# Patient Record
Sex: FEMALE | Race: WHITE | NOT HISPANIC OR LATINO | Employment: OTHER | URBAN - METROPOLITAN AREA
[De-identification: names, ages, dates, MRNs, and addresses within clinical notes are randomized per-mention and may not be internally consistent; named-entity substitution may affect disease eponyms.]

---

## 2017-04-17 ENCOUNTER — TRANSCRIBE ORDERS (OUTPATIENT)
Dept: LAB | Facility: CLINIC | Age: 72
End: 2017-04-17

## 2017-04-17 DIAGNOSIS — E04.1 NONTOXIC UNINODULAR GOITER: ICD-10-CM

## 2017-04-17 DIAGNOSIS — I11.9 MALIGNANT HYPERTENSIVE HEART DISEASE WITHOUT HEART FAILURE: Primary | ICD-10-CM

## 2017-04-17 DIAGNOSIS — R05.9 COUGH: ICD-10-CM

## 2017-04-17 DIAGNOSIS — E11.8 TYPE 2 DIABETES MELLITUS WITH COMPLICATION, UNSPECIFIED LONG TERM INSULIN USE STATUS: ICD-10-CM

## 2022-03-02 ENCOUNTER — HOSPITAL ENCOUNTER (INPATIENT)
Facility: HOSPITAL | Age: 77
LOS: 7 days | Discharge: HOME | DRG: 391 | End: 2022-03-09
Attending: EMERGENCY MEDICINE | Admitting: INTERNAL MEDICINE
Payer: MEDICARE

## 2022-03-02 ENCOUNTER — APPOINTMENT (EMERGENCY)
Dept: CT IMAGING | Facility: HOSPITAL | Age: 77
DRG: 391 | End: 2022-03-02
Payer: MEDICARE

## 2022-03-02 ENCOUNTER — APPOINTMENT (EMERGENCY)
Dept: RADIOLOGY | Facility: HOSPITAL | Age: 77
DRG: 391 | End: 2022-03-02
Payer: MEDICARE

## 2022-03-02 DIAGNOSIS — R11.2 NAUSEA AND VOMITING, INTRACTABILITY OF VOMITING NOT SPECIFIED, UNSPECIFIED VOMITING TYPE: ICD-10-CM

## 2022-03-02 DIAGNOSIS — N18.9 ACUTE KIDNEY INJURY SUPERIMPOSED ON CHRONIC KIDNEY DISEASE (HCC): ICD-10-CM

## 2022-03-02 DIAGNOSIS — N17.9 ACUTE KIDNEY INJURY SUPERIMPOSED ON CHRONIC KIDNEY DISEASE (HCC): ICD-10-CM

## 2022-03-02 DIAGNOSIS — R11.10 VOMITING: ICD-10-CM

## 2022-03-02 DIAGNOSIS — N18.9 CHRONIC RENAL IMPAIRMENT: ICD-10-CM

## 2022-03-02 DIAGNOSIS — R10.9 ABDOMINAL PAIN: ICD-10-CM

## 2022-03-02 DIAGNOSIS — R65.10 SIRS (SYSTEMIC INFLAMMATORY RESPONSE SYNDROME) (HCC): ICD-10-CM

## 2022-03-02 DIAGNOSIS — E11.10 DKA (DIABETIC KETOACIDOSIS) (HCC): ICD-10-CM

## 2022-03-02 DIAGNOSIS — I16.1 HYPERTENSIVE EMERGENCY: Primary | ICD-10-CM

## 2022-03-02 DIAGNOSIS — D72.829 LEUKOCYTOSIS: ICD-10-CM

## 2022-03-02 DIAGNOSIS — R73.9 HYPERGLYCEMIA: ICD-10-CM

## 2022-03-02 DIAGNOSIS — E87.2 LACTIC ACIDOSIS: ICD-10-CM

## 2022-03-02 PROBLEM — Z51.81 ADMISSION FOR LONG-TERM (CURRENT) USE OF ANTICOAGULANTS: Status: ACTIVE | Noted: 2022-03-02

## 2022-03-02 PROBLEM — I16.0 HYPERTENSIVE URGENCY: Status: ACTIVE | Noted: 2022-03-02

## 2022-03-02 PROBLEM — Z79.01 ADMISSION FOR LONG-TERM (CURRENT) USE OF ANTICOAGULANTS: Status: ACTIVE | Noted: 2022-03-02

## 2022-03-02 PROBLEM — R79.89 ELEVATED BRAIN NATRIURETIC PEPTIDE (BNP) LEVEL: Status: ACTIVE | Noted: 2022-03-02

## 2022-03-02 PROBLEM — I48.0 PAF (PAROXYSMAL ATRIAL FIBRILLATION) (HCC): Status: ACTIVE | Noted: 2022-03-02

## 2022-03-02 LAB
2HR DELTA HS TROPONIN: 4 NG/L
4HR DELTA HS TROPONIN: 3 NG/L
ALBUMIN SERPL BCP-MCNC: 4.1 G/DL (ref 3.5–5)
ALP SERPL-CCNC: 72 U/L (ref 46–116)
ALT SERPL W P-5'-P-CCNC: 35 U/L (ref 12–78)
AMMONIA PLAS-SCNC: <10 UMOL/L (ref 11–35)
ANION GAP SERPL CALCULATED.3IONS-SCNC: 13 MMOL/L (ref 4–13)
ANION GAP SERPL CALCULATED.3IONS-SCNC: 14 MMOL/L (ref 4–13)
ANION GAP SERPL CALCULATED.3IONS-SCNC: 8 MMOL/L (ref 4–13)
APTT PPP: 24 SECONDS (ref 23–37)
AST SERPL W P-5'-P-CCNC: 26 U/L (ref 5–45)
ATRIAL RATE: 105 BPM
BACTERIA UR QL AUTO: NORMAL /HPF
BASE EX.OXY STD BLDV CALC-SCNC: 64.5 % (ref 60–80)
BASE EXCESS BLDV CALC-SCNC: 1.2 MMOL/L
BASOPHILS # BLD AUTO: 0.06 THOUSANDS/ΜL (ref 0–0.1)
BASOPHILS NFR BLD AUTO: 0 % (ref 0–1)
BETA-HYDROXYBUTYRATE: 0.8 MMOL/L
BILIRUB SERPL-MCNC: 2.01 MG/DL (ref 0.2–1)
BILIRUB UR QL STRIP: NEGATIVE
BUN SERPL-MCNC: 18 MG/DL (ref 5–25)
BUN SERPL-MCNC: 20 MG/DL (ref 5–25)
BUN SERPL-MCNC: 21 MG/DL (ref 5–25)
CALCIUM SERPL-MCNC: 10 MG/DL (ref 8.3–10.1)
CALCIUM SERPL-MCNC: 9.4 MG/DL (ref 8.3–10.1)
CALCIUM SERPL-MCNC: 9.5 MG/DL (ref 8.3–10.1)
CARDIAC TROPONIN I PNL SERPL HS: 13 NG/L
CARDIAC TROPONIN I PNL SERPL HS: 16 NG/L
CARDIAC TROPONIN I PNL SERPL HS: 17 NG/L
CHLORIDE SERPL-SCNC: 100 MMOL/L (ref 100–108)
CHLORIDE SERPL-SCNC: 103 MMOL/L (ref 100–108)
CHLORIDE SERPL-SCNC: 96 MMOL/L (ref 100–108)
CLARITY UR: CLEAR
CO2 SERPL-SCNC: 25 MMOL/L (ref 21–32)
CO2 SERPL-SCNC: 28 MMOL/L (ref 21–32)
CO2 SERPL-SCNC: 30 MMOL/L (ref 21–32)
COLOR UR: YELLOW
CREAT SERPL-MCNC: 1.26 MG/DL (ref 0.6–1.3)
CREAT SERPL-MCNC: 1.46 MG/DL (ref 0.6–1.3)
CREAT SERPL-MCNC: 1.5 MG/DL (ref 0.6–1.3)
EOSINOPHIL # BLD AUTO: 0 THOUSAND/ΜL (ref 0–0.61)
EOSINOPHIL NFR BLD AUTO: 0 % (ref 0–6)
ERYTHROCYTE [DISTWIDTH] IN BLOOD BY AUTOMATED COUNT: 13.8 % (ref 11.6–15.1)
FLUAV RNA RESP QL NAA+PROBE: NEGATIVE
FLUBV RNA RESP QL NAA+PROBE: NEGATIVE
GFR SERPL CREATININE-BSD FRML MDRD: 34 ML/MIN/1.73SQ M
GFR SERPL CREATININE-BSD FRML MDRD: 35 ML/MIN/1.73SQ M
GFR SERPL CREATININE-BSD FRML MDRD: 42 ML/MIN/1.73SQ M
GLUCOSE SERPL-MCNC: 175 MG/DL (ref 65–140)
GLUCOSE SERPL-MCNC: 203 MG/DL (ref 65–140)
GLUCOSE SERPL-MCNC: 203 MG/DL (ref 65–140)
GLUCOSE SERPL-MCNC: 220 MG/DL (ref 65–140)
GLUCOSE SERPL-MCNC: 229 MG/DL (ref 65–140)
GLUCOSE SERPL-MCNC: 252 MG/DL (ref 65–140)
GLUCOSE SERPL-MCNC: 364 MG/DL (ref 65–140)
GLUCOSE SERPL-MCNC: 396 MG/DL (ref 65–140)
GLUCOSE SERPL-MCNC: 401 MG/DL (ref 65–140)
GLUCOSE SERPL-MCNC: 417 MG/DL (ref 65–140)
GLUCOSE SERPL-MCNC: 440 MG/DL (ref 65–140)
GLUCOSE UR STRIP-MCNC: ABNORMAL MG/DL
HCO3 BLDV-SCNC: 27.9 MMOL/L (ref 24–30)
HCT VFR BLD AUTO: 43.2 % (ref 34.8–46.1)
HGB BLD-MCNC: 15.1 G/DL (ref 11.5–15.4)
HGB UR QL STRIP.AUTO: ABNORMAL
IMM GRANULOCYTES # BLD AUTO: 0.13 THOUSAND/UL (ref 0–0.2)
IMM GRANULOCYTES NFR BLD AUTO: 1 % (ref 0–2)
INR PPP: 1.02 (ref 0.84–1.19)
KETONES UR STRIP-MCNC: ABNORMAL MG/DL
LACTATE SERPL-SCNC: 2 MMOL/L (ref 0.5–2)
LACTATE SERPL-SCNC: 2.1 MMOL/L (ref 0.5–2)
LACTATE SERPL-SCNC: 2.3 MMOL/L (ref 0.5–2)
LACTATE SERPL-SCNC: 3.1 MMOL/L (ref 0.5–2)
LEUKOCYTE ESTERASE UR QL STRIP: ABNORMAL
LIPASE SERPL-CCNC: 185 U/L (ref 73–393)
LYMPHOCYTES # BLD AUTO: 1.44 THOUSANDS/ΜL (ref 0.6–4.47)
LYMPHOCYTES NFR BLD AUTO: 10 % (ref 14–44)
MCH RBC QN AUTO: 27.7 PG (ref 26.8–34.3)
MCHC RBC AUTO-ENTMCNC: 35 G/DL (ref 31.4–37.4)
MCV RBC AUTO: 79 FL (ref 82–98)
MONOCYTES # BLD AUTO: 0.3 THOUSAND/ΜL (ref 0.17–1.22)
MONOCYTES NFR BLD AUTO: 2 % (ref 4–12)
NEUTROPHILS # BLD AUTO: 12.91 THOUSANDS/ΜL (ref 1.85–7.62)
NEUTS SEG NFR BLD AUTO: 87 % (ref 43–75)
NITRITE UR QL STRIP: NEGATIVE
NON-SQ EPI CELLS URNS QL MICRO: NORMAL /HPF
NRBC BLD AUTO-RTO: 0 /100 WBCS
NT-PROBNP SERPL-MCNC: 4572 PG/ML
O2 CT BLDV-SCNC: 13.3 ML/DL
P AXIS: 69 DEGREES
PCO2 BLDV: 52 MM HG (ref 42–50)
PH BLDV: 7.35 [PH] (ref 7.3–7.4)
PH UR STRIP.AUTO: 7.5 [PH]
PLATELET # BLD AUTO: 293 THOUSANDS/UL (ref 149–390)
PMV BLD AUTO: 10.8 FL (ref 8.9–12.7)
PO2 BLDV: 36.4 MM HG (ref 35–45)
POTASSIUM SERPL-SCNC: 3.4 MMOL/L (ref 3.5–5.3)
POTASSIUM SERPL-SCNC: 3.7 MMOL/L (ref 3.5–5.3)
POTASSIUM SERPL-SCNC: 4 MMOL/L (ref 3.5–5.3)
PR INTERVAL: 172 MS
PROT SERPL-MCNC: 8.4 G/DL (ref 6.4–8.2)
PROT UR STRIP-MCNC: >=300 MG/DL
PROTHROMBIN TIME: 13.4 SECONDS (ref 11.6–14.5)
QRS AXIS: -39 DEGREES
QRSD INTERVAL: 90 MS
QT INTERVAL: 336 MS
QTC INTERVAL: 444 MS
RBC # BLD AUTO: 5.46 MILLION/UL (ref 3.81–5.12)
RBC #/AREA URNS AUTO: NORMAL /HPF
RSV RNA RESP QL NAA+PROBE: NEGATIVE
SARS-COV-2 RNA RESP QL NAA+PROBE: NEGATIVE
SODIUM SERPL-SCNC: 135 MMOL/L (ref 136–145)
SODIUM SERPL-SCNC: 141 MMOL/L (ref 136–145)
SODIUM SERPL-SCNC: 141 MMOL/L (ref 136–145)
SP GR UR STRIP.AUTO: 1.02 (ref 1–1.03)
T WAVE AXIS: 36 DEGREES
TSH SERPL DL<=0.05 MIU/L-ACNC: 1.5 UIU/ML (ref 0.36–3.74)
UROBILINOGEN UR QL STRIP.AUTO: 0.2 E.U./DL
VENTRICULAR RATE: 105 BPM
WBC # BLD AUTO: 14.84 THOUSAND/UL (ref 4.31–10.16)
WBC #/AREA URNS AUTO: NORMAL /HPF

## 2022-03-02 PROCEDURE — 83880 ASSAY OF NATRIURETIC PEPTIDE: CPT | Performed by: PHYSICIAN ASSISTANT

## 2022-03-02 PROCEDURE — 85025 COMPLETE CBC W/AUTO DIFF WBC: CPT | Performed by: PHYSICIAN ASSISTANT

## 2022-03-02 PROCEDURE — 84484 ASSAY OF TROPONIN QUANT: CPT | Performed by: PHYSICIAN ASSISTANT

## 2022-03-02 PROCEDURE — 84443 ASSAY THYROID STIM HORMONE: CPT | Performed by: PHYSICIAN ASSISTANT

## 2022-03-02 PROCEDURE — 93005 ELECTROCARDIOGRAM TRACING: CPT

## 2022-03-02 PROCEDURE — 0241U HB NFCT DS VIR RESP RNA 4 TRGT: CPT | Performed by: PHYSICIAN ASSISTANT

## 2022-03-02 PROCEDURE — 99285 EMERGENCY DEPT VISIT HI MDM: CPT

## 2022-03-02 PROCEDURE — 96376 TX/PRO/DX INJ SAME DRUG ADON: CPT

## 2022-03-02 PROCEDURE — 80048 BASIC METABOLIC PNL TOTAL CA: CPT | Performed by: INTERNAL MEDICINE

## 2022-03-02 PROCEDURE — 82010 KETONE BODYS QUAN: CPT | Performed by: PHYSICIAN ASSISTANT

## 2022-03-02 PROCEDURE — 82948 REAGENT STRIP/BLOOD GLUCOSE: CPT

## 2022-03-02 PROCEDURE — 82805 BLOOD GASES W/O2 SATURATION: CPT | Performed by: PHYSICIAN ASSISTANT

## 2022-03-02 PROCEDURE — 71275 CT ANGIOGRAPHY CHEST: CPT

## 2022-03-02 PROCEDURE — 85730 THROMBOPLASTIN TIME PARTIAL: CPT | Performed by: PHYSICIAN ASSISTANT

## 2022-03-02 PROCEDURE — 74174 CTA ABD&PLVS W/CONTRAST: CPT

## 2022-03-02 PROCEDURE — 96374 THER/PROPH/DIAG INJ IV PUSH: CPT

## 2022-03-02 PROCEDURE — 70450 CT HEAD/BRAIN W/O DYE: CPT

## 2022-03-02 PROCEDURE — G1004 CDSM NDSC: HCPCS

## 2022-03-02 PROCEDURE — 87040 BLOOD CULTURE FOR BACTERIA: CPT | Performed by: PHYSICIAN ASSISTANT

## 2022-03-02 PROCEDURE — C9113 INJ PANTOPRAZOLE SODIUM, VIA: HCPCS | Performed by: PHYSICIAN ASSISTANT

## 2022-03-02 PROCEDURE — 83605 ASSAY OF LACTIC ACID: CPT | Performed by: PHYSICIAN ASSISTANT

## 2022-03-02 PROCEDURE — 71045 X-RAY EXAM CHEST 1 VIEW: CPT

## 2022-03-02 PROCEDURE — 83690 ASSAY OF LIPASE: CPT | Performed by: PHYSICIAN ASSISTANT

## 2022-03-02 PROCEDURE — 81001 URINALYSIS AUTO W/SCOPE: CPT | Performed by: PHYSICIAN ASSISTANT

## 2022-03-02 PROCEDURE — 99223 1ST HOSP IP/OBS HIGH 75: CPT | Performed by: INTERNAL MEDICINE

## 2022-03-02 PROCEDURE — 83036 HEMOGLOBIN GLYCOSYLATED A1C: CPT | Performed by: PHYSICIAN ASSISTANT

## 2022-03-02 PROCEDURE — 36415 COLL VENOUS BLD VENIPUNCTURE: CPT | Performed by: PHYSICIAN ASSISTANT

## 2022-03-02 PROCEDURE — 99285 EMERGENCY DEPT VISIT HI MDM: CPT | Performed by: PHYSICIAN ASSISTANT

## 2022-03-02 PROCEDURE — 80053 COMPREHEN METABOLIC PANEL: CPT | Performed by: PHYSICIAN ASSISTANT

## 2022-03-02 PROCEDURE — 87086 URINE CULTURE/COLONY COUNT: CPT | Performed by: PHYSICIAN ASSISTANT

## 2022-03-02 PROCEDURE — 96361 HYDRATE IV INFUSION ADD-ON: CPT

## 2022-03-02 PROCEDURE — 99232 SBSQ HOSP IP/OBS MODERATE 35: CPT | Performed by: INTERNAL MEDICINE

## 2022-03-02 PROCEDURE — 85610 PROTHROMBIN TIME: CPT | Performed by: PHYSICIAN ASSISTANT

## 2022-03-02 PROCEDURE — 96375 TX/PRO/DX INJ NEW DRUG ADDON: CPT

## 2022-03-02 PROCEDURE — 93010 ELECTROCARDIOGRAM REPORT: CPT | Performed by: INTERNAL MEDICINE

## 2022-03-02 PROCEDURE — 82140 ASSAY OF AMMONIA: CPT | Performed by: PHYSICIAN ASSISTANT

## 2022-03-02 RX ORDER — CARVEDILOL 12.5 MG/1
25 TABLET ORAL 2 TIMES DAILY WITH MEALS
Status: DISCONTINUED | OUTPATIENT
Start: 2022-03-02 | End: 2022-03-03

## 2022-03-02 RX ORDER — SIMVASTATIN 10 MG
10 TABLET ORAL
COMMUNITY

## 2022-03-02 RX ORDER — SODIUM CHLORIDE 9 MG/ML
75 INJECTION, SOLUTION INTRAVENOUS CONTINUOUS
Status: DISCONTINUED | OUTPATIENT
Start: 2022-03-02 | End: 2022-03-03

## 2022-03-02 RX ORDER — PANTOPRAZOLE SODIUM 40 MG/1
40 INJECTION, POWDER, FOR SOLUTION INTRAVENOUS EVERY 12 HOURS SCHEDULED
Status: DISCONTINUED | OUTPATIENT
Start: 2022-03-02 | End: 2022-03-06

## 2022-03-02 RX ORDER — FAMOTIDINE 20 MG/1
20 TABLET, FILM COATED ORAL 2 TIMES DAILY
Status: DISCONTINUED | OUTPATIENT
Start: 2022-03-02 | End: 2022-03-03

## 2022-03-02 RX ORDER — LABETALOL 20 MG/4 ML (5 MG/ML) INTRAVENOUS SYRINGE
10 ONCE
Status: COMPLETED | OUTPATIENT
Start: 2022-03-02 | End: 2022-03-02

## 2022-03-02 RX ORDER — LABETALOL 20 MG/4 ML (5 MG/ML) INTRAVENOUS SYRINGE
10 EVERY 6 HOURS PRN
Status: DISCONTINUED | OUTPATIENT
Start: 2022-03-02 | End: 2022-03-03

## 2022-03-02 RX ORDER — PANTOPRAZOLE SODIUM 40 MG/1
40 INJECTION, POWDER, FOR SOLUTION INTRAVENOUS EVERY 12 HOURS SCHEDULED
Status: DISCONTINUED | OUTPATIENT
Start: 2022-03-02 | End: 2022-03-02

## 2022-03-02 RX ORDER — CARVEDILOL 25 MG/1
25 TABLET ORAL 2 TIMES DAILY WITH MEALS
COMMUNITY

## 2022-03-02 RX ORDER — LABETALOL 20 MG/4 ML (5 MG/ML) INTRAVENOUS SYRINGE
20 ONCE
Status: COMPLETED | OUTPATIENT
Start: 2022-03-02 | End: 2022-03-02

## 2022-03-02 RX ORDER — ONDANSETRON 2 MG/ML
4 INJECTION INTRAMUSCULAR; INTRAVENOUS ONCE
Status: COMPLETED | OUTPATIENT
Start: 2022-03-02 | End: 2022-03-02

## 2022-03-02 RX ORDER — METOCLOPRAMIDE HYDROCHLORIDE 5 MG/ML
10 INJECTION INTRAMUSCULAR; INTRAVENOUS ONCE
Status: COMPLETED | OUTPATIENT
Start: 2022-03-02 | End: 2022-03-02

## 2022-03-02 RX ORDER — METOCLOPRAMIDE HYDROCHLORIDE 5 MG/ML
10 INJECTION INTRAMUSCULAR; INTRAVENOUS EVERY 6 HOURS PRN
Status: DISCONTINUED | OUTPATIENT
Start: 2022-03-02 | End: 2022-03-06

## 2022-03-02 RX ORDER — ONDANSETRON 2 MG/ML
4 INJECTION INTRAMUSCULAR; INTRAVENOUS EVERY 4 HOURS PRN
Status: DISCONTINUED | OUTPATIENT
Start: 2022-03-02 | End: 2022-03-06

## 2022-03-02 RX ORDER — LABETALOL 20 MG/4 ML (5 MG/ML) INTRAVENOUS SYRINGE
20 ONCE
Status: DISCONTINUED | OUTPATIENT
Start: 2022-03-02 | End: 2022-03-03

## 2022-03-02 RX ORDER — GLIMEPIRIDE 4 MG/1
4 TABLET ORAL
COMMUNITY
End: 2022-03-09 | Stop reason: HOSPADM

## 2022-03-02 RX ORDER — MECLIZINE HYDROCHLORIDE 25 MG/1
25 TABLET ORAL EVERY 12 HOURS PRN
COMMUNITY
End: 2022-03-09 | Stop reason: HOSPADM

## 2022-03-02 RX ORDER — HYDROMORPHONE HCL/PF 1 MG/ML
0.2 SYRINGE (ML) INJECTION EVERY 4 HOURS PRN
Status: DISCONTINUED | OUTPATIENT
Start: 2022-03-02 | End: 2022-03-03

## 2022-03-02 RX ORDER — MAGNESIUM HYDROXIDE/ALUMINUM HYDROXICE/SIMETHICONE 120; 1200; 1200 MG/30ML; MG/30ML; MG/30ML
30 SUSPENSION ORAL EVERY 4 HOURS PRN
Status: DISCONTINUED | OUTPATIENT
Start: 2022-03-02 | End: 2022-03-06

## 2022-03-02 RX ORDER — MORPHINE SULFATE 4 MG/ML
4 INJECTION, SOLUTION INTRAMUSCULAR; INTRAVENOUS ONCE
Status: COMPLETED | OUTPATIENT
Start: 2022-03-02 | End: 2022-03-02

## 2022-03-02 RX ORDER — PRAVASTATIN SODIUM 20 MG
20 TABLET ORAL
Status: DISCONTINUED | OUTPATIENT
Start: 2022-03-02 | End: 2022-03-09 | Stop reason: HOSPADM

## 2022-03-02 RX ORDER — CETIRIZINE HYDROCHLORIDE 10 MG/1
10 TABLET ORAL DAILY
COMMUNITY
End: 2022-03-09 | Stop reason: HOSPADM

## 2022-03-02 RX ORDER — PANTOPRAZOLE SODIUM 40 MG/1
40 INJECTION, POWDER, FOR SOLUTION INTRAVENOUS ONCE
Status: COMPLETED | OUTPATIENT
Start: 2022-03-02 | End: 2022-03-02

## 2022-03-02 RX ADMIN — IOHEXOL 100 ML: 350 INJECTION, SOLUTION INTRAVENOUS at 06:20

## 2022-03-02 RX ADMIN — LABETALOL HYDROCHLORIDE 10 MG: 5 INJECTION, SOLUTION INTRAVENOUS at 05:53

## 2022-03-02 RX ADMIN — SODIUM CHLORIDE 9 UNITS/HR: 9 INJECTION, SOLUTION INTRAVENOUS at 12:44

## 2022-03-02 RX ADMIN — SODIUM CHLORIDE 1000 ML: 0.9 INJECTION, SOLUTION INTRAVENOUS at 10:01

## 2022-03-02 RX ADMIN — LABETALOL HYDROCHLORIDE 20 MG: 5 INJECTION, SOLUTION INTRAVENOUS at 10:24

## 2022-03-02 RX ADMIN — LABETALOL HYDROCHLORIDE 10 MG: 5 INJECTION, SOLUTION INTRAVENOUS at 09:15

## 2022-03-02 RX ADMIN — LABETALOL HYDROCHLORIDE 10 MG: 5 INJECTION, SOLUTION INTRAVENOUS at 15:44

## 2022-03-02 RX ADMIN — PRAVASTATIN SODIUM 20 MG: 20 TABLET ORAL at 16:24

## 2022-03-02 RX ADMIN — MORPHINE SULFATE 4 MG: 4 INJECTION INTRAVENOUS at 05:53

## 2022-03-02 RX ADMIN — CARVEDILOL 25 MG: 12.5 TABLET, FILM COATED ORAL at 17:03

## 2022-03-02 RX ADMIN — LABETALOL HYDROCHLORIDE 10 MG: 5 INJECTION, SOLUTION INTRAVENOUS at 07:15

## 2022-03-02 RX ADMIN — MORPHINE SULFATE 2 MG: 2 INJECTION, SOLUTION INTRAMUSCULAR; INTRAVENOUS at 09:20

## 2022-03-02 RX ADMIN — FAMOTIDINE 20 MG: 20 TABLET, FILM COATED ORAL at 14:16

## 2022-03-02 RX ADMIN — METOCLOPRAMIDE HYDROCHLORIDE 10 MG: 5 INJECTION INTRAMUSCULAR; INTRAVENOUS at 07:46

## 2022-03-02 RX ADMIN — PANTOPRAZOLE SODIUM 40 MG: 40 INJECTION, POWDER, FOR SOLUTION INTRAVENOUS at 20:12

## 2022-03-02 RX ADMIN — PANTOPRAZOLE SODIUM 40 MG: 40 INJECTION, POWDER, FOR SOLUTION INTRAVENOUS at 10:01

## 2022-03-02 RX ADMIN — ONDANSETRON 4 MG: 2 INJECTION INTRAMUSCULAR; INTRAVENOUS at 05:53

## 2022-03-02 RX ADMIN — SODIUM CHLORIDE 75 ML/HR: 0.9 INJECTION, SOLUTION INTRAVENOUS at 15:26

## 2022-03-02 RX ADMIN — HYDROMORPHONE HYDROCHLORIDE 0.2 MG: 1 INJECTION, SOLUTION INTRAMUSCULAR; INTRAVENOUS; SUBCUTANEOUS at 16:22

## 2022-03-02 NOTE — ASSESSMENT & PLAN NOTE
· No previous levels on file to compare, check echocardiogram non-urgently  · No current evidence of heart failure

## 2022-03-02 NOTE — ASSESSMENT & PLAN NOTE
· On xarelto but no clear knowledge of why; no documented history of afib or VTE  · Continue xarelto for now as long as she is not bleeding, and will attempt to get additional info

## 2022-03-02 NOTE — ED CARE HANDOFF
Emergency Department Sign Out Note        Sign out and transfer of care from [facility]  See Separate Emergency Department note  The patient, Sam Varela, was evaluated by the previous provider for epigastric pain, nausea, vomiting and HTN  Patient recently moved to the [address] [place] [address],[address] from [place] 2-3 months ago  Patient's PCP provided me with information in regards to her medical history which includes HTN, DM, Bipolar Disorder/Schizophrenia  PCP reports possible history of paroxysmal atrial fibrillation for which she takes Xerelto  PCP reports issues with compliance with medications  Workup Completed:  EKG, Trop x 2, CBC, CMP, Lipase, UA, Blood Gas, Beta Hydroxybutyrate, Ammonia, Lactic Acid, CXR, CTA Dissection Study    ED Course / Workup Pending (followup):  Repeat EKK and Troponin  Repeat BP Medications  Re-evaluation                                  ED Course as of 03/02/22 1222   Wed Mar 02, 2022   1134 Case discussed with CCU team who reccommends IV Labetalol 20 mg every 10-15 minutes to achieve systolic 930     4265 Case discussed with SLIM who is agreeable to admission to Step Down 2        ECG 12 Lead Documentation Only    Date/Time: 3/2/2022 9:43 AM  Performed by: Candie Palacio PA-C  Authorized by: Pedro Hernandez MD     Indications / Diagnosis:  Epigastric Pain, Repeat EKG  Patient location:  ED  Rate:     ECG rate:  95    ECG rate assessment: tachycardic    Rhythm:     Rhythm: sinus tachycardia    QRS:     QRS axis:  Left  ST segments:     ST segments:  Non-specific    Elevation:  V2 and III  T waves:     T waves: inverted      Inverted:  V5 and V6  Comments:      QT/QTc 360/452      MDM        Disposition  Final diagnoses:   Hypertensive emergency   Abdominal pain   Vomiting   Lactic acidosis   Leukocytosis   Hyperglycemia     Time reflects when diagnosis was documented in both MDM as applicable and the Disposition within this note     Time User Action Codes Description Comment 3/2/2022  8:17 AM Katalnia Cordon Add [I16 1] Hypertensive emergency     3/2/2022  8:17 AM Katalina Cordon Add [R10 9] Abdominal pain     3/2/2022  8:17 AM Katalina Cordon Add [R11 10] Vomiting     3/2/2022  8:17 AM Katalina Cordon Add [E87 2] Lactic acidosis     3/2/2022  8:17 AM Katalina Cordon Add [D72 829] Leukocytosis     3/2/2022  8:31 AM Katalina Cordon Add [R73 9] Hyperglycemia       ED Disposition     ED Disposition Condition Date/Time Comment    Admit Stable Wed Mar 2, 2022 12:12 PM Case was discussed with Dr Rupa Toledo and the patient's admission status was agreed to be Admission Status: inpatient status to the service of Dr Rupa Toledo  Follow-up Information    None       Patient's Medications   Discharge Prescriptions    No medications on file     No discharge procedures on file         ED Provider  Electronically Signed by     Rosendo Villanueva PA-C  03/02/22 2958

## 2022-03-02 NOTE — INCIDENTAL FINDINGS
The following findings require follow up:  Radiographic finding   Finding: lung nodule   Follow up required: repeat CT chest   Follow up should be done within 3-6 months    Please notify the following clinician to assist with the follow up:  Family doctor

## 2022-03-02 NOTE — ASSESSMENT & PLAN NOTE
· Profoundly elevated blood pressure on admission at 270/133, with associated nonspecific EKG changes  · Responded well to doses of labetalol provided in the ER with some improvement in blood pressure; avoid overcorrection  · Continue Coreg  · Would add ACE/ARB in 24 hours if creatinine stable given underlying DM2

## 2022-03-02 NOTE — H&P
[facility]  H&P- Yany Gaviria 1945, 78 y o  female MRN: [id number]  Unit/Bed#: ED 12 Encounter: [id number]  Primary Care Provider: Munir Jarrell MD   Date and time admitted to hospital: 3/2/2022  5:21 AM    * Abdominal pain with nausea and vomiting  Assessment & Plan  · Patient brought in for abdominal pain with nausea and vomiting, [place] speaking only and provides minimal history but noted to be frequently belching and moaning  · CT scan abdomen and pelvis on admission was unremarkable aside from mild fatty liver  · Recommend supportive care with IV fluids, antiemetics and antacids  · Clear liquids for now and advance as tolerated  · Treat DKA as below    DKA (diabetic ketoacidosis) ([facility] 75 )  Assessment & Plan  No results found for: HGBA1C    Recent Labs     03/02/22  0705 03/02/22  1131 03/02/22  1223   POCGLU 396* 440* 401*       Blood Sugar Average: Last 72 hrs:  (P) 291 [id number]   · Patient presented with nausea, vomiting, epigastric pain and was found to have severe hyperglycemia   Mildly elevated anion gap and mildly elevated beta hydroxybutyrate  · Stop oral hypoglycemic agents including Amaryl, metformin and Januvia  · Continue IV fluids and DKA protocol insulin drip  · Await A1c  · Consult endocrinology    Hypertensive urgency  Assessment & Plan  · Profoundly elevated blood pressure on admission at 270/133, with associated nonspecific EKG changes  · Responded well to doses of labetalol provided in the ER with some improvement in blood pressure; avoid overcorrection  · Continue Coreg  · Would add ACE/ARB in 24 hours if creatinine stable given underlying DM2    Lactic acidosis  Assessment & Plan  · Stop metformin  · Continue IVF  · Trend lactic acid    use of anticoagulation  Assessment & Plan  · On xarelto but no clear knowledge of why; no documented history of afib or VTE  · Continue xarelto for now as long as she is not bleeding, and will attempt to get additional info    Elevated brain natriuretic peptide (BNP) level  Assessment & Plan  · No previous levels on file to compare, check echocardiogram non-urgently  · No current evidence of heart failure      SIRS (systemic inflammatory response syndrome) (HCC)  Assessment & Plan  · Patient with leukocytosis, tachycardia and lactic acidosis present on admission -possibly viral gastroenteritis  Monitor clinically and trend vitals    VTE Prophylaxis: Rivaroxaban (Xarelto)   Code Status:  Unable to successfully discussed--will maintain full code status  POLST: POLST is not applicable to this patient  Discussion with family:  I attempted to reach patient's son, unsuccessfully  Left a voicemail  Also noted that the ER provider has been attempting to reach out to him several times today    Anticipated Length of Stay:  Patient will be admitted on an Inpatient basis with an anticipated length of stay of  greater than 2 midnights  Justification for Hospital Stay:  DKA and severe hypertension    Total Time for Visit, including Counseling / Coordination of Care: 60 minutes  Greater than 50% of this total time spent on direct patient counseling and coordination of care  Chief Complaint:   Nausea, vomiting and abdominal pain    History of Present Illness:    Jennifer Arambula is a 78 y o  female who presents with epigastric pain with nausea and vomiting  Unfortunately patient only speaks [place] and no family members are available to provide history and patient herself at this time is a limited historian also due to being ill and fatigued  She just recently moved from [place] within the last couple months and was seen 1 time by a new PCP, with whom the ER team spoke  There were apparently concerns with patient being compliant with her medications    Patient does answer no when asked if she is having any chest pain or shortness of breath (via Google translate) and is still reporting abdominal pain at this time    I spoke with patient's nurse in the ER who reports that she was catheterized for 500 mL simply to obtain a urine sample not due to complaints of retention  Since arriving in the hospital and receiving several doses of labetalol her blood pressure has now improved and she was just initiated on insulin drip a short time ago with current blood sugar still over 400  Review of Systems:  Significantly limited ability to obtain history in the setting of patient only speaking [place] and no family at bedside  Also currently ill     Review of Systems   Unable to perform ROS: Other   Respiratory: Negative for shortness of breath  Cardiovascular: Negative for chest pain  Gastrointestinal: Positive for abdominal pain  Past Medical and Surgical History:   No history on file, patient recently came from [place] approximately 3 months ago and has only been seen by PCP 1  Meds/Allergies:    Prior to Admission medications    Medication Sig Start Date End Date Taking? Authorizing Provider   carvedilol (COREG) 25 mg tablet Take 25 mg by mouth 2 (two) times a day with meals   Yes Historical Provider, MD   cetirizine (ZyrTEC) 10 mg tablet Take 10 mg by mouth daily   Yes Historical Provider, MD   glimepiride (AMARYL) 4 mg tablet Take 4 mg by mouth every morning before breakfast   Yes Historical Provider, MD   meclizine (ANTIVERT) 25 mg tablet Take 25 mg by mouth every 12 (twelve) hours as needed for dizziness   Yes Historical Provider, MD   metFORMIN (GLUCOPHAGE) 1000 MG tablet Take 1,000 mg by mouth 2 (two) times a day with meals   Yes Historical Provider, MD   rivaroxaban (XARELTO) 20 mg tablet Take 20 mg by mouth   Yes Historical Provider, MD   simvastatin (ZOCOR) 10 mg tablet Take 10 mg by mouth daily at bedtime   Yes Historical Provider, MD   sitaGLIPtin (JANUVIA) 100 mg tablet Take 100 mg by mouth daily   Yes Historical Provider, MD     Unable to confirm    Allergies:    Allergies   Allergen Reactions    Penicillins Other (See Comments)     Unknown         Social History:     Marital Status: Legally    Occupation:   Patient Pre-hospital Living Situation:   Patient Pre-hospital Level of Mobility:   Patient Pre-hospital Diet Restrictions:   Substance Use History:   Social History     Substance and Sexual Activity   Alcohol Use Not Currently     Social History     Tobacco Use   Smoking Status Never Smoker   Smokeless Tobacco Never Used     Social History     Substance and Sexual Activity   Drug Use Not Currently       Family History:    unknown    Physical Exam:     Vitals:   Blood Pressure: (!) 180/80 (03/02/22 1156)  Pulse: 93 (03/02/22 1124)  Temperature: 98 5 °F (36 9 °C) (03/02/22 1124)  Temp Source: Oral (03/02/22 1124)  Respirations: 18 (03/02/22 1124)  Weight - Scale: 81 6 kg (180 lb) (03/02/22 0534)  SpO2: 99 % (03/02/22 1124)    Physical Exam  Vitals reviewed  Constitutional:       General: She is not in acute distress  Appearance: She is obese  She is not ill-appearing, toxic-appearing or diaphoretic  Eyes:      General: No scleral icterus  Right eye: No discharge  Left eye: No discharge  Cardiovascular:      Rate and Rhythm: Normal rate and regular rhythm  Comments: Possible soft systolic ejection murmur, difficult to assess when patient noted to be moaning  Pulmonary:      Effort: No respiratory distress  Breath sounds: No stridor  No wheezing or rhonchi  Abdominal:      General: There is no distension  Palpations: Abdomen is soft  Tenderness: There is no abdominal tenderness  There is no guarding  Comments: Soft obese abdomen with no focal tenderness to palpation  Noted to be belching and lightly moaning   Musculoskeletal:         General: No swelling, tenderness, deformity or signs of injury  Right lower leg: No edema  Left lower leg: No edema  Skin:     Coloration: Skin is not jaundiced or pale  Findings: No bruising, erythema, lesion or rash  Neurological:      Mental Status: She is alert  Additional Data:     Lab Results: I have personally reviewed pertinent reports  Results from last 7 days   Lab Units 03/02/22  0547   WBC Thousand/uL 14 84*   HEMOGLOBIN g/dL 15 1   HEMATOCRIT % 43 2   PLATELETS Thousands/uL 293   NEUTROS PCT % 87*   LYMPHS PCT % 10*   MONOS PCT % 2*   EOS PCT % 0     Results from last 7 days   Lab Units 03/02/22  0612   SODIUM mmol/L 135*   POTASSIUM mmol/L 3 7   CHLORIDE mmol/L 96*   CO2 mmol/L 25   BUN mg/dL 18   CREATININE mg/dL 1 26   ANION GAP mmol/L 14*   CALCIUM mg/dL 10 0   ALBUMIN g/dL 4 1   TOTAL BILIRUBIN mg/dL 2 01*   ALK PHOS U/L 72   ALT U/L 35   AST U/L 26   GLUCOSE RANDOM mg/dL 417*     Results from last 7 days   Lab Units 03/02/22  0951   INR  1 02     Results from last 7 days   Lab Units 03/02/22  1223 03/02/22  1131 03/02/22  0705   POC GLUCOSE mg/dl 401* 440* 396*         Results from last 7 days   Lab Units 03/02/22  0951 03/02/22  0547   LACTIC ACID mmol/L 2 1* 2 3*       Imaging: I have personally reviewed pertinent reports  CT head without contrast   Final Result by Stephanie Hale MD (03/02 2347)      No acute intracranial abnormality  Workstation performed: [id number]         CTA dissection protocol chest/abdomen/pelvis   Final Result by Jade Jim MD (03/02 1757)      No acute pathology  No aortic aneurysm or dissection  Incidental 2-3 mm right upper lobe nodules  Based on current Fleischner Society 2017 Guidelines on incidental pulmonary nodule, no routine follow-up is needed if the patient is low risk  If the patient is high risk, optional follow-up chest CT at 12    months can be considered  Fatty liver  Colonic diverticulosis without diverticulitis  Workstation performed: [id number]         XR chest 1 view portable   Final Result by Ronald Maldonado MD (03/02 4024)      No acute cardiopulmonary disease                    Workstation performed: [id number]             EKG, Pathology, and Other Studies Reviewed on Admission:   · EKG: NSR    Allscripts / Epic Records Reviewed:no records  Spoke with ER provider who spoke with PCP    ** Please Note: This note has been constructed using a voice recognition system   **

## 2022-03-02 NOTE — ED NOTES
Medication unable to be scanned, pharmacy notified, will be sent back to pharmacy for new label      Conrad Bourne RN  03/02/22 5534

## 2022-03-02 NOTE — CONSULTS
Consultation - Saint Bucy 78 y o  female MRN: [id number]    Unit/Bed#: ED 12 Encounter: [id number]      Assessment/Plan     1  Type 2 diabetes mellitus not on long-term insulin therapy with hyperglycemia  2  Possible mild DKA  3  Abdominal pain, nausea, vomiting  4  Leukocytosis    Poorly controlled based on history  -continue insulin drip, IV fluid per protocol for now  - Monitor BMP every 4 hours  -C2y-bhxrtyc pending  -once acidosis resolves, patient has clinically improved, is taking orally, will transition to subcutaneous basal bolus insulin    5  Hypertension, hypertensive urgency-blood pressure is now within normal limits  Care per primary team  7  History of schizophrenia? CC: Diabetes Consult    History of Present Illness     HPI: Saint Bucy is a 78y o  year old female with past medical history type 2 diabetes mellitus, hypertension, schizophrenia who presented with abdominal pain,nausea, fatigue  Endocrinology has been consulted for management of diabetes mellitus  Patient is [place] speaking, used a phone , Darvin Fonseca [phone], unfortunately patient was able to provide very little history  Called and spoke to patient's son  Diagnosed with diabetes mellitus over 10 years ago, was on oral medications, per chart review on Januvia, metformin, glimepiride  History on for noncompliance per family  Patient reports variable blood sugars, unable to tell me range  Not sure which medication she is taking for schizophrenia/bipolar disorder? Patient was following up with her primary care physician  No known history of CAD/CVA/CKD, retinopathy  Patient complains of abdominal pain    CT Abdo-no acute pathology  No aortic aneurysm or dissection  Incidental 2-3 mm right upper lobe nodules  Fatty liver  Colonic diverticulosis without diverticulitis    On presentation, blood sugar 417 mg/dL, anion gap 14, bicarb 25  Elevated lactic acid levels  Not acidotic on VBG  Mildly elevated beta hydroxybutyrate 0 8  Total bilirubin 2 0 LFTs, amylase within normal limits  Had hypertensive urgency    Patient has been started on insulin drip for glycemic management        Inpatient consult to Endocrinology  Consult performed by: Jennie Serrano MD  Consult ordered by: Lacey Tolbert PA-C          Review of Systems    Historical Information   History reviewed  No pertinent past medical history  History reviewed  No pertinent surgical history  Social History   Social History     Substance and Sexual Activity   Alcohol Use Not Currently     Social History     Substance and Sexual Activity   Drug Use Not Currently     Social History     Tobacco Use   Smoking Status Never Smoker   Smokeless Tobacco Never Used     Family History: History reviewed  No pertinent family history      Meds/Allergies   Current Facility-Administered Medications   Medication Dose Route Frequency Provider Last Rate Last Admin    aluminum-magnesium hydroxide-simethicone (MYLANTA) oral suspension 30 mL  30 mL Oral Q4H PRN Galina Melendrez PA-C        carvedilol (COREG) tablet 25 mg  25 mg Oral BID With Meals Galina Melendrez PA-C        famotidine (PEPCID) tablet 20 mg  20 mg Oral BID Galina Melendrez PA-C   20 mg at 03/02/22 1416    HYDROmorphone (DILAUDID) injection 0 2 mg  0 2 mg Intravenous Q4H PRN Galina Melendrez PA-C   0 2 mg at 03/02/22 1622    insulin regular (HumuLIN R,NovoLIN R) 1 Units/mL in sodium chloride 0 9 % 100 mL infusion  0 3-21 Units/hr Intravenous Titrated Galina Melendrez PA-C 4 mL/hr at 03/02/22 1620 4 Units/hr at 03/02/22 1620    Labetalol HCl (NORMODYNE) injection 10 mg  10 mg Intravenous Q6H PRN Galina Melendrez PA-C   10 mg at 03/02/22 1544    Labetalol HCl (NORMODYNE) injection 20 mg  20 mg Intravenous Once Galina Melendrez PA-C        metoclopramide (REGLAN) injection 10 mg  10 mg Intravenous Q6H PRN Galina Melendrez PA-C        ondansetron (ZOFRAN) injection 4 mg  4 mg Intravenous Q4H PRN Galina AMIRAH Melendrez        pantoprazole (PROTONIX) injection 40 mg  40 mg Intravenous Q12H [address] Galina Melendrez PA-C        pravastatin (PRAVACHOL) tablet 20 mg  20 mg Oral Daily With [place] Radha, PA-NATO   20 mg at 03/02/22 1624    [START ON 3/3/2022] rivaroxaban (XARELTO) tablet 20 mg  20 mg Oral Daily With Breakfast Galina Melendrez PA-C        sodium chloride 0 9 % infusion  75 mL/hr Intravenous Continuous Galina Melendrez PA-C 75 mL/hr at 03/02/22 1526 75 mL/hr at 03/02/22 1526     Current Outpatient Medications   Medication Sig Dispense Refill    carvedilol (COREG) 25 mg tablet Take 25 mg by mouth 2 (two) times a day with meals      cetirizine (ZyrTEC) 10 mg tablet Take 10 mg by mouth daily      glimepiride (AMARYL) 4 mg tablet Take 4 mg by mouth every morning before breakfast      meclizine (ANTIVERT) 25 mg tablet Take 25 mg by mouth every 12 (twelve) hours as needed for dizziness      metFORMIN (GLUCOPHAGE) 1000 MG tablet Take 1,000 mg by mouth 2 (two) times a day with meals      rivaroxaban (XARELTO) 20 mg tablet Take 20 mg by mouth      simvastatin (ZOCOR) 10 mg tablet Take 10 mg by mouth daily at bedtime      sitaGLIPtin (JANUVIA) 100 mg tablet Take 100 mg by mouth daily       Allergies   Allergen Reactions    Penicillins Other (See Comments)     Unknown         Objective   Vitals: Blood pressure 149/65, pulse 92, temperature 98 5 °F (36 9 °C), temperature source Oral, resp  rate 20, weight 81 6 kg (180 lb), SpO2 97 %  No intake or output data in the 24 hours ending 03/02/22 1636  Invasive Devices  Report    Peripheral Intravenous Line            Peripheral IV 03/02/22 Left Antecubital <1 day    Peripheral IV 03/02/22 Right Antecubital <1 day                Physical Exam    Constitutional:Oriented to person, place, and time  Appears well-developed and well-nourished  Appears to be in discomfort, sleepy  HENT:   Head: Normocephalic and atraumatic  Neck: Normal range of motion     Pulmonary/Chest: Effort normal/ breathing comfortably on room air CTAB  Musculoskeletal: Normal range of motion  Neurological: Alert and oriented to person, place, and time  Skin: Not diaphoretic  Psychiatric: Normal mood and affect  Behavior is normal    Abdomen- generalized tenderness +, no distension, guarding  Extremities-mild edema  The history was obtained from the review of the chart, patient and family  Lab Results:       Lab Results   Component Value Date    WBC 14 84 (H) 03/02/2022    HGB 15 1 03/02/2022    HCT 43 2 03/02/2022    MCV 79 (L) 03/02/2022     03/02/2022     Lab Results   Component Value Date/Time    BUN 18 03/02/2022 06:12 AM    K 3 7 03/02/2022 06:12 AM    CL 96 (L) 03/02/2022 06:12 AM    CO2 25 03/02/2022 06:12 AM    CREATININE 1 26 03/02/2022 06:12 AM    AST 26 03/02/2022 06:12 AM    ALT 35 03/02/2022 06:12 AM    ALB 4 1 03/02/2022 06:12 AM     No results for input(s): CHOL, HDL, LDL, TRIG, VLDL in the last 72 hours  No results found for: Fatou Arguelles  POC Glucose (mg/dl)   Date Value   03/02/2022 252 (H)   03/02/2022 364 (H)   03/02/2022 401 (H)   03/02/2022 440 (H)   03/02/2022 396 (H)       Imaging Studies: I have personally reviewed pertinent reports  Portions of the record may have been created with voice recognition software  Occasional wrong word or "sound a like" substitutions may have occurred due to the inherent limitations of voice recognition software  Read the chart carefully and recognize, using context, where substitutions have occurred

## 2022-03-02 NOTE — ASSESSMENT & PLAN NOTE
· Patient with leukocytosis, tachycardia and lactic acidosis present on admission -possibly viral gastroenteritis    Monitor clinically and trend vitals

## 2022-03-02 NOTE — ASSESSMENT & PLAN NOTE
· Patient brought in for abdominal pain with nausea and vomiting, [place] speaking only and provides minimal history but noted to be frequently belching and moaning  · CT scan abdomen and pelvis on admission was unremarkable aside from mild fatty liver  · Recommend supportive care with IV fluids, antiemetics and antacids  · Clear liquids for now and advance as tolerated  · Treat DKA as below

## 2022-03-02 NOTE — ASSESSMENT & PLAN NOTE
No results found for: HGBA1C    Recent Labs     03/02/22  0705 03/02/22  1131 03/02/22  1223   POCGLU 396* 440* 401*       Blood Sugar Average: Last 72 hrs:  (P) 087 [id number]   · Patient presented with nausea, vomiting, epigastric pain and was found to have severe hyperglycemia   Mildly elevated anion gap and mildly elevated beta hydroxybutyrate  · Stop oral hypoglycemic agents including Amaryl, metformin and Januvia  · Continue IV fluids and DKA protocol insulin drip  · Await A1c  · Consult endocrinology

## 2022-03-03 ENCOUNTER — APPOINTMENT (INPATIENT)
Dept: ULTRASOUND IMAGING | Facility: HOSPITAL | Age: 77
DRG: 391 | End: 2022-03-03
Payer: MEDICARE

## 2022-03-03 ENCOUNTER — APPOINTMENT (INPATIENT)
Dept: VASCULAR ULTRASOUND | Facility: HOSPITAL | Age: 77
DRG: 391 | End: 2022-03-03
Payer: MEDICARE

## 2022-03-03 PROBLEM — N17.9 AKI (ACUTE KIDNEY INJURY) (HCC): Status: ACTIVE | Noted: 2022-03-03

## 2022-03-03 LAB
ALBUMIN SERPL BCP-MCNC: 3.6 G/DL (ref 3.5–5)
ALP SERPL-CCNC: 62 U/L (ref 46–116)
ALT SERPL W P-5'-P-CCNC: 29 U/L (ref 12–78)
ANION GAP SERPL CALCULATED.3IONS-SCNC: 11 MMOL/L (ref 4–13)
ANION GAP SERPL CALCULATED.3IONS-SCNC: 8 MMOL/L (ref 4–13)
AST SERPL W P-5'-P-CCNC: 18 U/L (ref 5–45)
BACTERIA UR CULT: NORMAL
BASOPHILS # BLD AUTO: 0.06 THOUSANDS/ΜL (ref 0–0.1)
BASOPHILS NFR BLD AUTO: 0 % (ref 0–1)
BILIRUB SERPL-MCNC: 1.36 MG/DL (ref 0.2–1)
BUN SERPL-MCNC: 23 MG/DL (ref 5–25)
BUN SERPL-MCNC: 25 MG/DL (ref 5–25)
CALCIUM SERPL-MCNC: 9.3 MG/DL (ref 8.3–10.1)
CALCIUM SERPL-MCNC: 9.4 MG/DL (ref 8.3–10.1)
CHLORIDE SERPL-SCNC: 102 MMOL/L (ref 100–108)
CHLORIDE SERPL-SCNC: 103 MMOL/L (ref 100–108)
CO2 SERPL-SCNC: 29 MMOL/L (ref 21–32)
CO2 SERPL-SCNC: 29 MMOL/L (ref 21–32)
CREAT SERPL-MCNC: 1.43 MG/DL (ref 0.6–1.3)
CREAT SERPL-MCNC: 1.43 MG/DL (ref 0.6–1.3)
EOSINOPHIL # BLD AUTO: 0 THOUSAND/ΜL (ref 0–0.61)
EOSINOPHIL NFR BLD AUTO: 0 % (ref 0–6)
ERYTHROCYTE [DISTWIDTH] IN BLOOD BY AUTOMATED COUNT: 14.3 % (ref 11.6–15.1)
EST. AVERAGE GLUCOSE BLD GHB EST-MCNC: 229 MG/DL
GFR SERPL CREATININE-BSD FRML MDRD: 36 ML/MIN/1.73SQ M
GFR SERPL CREATININE-BSD FRML MDRD: 36 ML/MIN/1.73SQ M
GLUCOSE SERPL-MCNC: 138 MG/DL (ref 65–140)
GLUCOSE SERPL-MCNC: 145 MG/DL (ref 65–140)
GLUCOSE SERPL-MCNC: 147 MG/DL (ref 65–140)
GLUCOSE SERPL-MCNC: 151 MG/DL (ref 65–140)
GLUCOSE SERPL-MCNC: 154 MG/DL (ref 65–140)
GLUCOSE SERPL-MCNC: 156 MG/DL (ref 65–140)
GLUCOSE SERPL-MCNC: 164 MG/DL (ref 65–140)
GLUCOSE SERPL-MCNC: 165 MG/DL (ref 65–140)
GLUCOSE SERPL-MCNC: 168 MG/DL (ref 65–140)
GLUCOSE SERPL-MCNC: 169 MG/DL (ref 65–140)
GLUCOSE SERPL-MCNC: 210 MG/DL (ref 65–140)
GLUCOSE SERPL-MCNC: 220 MG/DL (ref 65–140)
GLUCOSE SERPL-MCNC: 81 MG/DL (ref 65–140)
HBA1C MFR BLD: 9.6 %
HCT VFR BLD AUTO: 39.7 % (ref 34.8–46.1)
HGB BLD-MCNC: 13.8 G/DL (ref 11.5–15.4)
IMM GRANULOCYTES # BLD AUTO: 0.07 THOUSAND/UL (ref 0–0.2)
IMM GRANULOCYTES NFR BLD AUTO: 0 % (ref 0–2)
LYMPHOCYTES # BLD AUTO: 2.42 THOUSANDS/ΜL (ref 0.6–4.47)
LYMPHOCYTES NFR BLD AUTO: 14 % (ref 14–44)
MCH RBC QN AUTO: 27.9 PG (ref 26.8–34.3)
MCHC RBC AUTO-ENTMCNC: 34.8 G/DL (ref 31.4–37.4)
MCV RBC AUTO: 80 FL (ref 82–98)
MONOCYTES # BLD AUTO: 1.07 THOUSAND/ΜL (ref 0.17–1.22)
MONOCYTES NFR BLD AUTO: 6 % (ref 4–12)
NEUTROPHILS # BLD AUTO: 13.58 THOUSANDS/ΜL (ref 1.85–7.62)
NEUTS SEG NFR BLD AUTO: 80 % (ref 43–75)
NRBC BLD AUTO-RTO: 0 /100 WBCS
PLATELET # BLD AUTO: 288 THOUSANDS/UL (ref 149–390)
PMV BLD AUTO: 10.5 FL (ref 8.9–12.7)
POTASSIUM SERPL-SCNC: 3.2 MMOL/L (ref 3.5–5.3)
POTASSIUM SERPL-SCNC: 3.5 MMOL/L (ref 3.5–5.3)
PROCALCITONIN SERPL-MCNC: 0.13 NG/ML
PROT SERPL-MCNC: 7.6 G/DL (ref 6.4–8.2)
RBC # BLD AUTO: 4.94 MILLION/UL (ref 3.81–5.12)
SODIUM SERPL-SCNC: 140 MMOL/L (ref 136–145)
SODIUM SERPL-SCNC: 142 MMOL/L (ref 136–145)
T4 FREE SERPL-MCNC: 1.23 NG/DL (ref 0.76–1.46)
WBC # BLD AUTO: 17.2 THOUSAND/UL (ref 4.31–10.16)

## 2022-03-03 PROCEDURE — 93975 VASCULAR STUDY: CPT

## 2022-03-03 PROCEDURE — 84439 ASSAY OF FREE THYROXINE: CPT | Performed by: INTERNAL MEDICINE

## 2022-03-03 PROCEDURE — 80053 COMPREHEN METABOLIC PANEL: CPT | Performed by: PHYSICIAN ASSISTANT

## 2022-03-03 PROCEDURE — 82024 ASSAY OF ACTH: CPT | Performed by: INTERNAL MEDICINE

## 2022-03-03 PROCEDURE — 80048 BASIC METABOLIC PNL TOTAL CA: CPT | Performed by: INTERNAL MEDICINE

## 2022-03-03 PROCEDURE — 85025 COMPLETE CBC W/AUTO DIFF WBC: CPT | Performed by: PHYSICIAN ASSISTANT

## 2022-03-03 PROCEDURE — 76705 ECHO EXAM OF ABDOMEN: CPT

## 2022-03-03 PROCEDURE — C9113 INJ PANTOPRAZOLE SODIUM, VIA: HCPCS | Performed by: PHYSICIAN ASSISTANT

## 2022-03-03 PROCEDURE — 84145 PROCALCITONIN (PCT): CPT | Performed by: PHYSICIAN ASSISTANT

## 2022-03-03 PROCEDURE — 99232 SBSQ HOSP IP/OBS MODERATE 35: CPT | Performed by: PHYSICIAN ASSISTANT

## 2022-03-03 PROCEDURE — 99232 SBSQ HOSP IP/OBS MODERATE 35: CPT | Performed by: INTERNAL MEDICINE

## 2022-03-03 PROCEDURE — 36415 COLL VENOUS BLD VENIPUNCTURE: CPT | Performed by: INTERNAL MEDICINE

## 2022-03-03 PROCEDURE — 82533 TOTAL CORTISOL: CPT | Performed by: INTERNAL MEDICINE

## 2022-03-03 PROCEDURE — 82948 REAGENT STRIP/BLOOD GLUCOSE: CPT

## 2022-03-03 PROCEDURE — 99223 1ST HOSP IP/OBS HIGH 75: CPT | Performed by: INTERNAL MEDICINE

## 2022-03-03 PROCEDURE — 99222 1ST HOSP IP/OBS MODERATE 55: CPT | Performed by: INTERNAL MEDICINE

## 2022-03-03 RX ORDER — FAMOTIDINE 20 MG/1
20 TABLET, FILM COATED ORAL 2 TIMES DAILY
Status: DISCONTINUED | OUTPATIENT
Start: 2022-03-03 | End: 2022-03-09 | Stop reason: HOSPADM

## 2022-03-03 RX ORDER — NIFEDIPINE 30 MG/1
30 TABLET, EXTENDED RELEASE ORAL DAILY
Status: DISCONTINUED | OUTPATIENT
Start: 2022-03-03 | End: 2022-03-03

## 2022-03-03 RX ORDER — POTASSIUM CHLORIDE 20 MEQ/1
40 TABLET, EXTENDED RELEASE ORAL ONCE
Status: COMPLETED | OUTPATIENT
Start: 2022-03-03 | End: 2022-03-03

## 2022-03-03 RX ORDER — HEPARIN SODIUM 5000 [USP'U]/ML
5000 INJECTION, SOLUTION INTRAVENOUS; SUBCUTANEOUS EVERY 8 HOURS SCHEDULED
Status: DISPENSED | OUTPATIENT
Start: 2022-03-03 | End: 2022-03-06

## 2022-03-03 RX ORDER — NIFEDIPINE 30 MG/1
30 TABLET, EXTENDED RELEASE ORAL ONCE
Status: COMPLETED | OUTPATIENT
Start: 2022-03-03 | End: 2022-03-03

## 2022-03-03 RX ORDER — NIFEDIPINE 30 MG/1
60 TABLET, EXTENDED RELEASE ORAL DAILY
Status: DISCONTINUED | OUTPATIENT
Start: 2022-03-04 | End: 2022-03-04

## 2022-03-03 RX ORDER — FUROSEMIDE 10 MG/ML
40 INJECTION INTRAMUSCULAR; INTRAVENOUS ONCE
Status: COMPLETED | OUTPATIENT
Start: 2022-03-03 | End: 2022-03-03

## 2022-03-03 RX ORDER — LABETALOL 20 MG/4 ML (5 MG/ML) INTRAVENOUS SYRINGE
20 ONCE
Status: COMPLETED | OUTPATIENT
Start: 2022-03-03 | End: 2022-03-03

## 2022-03-03 RX ORDER — HYDROMORPHONE HCL/PF 1 MG/ML
0.5 SYRINGE (ML) INJECTION EVERY 4 HOURS PRN
Status: DISCONTINUED | OUTPATIENT
Start: 2022-03-03 | End: 2022-03-06

## 2022-03-03 RX ORDER — POTASSIUM CHLORIDE 14.9 MG/ML
20 INJECTION INTRAVENOUS ONCE
Status: COMPLETED | OUTPATIENT
Start: 2022-03-03 | End: 2022-03-03

## 2022-03-03 RX ORDER — POTASSIUM CHLORIDE 20 MEQ/1
40 TABLET, EXTENDED RELEASE ORAL ONCE
Status: DISCONTINUED | OUTPATIENT
Start: 2022-03-03 | End: 2022-03-03

## 2022-03-03 RX ORDER — AMLODIPINE BESYLATE 5 MG/1
5 TABLET ORAL DAILY
Status: DISCONTINUED | OUTPATIENT
Start: 2022-03-03 | End: 2022-03-03

## 2022-03-03 RX ORDER — HYDROMORPHONE HCL/PF 1 MG/ML
0.5 SYRINGE (ML) INJECTION ONCE
Status: COMPLETED | OUTPATIENT
Start: 2022-03-03 | End: 2022-03-03

## 2022-03-03 RX ORDER — CARVEDILOL 12.5 MG/1
25 TABLET ORAL 2 TIMES DAILY WITH MEALS
Status: DISCONTINUED | OUTPATIENT
Start: 2022-03-03 | End: 2022-03-09 | Stop reason: HOSPADM

## 2022-03-03 RX ORDER — LABETALOL 20 MG/4 ML (5 MG/ML) INTRAVENOUS SYRINGE
20
Status: DISCONTINUED | OUTPATIENT
Start: 2022-03-03 | End: 2022-03-06

## 2022-03-03 RX ORDER — LISINOPRIL 5 MG/1
10 TABLET ORAL DAILY
Status: DISCONTINUED | OUTPATIENT
Start: 2022-03-03 | End: 2022-03-03

## 2022-03-03 RX ORDER — SUCRALFATE ORAL 1 G/10ML
1000 SUSPENSION ORAL EVERY 6 HOURS SCHEDULED
Status: DISCONTINUED | OUTPATIENT
Start: 2022-03-03 | End: 2022-03-03

## 2022-03-03 RX ORDER — SUCRALFATE 1 G/1
1 TABLET ORAL EVERY 6 HOURS SCHEDULED
Status: DISCONTINUED | OUTPATIENT
Start: 2022-03-03 | End: 2022-03-09 | Stop reason: HOSPADM

## 2022-03-03 RX ADMIN — FAMOTIDINE 20 MG: 20 TABLET ORAL at 18:40

## 2022-03-03 RX ADMIN — FUROSEMIDE 40 MG: 10 INJECTION, SOLUTION INTRAMUSCULAR; INTRAVENOUS at 12:29

## 2022-03-03 RX ADMIN — HYDROMORPHONE HYDROCHLORIDE 0.5 MG: 1 INJECTION, SOLUTION INTRAMUSCULAR; INTRAVENOUS; SUBCUTANEOUS at 05:11

## 2022-03-03 RX ADMIN — HYDROMORPHONE HYDROCHLORIDE 0.2 MG: 1 INJECTION, SOLUTION INTRAMUSCULAR; INTRAVENOUS; SUBCUTANEOUS at 01:27

## 2022-03-03 RX ADMIN — LABETALOL HYDROCHLORIDE 20 MG: 5 INJECTION, SOLUTION INTRAVENOUS at 09:09

## 2022-03-03 RX ADMIN — LISINOPRIL 10 MG: 5 TABLET ORAL at 06:33

## 2022-03-03 RX ADMIN — HEPARIN SODIUM 5000 UNITS: 5000 INJECTION INTRAVENOUS; SUBCUTANEOUS at 12:36

## 2022-03-03 RX ADMIN — SUCRALFATE 1 G: 1 TABLET ORAL at 12:30

## 2022-03-03 RX ADMIN — PRAVASTATIN SODIUM 20 MG: 20 TABLET ORAL at 16:47

## 2022-03-03 RX ADMIN — SUCRALFATE 1 G: 1 TABLET ORAL at 18:40

## 2022-03-03 RX ADMIN — PANTOPRAZOLE SODIUM 40 MG: 40 INJECTION, POWDER, FOR SOLUTION INTRAVENOUS at 09:09

## 2022-03-03 RX ADMIN — ALUMINA, MAGNESIA, AND SIMETHICONE ORAL SUSPENSION REGULAR STRENGTH 30 ML: 1200; 1200; 120 SUSPENSION ORAL at 05:23

## 2022-03-03 RX ADMIN — LABETALOL HYDROCHLORIDE 20 MG: 5 INJECTION, SOLUTION INTRAVENOUS at 06:33

## 2022-03-03 RX ADMIN — CARVEDILOL 25 MG: 12.5 TABLET, FILM COATED ORAL at 07:11

## 2022-03-03 RX ADMIN — PANTOPRAZOLE SODIUM 40 MG: 40 INJECTION, POWDER, FOR SOLUTION INTRAVENOUS at 21:11

## 2022-03-03 RX ADMIN — SUCRALFATE 1 G: 1 TABLET ORAL at 09:09

## 2022-03-03 RX ADMIN — POTASSIUM CHLORIDE 40 MEQ: 1500 TABLET, EXTENDED RELEASE ORAL at 06:33

## 2022-03-03 RX ADMIN — NIFEDIPINE 30 MG: 30 TABLET, FILM COATED, EXTENDED RELEASE ORAL at 12:28

## 2022-03-03 RX ADMIN — CARVEDILOL 25 MG: 12.5 TABLET, FILM COATED ORAL at 16:47

## 2022-03-03 RX ADMIN — LABETALOL HYDROCHLORIDE 10 MG: 5 INJECTION, SOLUTION INTRAVENOUS at 01:39

## 2022-03-03 RX ADMIN — SODIUM CHLORIDE 3 UNITS/HR: 9 INJECTION, SOLUTION INTRAVENOUS at 05:25

## 2022-03-03 RX ADMIN — POTASSIUM CHLORIDE 20 MEQ: 200 INJECTION, SOLUTION INTRAVENOUS at 03:53

## 2022-03-03 RX ADMIN — NIFEDIPINE 30 MG: 30 TABLET, FILM COATED, EXTENDED RELEASE ORAL at 09:40

## 2022-03-03 RX ADMIN — FAMOTIDINE 20 MG: 10 INJECTION, SOLUTION INTRAVENOUS at 05:13

## 2022-03-03 RX ADMIN — METOCLOPRAMIDE HYDROCHLORIDE 10 MG: 5 INJECTION INTRAMUSCULAR; INTRAVENOUS at 05:10

## 2022-03-03 RX ADMIN — HEPARIN SODIUM 5000 UNITS: 5000 INJECTION INTRAVENOUS; SUBCUTANEOUS at 21:11

## 2022-03-03 NOTE — CASE MANAGEMENT
Case Management Assessment    Patient name Saint Bucy Location S /S -01 MRN [id number]  : 1945 Date 3/3/2022       Current Admission Date: 3/2/2022  Current Admission Diagnosis:Abdominal pain with nausea and vomiting   Patient Active Problem List    Diagnosis Date Noted    GARFIELD (acute kidney injury) ([facility] ) 2022    Hypertensive urgency 2022    Lactic acidosis 2022    DKA (diabetic ketoacidosis) ([facility] ) 2022    Elevated brain natriuretic peptide (BNP) level 2022    use of anticoagulation 2022    SIRS (systemic inflammatory response syndrome) ([place] [address] ) 2022    Abdominal pain with nausea and vomiting 2022      LOS (days): 1  Geometric Mean LOS (GMLOS) (days): 2 90  Days to GMLOS:1 7     OBJECTIVE:    Risk of Unplanned Readmission Score: 11         Current admission status: Inpatient       Preferred Pharmacy:   PATIENT/FAMILY REPORTS NO PREFERRED PHARMACY  No address on file      Primary Care Provider: Petra Dillard MD    Primary Insurance: MEDICARE  Secondary Insurance: [address] [place] [address]    ASSESSMENT:  Active Health Care Agents    There are no active Health Care Agents on file  Readmission Root Cause  30 Day Readmission: No    Patient Information  Admitted from[de-identified] Home  Mental Status: Alert  During Assessment patient was accompanied by: Not accompanied during assessment  Assessment information provided by[de-identified] Son  Primary Caregiver: Self  Support Systems: Self,Son,Family members  Home entry access options  Select all that apply : Stairs  Number of steps to enter home  : 1  Do the steps have railings?: Yes  Type of Current Residence: 2 story home  Upon entering residence, is there a bedroom on the main floor (no further steps)?: No  A bedroom is located on the following floor levels of residence (select all that apply):: 2nd Floor  Upon entering residence, is there a bathroom on the main floor (no further steps)?: No  Indicate which floors of current residence have a bathroom (select all the apply):: 2nd Floor  Number of steps to 2nd floor from main floor: 8  In the last 12 months, was there a time when you were not able to pay the mortgage or rent on time?: No  In the last 12 months, was there a time when you did not have a steady place to sleep or slept in a shelter (including now)?: No  Homeless/housing insecurity resource given?: N/A  Living Arrangements: Lives w/ Son    Activities of Daily Living Prior to Admission  Functional Status: Independent  Completes ADLs independently?: Yes  Ambulates independently?: Yes  Does patient use assisted devices?: No  Does patient currently own DME?: Yes  What DME does the patient currently own?: [employer]  Does patient have a history of Outpatient Therapy (PT/OT)?: No  Does the patient have a history of Short-Term Rehab?: No  Does patient have a history of HHC?: No  Does patient currently have [employer] ?: No         Patient Information Continued  Income Source: Pension/prison  Does patient have prescription coverage?: Yes  Within the past 12 months, you worried that your food would run out before you got the money to buy more : Never true  Within the past 12 months, the food you bought just didnt last and you didnt have money to get more : Never true  Food insecurity resource given?: N/A  Does patient receive dialysis treatments?: No  Does patient have a history of substance abuse?: No  Does patient have a history of Mental Health Diagnosis?: No    PHQ 2/9 Screening   Reviewed PHQ 2/9 Depression Screening Score?: No    Means of Transportation  Means of Transport to Appts[de-identified] Family transport  In the past 12 months, has lack of transportation kept you from medical appointments or from getting medications?: No  In the past 12 months, has lack of transportation kept you from meetings, work, or from getting things needed for daily living?: No  Was application for public transport provided?: N/A

## 2022-03-03 NOTE — ASSESSMENT & PLAN NOTE
· No baseline creatinine with which to compare but noted to be 1 2 on admission and now elevated to 1 4-1 5  · Highly suspect she has underlying CKD in the setting of uncontrolled hypertension and diabetes  · Will ask for Nephrology assistance

## 2022-03-03 NOTE — ASSESSMENT & PLAN NOTE
Lab Results   Component Value Date    HGBA1C 9 6 (H) 03/02/2022       Recent Labs     03/03/22  0131 03/03/22  0357 03/03/22  0626 03/03/22  0821   POCGLU 169* 156* 154* 147*       Blood Sugar Average: Last 72 hrs:  (P) 828 [id number]   · Patient presented with nausea, vomiting, epigastric pain and was found to have severe hyperglycemia   Mildly elevated anion gap and mildly elevated beta hydroxybutyrate  · Stopped oral hypoglycemic agents including Amaryl, metformin and Januvia  · Initially provided IV fluids which have now been stopped   · Continue DKA protocol insulin drip  · A1c consistent with very poor control  · Consulted endocrinology for assistance--unclear if patient is capable of managing insulin at home if needed

## 2022-03-03 NOTE — ASSESSMENT & PLAN NOTE
· Patient with worsening leukocytosis, tachycardia and lactic acidosis (resolved now) present on admission --etiology unclear  · Possible viral gastroenteritis, stress reaction  · Continue to trend

## 2022-03-03 NOTE — PROGRESS NOTES
[facility]  Progress Note Abhinav James 1945, 78 y o  female MRN: [id number]  Unit/Bed#: S -01 Encounter: [id number]  Primary Care Provider: Ganesh Velasquez MD   Date and time admitted to hospital: 3/2/2022  5:21 AM    * Abdominal pain with nausea and vomiting  Assessment & Plan  · Patient brought in for abdominal pain with nausea and vomiting, Polish speaking only and provides minimal history but noted to be frequently belching and moaning on admission  Noted to have abdominal TTP  · CTA C/A/P on admission was unremarkable aside from mild fatty liver  · Lab work with elevated total bilirubin but normal LFTs, as well as SIRS criteria  · Patient today reporting no improvement despite IV Protonix, Pepcid, antiemetics and antacids  Will add Carafate  Will ask for GI involvement in case additional additional studies like EGD are necessary tomorrow prior to the weekend  · Will check right upper quadrant ultrasound to ensure no biliary ductal dilatation  Patient is status post cholecystectomy  · Clear liquids for now and advance as tolerated when improved  ·     DKA (diabetic ketoacidosis) [facility])  Assessment & Plan  Lab Results   Component Value Date    HGBA1C 9 6 (H) 03/02/2022       Recent Labs     03/03/22  0131 03/03/22  0357 03/03/22  0626 03/03/22  0821   POCGLU 169* 156* 154* 147*       Blood Sugar Average: Last 72 hrs:  (P) 975 [id number]   · Patient presented with nausea, vomiting, epigastric pain and was found to have severe hyperglycemia   Mildly elevated anion gap and mildly elevated beta hydroxybutyrate  · Stopped oral hypoglycemic agents including Amaryl, metformin and Januvia  · Initially provided IV fluids which have now been stopped   · Continue DKA protocol insulin drip  · A1c consistent with very poor control  · Consulted endocrinology for assistance--unclear if patient is capable of managing insulin at home if needed    Hypertensive urgency  Assessment & Plan  · Profoundly elevated blood pressure on admission at 270/133, with associated nonspecific EKG changes  · Initially responded well to doses of labetalol provided in the ER with some improvement in blood pressure but again spiking BPs  >200 this am   Case was discussed with critical care who felt that she could remain on the regular medical floor and advised increasing IV labetalol to 20 mg Q 2 hours for now until blood pressure is under better control  · Continue home dose of Coreg 25 mg p o  BID  · Lisinopril dose added this morning but would not recommend continuing at this time in the setting of unstable creatinine  · Consulted nephrology for assistance    GARFIELD (acute kidney injury) ([facility] )  Assessment & Plan  · No baseline creatinine with which to compare but noted to be 1 2 on admission and now elevated to 1 4-1 5  · Highly suspect she has underlying CKD in the setting of uncontrolled hypertension and diabetes  · Will ask for Nephrology assistance    SIRS (systemic inflammatory response syndrome) ([facility] )  Assessment & Plan  · Patient with worsening leukocytosis, tachycardia and lactic acidosis (resolved now) present on admission --etiology unclear  · Possible viral gastroenteritis, stress reaction  · Continue to trend      use of anticoagulation  Assessment & Plan  · On xarelto but no clear indication why; no documented history of afib or VTE  · Will hold Xarelto today in case need for any invasive intervention in setting of persistent abdominal pain    Elevated brain natriuretic peptide (BNP) level  Assessment & Plan  · No previous levels on file to compare, check echocardiogram non-urgently  · No current evidence of heart failure      VTE Pharmacologic Prophylaxis:   Pharmacologic:  Hold Xarelto for now, try to obtain additional information of why she is on in the 1st place    Provide subcu heparin in the meantime  Mechanical VTE Prophylaxis in Place: No    Patient Centered Rounds: Spoke with ER nurse    Discussions with Specialists or Other Care Team Provider:  Spoke with Nephrology, Critical Care, my colleague from night shift, radiology, my attending (in detail), GI    Education and Discussions with Family / Patient:  Again attempted to reach patient's son, left another message, have not heard back from him at all  Time Spent for Care: 40 min  More than 50% of total time spent on counseling and coordination of care as described above  Current Length of Stay: 1 day(s)    Current Patient Status: Inpatient   Certification Statement: The patient will continue to require additional inpatient hospital stay due to [facility]    Discharge Plan: not medically stable    Code Status: Level 1 - Full Code    Subjective: Attempted to obtain history from patient using Google translate  Noted that she is able to sometimes provide simple yes no answers in English as well as [place]  Patient denies chest pain, shortness of breath, headache, blurred vision  She is still however reporting abdominal pain and tells me that it is not any better than yesterday  She has not had any events of nausea, vomiting, diarrhea, or bleeding according to nursing  She has not had any known urinary retention issues though I did request that nursing do bladder scans    Objective:     Vitals:   Temp (24hrs), Av 5 °F (36 9 °C), Min:98 4 °F (36 9 °C), Max:98 5 °F (36 9 °C)    Temp:  [98 4 °F (36 9 °C)-98 5 °F (36 9 °C)] 98 4 °F (36 9 °C)  HR:  [74-98] 79  Resp:  [16-20] 16  BP: (115-244)/() 214/110  SpO2:  [92 %-100 %] 92 %  There is no height or weight on file to calculate BMI  Input and Output Summary (last 24 hours): Intake/Output Summary (Last 24 hours) at 3/3/2022 0941  Last data filed at 3/3/2022 0741  Gross per 24 hour   Intake 500 ml   Output --   Net 500 ml       Physical Exam:     Physical Exam  Vitals reviewed  Constitutional:       General: She is not in acute distress       Appearance: She is obese  She is not ill-appearing, toxic-appearing or diaphoretic  Comments: Uncomfortable appearing female again seen lying on her side in bed   Eyes:      General: No scleral icterus  Right eye: No discharge  Left eye: No discharge  Conjunctiva/sclera: Conjunctivae normal    Cardiovascular:      Rate and Rhythm: Normal rate and regular rhythm  Heart sounds: No murmur heard  Pulmonary:      Effort: No respiratory distress  Breath sounds: No stridor  No wheezing or rhonchi  Comments: 2 L nasal cannula oxygen in place  No dyspnea or tachypnea noted  Abdominal:      General: There is no distension  Palpations: Abdomen is soft  Tenderness: There is abdominal tenderness (When asked where she has the most discomfort she points to her central abdomen  Noted to have overall evidence of mild tenderness to palpation visible in bilateral upper quadrants and central   No visible reaction to palpation in the lower abdomen)  There is no guarding  Musculoskeletal:         General: No signs of injury  Right lower leg: No edema  Left lower leg: No edema  Skin:     General: Skin is warm and dry  Coloration: Skin is not jaundiced or pale  Findings: No bruising, erythema, lesion or rash  Neurological:      Mental Status: She is alert        Comments: Awake alert interactive           Additional Data:     Labs:    Results from last 7 days   Lab Units 03/03/22  0551   WBC Thousand/uL 17 20*   HEMOGLOBIN g/dL 13 8   HEMATOCRIT % 39 7   PLATELETS Thousands/uL 288   NEUTROS PCT % 80*   LYMPHS PCT % 14   MONOS PCT % 6   EOS PCT % 0     Results from last 7 days   Lab Units 03/03/22  0551   SODIUM mmol/L 140   POTASSIUM mmol/L 3 5   CHLORIDE mmol/L 103   CO2 mmol/L 29   BUN mg/dL 25   CREATININE mg/dL 1 43*   ANION GAP mmol/L 8   CALCIUM mg/dL 9 4   ALBUMIN g/dL 3 6   TOTAL BILIRUBIN mg/dL 1 36*   ALK PHOS U/L 62   ALT U/L 29   AST U/L 18   GLUCOSE RANDOM mg/dL 165*     Results from last 7 days   Lab Units 03/02/22  0951   INR  1 02     Results from last 7 days   Lab Units 03/03/22  0821 03/03/22  0626 03/03/22  0357 03/03/22  0131 03/02/22  2255 03/02/22  2015 03/02/22  1811 03/02/22  1608 03/02/22  1356 03/02/22  1223 03/02/22  1131 03/02/22  0705   POC GLUCOSE mg/dl 147* 154* 156* 169* 175* 203* 220* 252* 364* 401* 440* 396*     Results from last 7 days   Lab Units 03/02/22  0951   HEMOGLOBIN A1C % 9 6*     Results from last 7 days   Lab Units 03/02/22  1806 03/02/22  1545 03/02/22  0951 03/02/22  0547   LACTIC ACID mmol/L 2 0 3 1* 2 1* 2 3*       * I Have Reviewed All Lab Data Listed Above  * Additional Pertinent Lab Tests Reviewed: [facility] 68 Admission Reviewed    Imaging:    Imaging Reports Reviewed Today Include: CTA  Imaging Personally Reviewed by Myself Includes:      Recent Cultures (last 7 days):     Results from last 7 days   Lab Units 03/02/22  0630 03/02/22  0612 03/02/22  0547   BLOOD CULTURE   --  Received in Microbiology Lab  Culture in Progress  Received in Microbiology Lab  Culture in Progress     URINE CULTURE  No Growth <1000 cfu/mL  --   --        Last 24 Hours Medication List:   Current Facility-Administered Medications   Medication Dose Route Frequency Provider Last Rate    aluminum-magnesium hydroxide-simethicone  30 mL Oral Q4H PRN Galina Melendrez PA-C      carvedilol  25 mg Oral BID With Meals Dilanicie AMIRAH Clancy      famotidine  20 mg Oral BID Felicie AMIRAH Clancy      HYDROmorphone  0 5 mg Intravenous Q4H PRN Felicie AMIRAH Clancy      insulin regular (HumuLIN R,NovoLIN R) infusion  0 3-21 Units/hr Intravenous Titrated Galina Melendrez PA-C 3 Units/hr (03/03/22 0820)    Labetalol HCl  20 mg Intravenous Q2H PRN Max 3/day Brennen Park PA-C      metoclopramide  10 mg Intravenous Q6H PRN Galina Melendrez PA-C      NIFEdipine  30 mg Oral Daily STELLA Valencia      ondansetron  4 mg Intravenous Q4H PRN [place]nen [place], AMIRAH      pantoprazole  40 mg Intravenous Q12H [address] Galina Melendrez PA-C      pravastatin  20 mg Oral Daily With [employer] InstrumentsAMIRAH      sucralfate  1 g Oral Q6H [address] Galina Melendrez PA-C          Today, Patient Was Seen By: Saul Flanagan PA-C    ** Please Note: Dictation voice to text software may have been used in the creation of this document   **

## 2022-03-03 NOTE — ASSESSMENT & PLAN NOTE
Patient contacted clinic to speak with Dr. Carlos nurse to follow up after his procedure that was scheduled on 04/06/2021. He stated no one has called him so he wanted to contact the clinic. Patient can reached at [phone].   · On xarelto but no clear indication why; no documented history of afib or VTE  · Will hold Xarelto today in case need for any invasive intervention in setting of persistent abdominal pain

## 2022-03-03 NOTE — ASSESSMENT & PLAN NOTE
· Patient brought in for abdominal pain with nausea and vomiting, Polish speaking only and provides minimal history but noted to be frequently belching and moaning on admission  Noted to have abdominal TTP  · CTA C/A/P on admission was unremarkable aside from mild fatty liver  · Lab work with elevated total bilirubin but normal LFTs, as well as SIRS criteria  · Patient today reporting no improvement despite IV Protonix, Pepcid, antiemetics and antacids  Will add Carafate  Will ask for GI involvement in case additional additional studies like EGD are necessary tomorrow prior to the weekend  · Will check right upper quadrant ultrasound to ensure no biliary ductal dilatation    Patient is status post cholecystectomy  · Clear liquids for now and advance as tolerated when improved  ·

## 2022-03-03 NOTE — PROGRESS NOTES
Progress Note - Monica Waite 78 y o  female MRN: [id number]    Unit/Bed#: S -01 Encounter: [id number]      CC: Diabetes management    Subjective:   Monica Waite is a 78y o  year old female with type 2 diabetes mellitus, hypertensive urgency complains today of being weak and reporting abdominal pain  Used interpretor to [employer]AR[employer] with the patient and she denied nausea/vomiting but mentioned that she was not able to eat anything today besides drinking water  Objective:     Vitals: Blood pressure (!) 214/110, pulse 79, temperature 98 4 °F (36 9 °C), temperature source Oral, resp  rate 16, weight 81 6 kg (180 lb), SpO2 92 %  ,There is no height or weight on file to calculate BMI  Physical Exam:  General Appearance: awake, appears weak and ill  Head: Normocephalic, without obvious abnormality, atraumatic  Extremities: moves all extremities  Skin: Skin color pale and temperature normal  Striae present on the abdomen   Pulm: no labored breathing, on room air  Cardiovascular normal heart rate and rhythm, no murmur heard  Abdomen  tenderness upper left and right quadrant as well as epigastric area    Lab, Imaging and other studies: I have personally reviewed pertinent reports  Assessment:  1  Diabetes type 2 without long-term insulin therapy, hyperglycemia  Lab Results   Component Value Date    GLUC 165 (H) 03/03/2022    GLUC 168 (H) 03/03/2022    GLUC 203 (H) 03/02/2022    GLUC 229 (H) 03/02/2022    GLUC 417 (H) 03/02/2022     · A1c 9 6  · Anion gap resolved  Poor oral intake  · CT abdomen revealed 2 6 x 2 1 cm fat density left adrenal nodule compatible with myelolipoma  · Suspicion for Cushing syndrome: will check late night cortisol and 24- hour urinary free cortisol excretion  Plan:  Continue insulin drip for now due to  poor oral intake    2  Abdominal pain  Nausea/Vomiting- GI on board CT abdomen and RUQ ultrasound ruled out any acute abnormalities  3  Leukocytosis  4  Hypertensive urgency Nephrology on board         Portions of the record may have been created with voice recognition software  Occasional wrong word or "sound a like" substitutions may have occurred due to the inherent limitations of voice recognition software  Read the chart carefully and recognize, using context, where substitutions have occurred

## 2022-03-03 NOTE — CONSULTS
Raymond Dave 78 y o  female MRN: [id number]  Unit/Bed#: ED 12 Encounter: [id number]    ASSESSMENT and PLAN:  1  Hypertensive emergency:  · Blood pressure 270/133 on admission 3/2  · Received multiple doses of IV labetalol within decline in blood pressure to 116/55 in approximately 14 hours  · Also started on Coreg 25 mg p o  B i d  Which appears to be home medication  · Imaging:  Left adrenal nodule compatible with myelolipoma noted  · Initiated on lisinopril 10 mg earlier this morning  · Personally checked blood pressure on arrival to the nursing unit at approximately 0900 hours  Blood pressure 214/110  · Patient comfortable at this time no abdominal pain  · Plan:    · Start nifedipine XL 30 mg daily  A 2nd dose can be given later today if blood pressure remains elevated  · Will likely need to initiate diuretic at some point at this time will hold due to no clear indication of volume overload  · Secondary workup considering imaging findings of adrenal nodule, intermittently low potassium in accelerated hypertension  · No use of spirolactone at this time  Will check renin/aldosterone levels in the a m  · Renal artery Doppler  · Continue p r n  IV labetalol  · Cautiously lower blood pressure/avoid over-correction  · Goal blood pressure between 296-974 systolic at this time  2  Elevated creatinine:  · Creatinine 1 26 on admission 3/2 with subsequent levels between 1 4-1 5  · Baseline creatinine unclear  · Contrast exposure with CTA on admission  · Imaging:  CT no hydronephrosis, kidneys unremarkable  · In May 2016 creatinine near 1, eGFR 58  · Urinalysis:  Greater than 3+ protein, 2-4 RBCs, +1 ketones, specific gravity 1 020  · GARFIELD versus CKD  · Will need to continue to trend at steady state  Strongly suspect CKD in the setting of diabetes and uncontrolled hypertension  · Started on ACE-inhibitor earlier this morning by primary service    Will continue to closely monitor renal function at this time  I did start nifedipine later in the morning which can be used in place of ACE-inhibitor and dose escalated for management of hypertension if renal function worsens  3  Electrolytes:  · Potassium normal on admission decreasing to 3 2 earlier this morning  · Replete  4  Diabetes mellitus type 2:  Controlled on oral medication  · Endocrinology following for possible mild DKA  · Beta hydroxybutyrate levels 0 8  · On insulin infusion  5  Abdominal pain, nausea and vomiting:  · Given Zofran, Mylanta Pepcid  · Comfortably lying in bed at this time  Other:  · Fatty liver noted on imaging  · Upper lobe lung nodules: If patient at low risk no further follow-up required if high risk CT of the chest 12 months should be considered    HISTORY OF PRESENT ILLNESS:  Requesting Physician: Ishaan Dowling*  Reason for Consult:  Uncontrolled hypertension, CKD versus GARFIELD, hypertensive emergency    Luis Enrique Moore is a 78 y o  female with a past medical history hypertension, diabetes mellitus type 2, schizophrenia who was admitted to [facility]  after presenting with abdominal pain nausea, vomiting, fatigue  Patient is primarily [place] speaking but upon further discussion she is able to understand a fair amount of English in responding wish  According to chart review/patient's son reported that she is noncompliant with medical care, diagnosed with diabetes mellitus over 10 years ago  On oral medicine for control of diabetes  No prior history of CKD, retinopathy  Paucity of prior data  Patient just moved here from [place]  Five years ago creatinine near 1 with a GFR 58  No prior urinalysis  A renal consultation is requested today for assistance in the management of hypertensive emergency, abnormal renal function studies    PAST MEDICAL HISTORY:  History reviewed  No pertinent past medical history  PAST SURGICAL HISTORY:  History reviewed   No pertinent surgical history  ALLERGIES:  Allergies   Allergen Reactions    Penicillins Other (See Comments)     Unknown         SOCIAL HISTORY:  Social History     Substance and Sexual Activity   Alcohol Use Not Currently     Social History     Substance and Sexual Activity   Drug Use Not Currently     Social History     Tobacco Use   Smoking Status Never Smoker   Smokeless Tobacco Never Used       FAMILY HISTORY:  History reviewed  No pertinent family history      MEDICATIONS:    Current Facility-Administered Medications:     aluminum-magnesium hydroxide-simethicone (MYLANTA) oral suspension 30 mL, 30 mL, Oral, Q4H PRN, Galina Melendrez PA-C, 30 mL at 03/03/22 0523    carvedilol (COREG) tablet 25 mg, 25 mg, Oral, BID With Meals, CRISTHIAN Cruz-NATO, 25 mg at 03/03/22 0711    famotidine (PEPCID) tablet 20 mg, 20 mg, Oral, BID, Geraldine Cantor PA-C    HYDROmorphone (DILAUDID) injection 0 5 mg, 0 5 mg, Intravenous, Q4H PRN, Geraldine Cantor PA-C    insulin regular (HumuLIN R,NovoLIN R) 1 Units/mL in sodium chloride 0 9 % 100 mL infusion, 0 3-21 Units/hr, Intravenous, Titrated, Galina Melendrez PA-C, Last Rate: 3 mL/hr at 03/03/22 0525, 3 Units/hr at 03/03/22 0525    Labetalol HCl (NORMODYNE) injection 20 mg, 20 mg, Intravenous, Q2H PRN Max 3/day, Galina Melendrez PA-C    metoclopramide (REGLAN) injection 10 mg, 10 mg, Intravenous, Q6H PRN, Galina Melendrez PA-C, 10 mg at 03/03/22 0510    ondansetron (ZOFRAN) injection 4 mg, 4 mg, Intravenous, Q4H PRN, Galina Melendrez PA-C    pantoprazole (PROTONIX) injection 40 mg, 40 mg, Intravenous, Q12H [address], CRISTHIAN Martinez-NATO, 40 mg at 03/02/22 2012    pravastatin (PRAVACHOL) tablet 20 mg, 20 mg, Oral, Daily With Dinner, Galina Melendrez PA-C, 20 mg at 03/02/22 1624    rivaroxaban (XARELTO) tablet 20 mg, 20 mg, Oral, Daily With Breakfast, Galina Melendrez PA-C    Current Outpatient Medications:     carvedilol (COREG) 25 mg tablet, Take 25 mg by mouth 2 (two) times a day with meals, Disp: , Rfl:   cetirizine (ZyrTEC) 10 mg tablet, Take 10 mg by mouth daily, Disp: , Rfl:     glimepiride (AMARYL) 4 mg tablet, Take 4 mg by mouth every morning before breakfast, Disp: , Rfl:     meclizine (ANTIVERT) 25 mg tablet, Take 25 mg by mouth every 12 (twelve) hours as needed for dizziness, Disp: , Rfl:     metFORMIN (GLUCOPHAGE) 1000 MG tablet, Take 1,000 mg by mouth 2 (two) times a day with meals, Disp: , Rfl:     rivaroxaban (XARELTO) 20 mg tablet, Take 20 mg by mouth, Disp: , Rfl:     simvastatin (ZOCOR) 10 mg tablet, Take 10 mg by mouth daily at bedtime, Disp: , Rfl:     sitaGLIPtin (JANUVIA) 100 mg tablet, Take 100 mg by mouth daily, Disp: , Rfl:     REVIEW OF SYSTEMS:  Language barrier but does understand some English and can respond in English-speaking  GI, at this time denies abdominal pain  That has improved since admission  Cardiovascular  No chest pain  No shortness of breath  Neuromuscular:  No leg pain  Neuro:  No focal weakness  Skin:  No rash  General:  No fever  :  No difficulty urinating  General:  Complains of feeling cold/ambient temperature rather cool  All the systems were reviewed and were negative except as documented on the HPI  PHYSICAL EXAM:  Current Weight: Weight - Scale: 81 6 kg (180 lb)  First Weight: Weight - Scale: 81 6 kg (180 lb)  Vitals:    03/03/22 0300 03/03/22 0430 03/03/22 0600 03/03/22 0659   BP: (!) 181/84 (!) 200/107 (!) 238/100 (!) 237/112   Pulse: 82 88 94 82   Resp: 18 20 19 18   Temp:       TempSrc:       SpO2: 98% 97% 97% 96%   Weight:           Intake/Output Summary (Last 24 hours) at 3/3/2022 0754  Last data filed at 3/3/2022 0741  Gross per 24 hour   Intake 500 ml   Output --   Net 500 ml     Physical Exam  Constitutional:       General: She is not in acute distress  Appearance: Normal appearance  She is well-developed  She is obese  She is not ill-appearing, toxic-appearing or diaphoretic  HENT:      Head: Normocephalic and atraumatic        Nose: Nose normal  No congestion  Mouth/Throat:      Mouth: Mucous membranes are moist       Pharynx: No oropharyngeal exudate  Eyes:      General: No scleral icterus  Right eye: No discharge  Left eye: No discharge  Extraocular Movements: Extraocular movements intact  Conjunctiva/sclera: Conjunctivae normal    Neck:      Vascular: No carotid bruit or JVD  Trachea: No tracheal deviation  Cardiovascular:      Rate and Rhythm: Normal rate and regular rhythm  Heart sounds: No murmur heard  No friction rub  No gallop  Pulmonary:      Effort: Pulmonary effort is normal  No respiratory distress  Breath sounds: Normal breath sounds  No stridor  No wheezing or rhonchi  Chest:      Chest wall: No tenderness  Abdominal:      General: Bowel sounds are normal  There is no distension  Palpations: Abdomen is soft  There is no mass  Tenderness: There is abdominal tenderness  There is no guarding or rebound  Musculoskeletal:         General: No swelling, tenderness or signs of injury  Normal range of motion  Cervical back: Normal range of motion and neck supple  No rigidity or tenderness  Right lower le+ Edema present  Left lower le+ Edema present  Skin:     General: Skin is warm and dry  Capillary Refill: Capillary refill takes less than 2 seconds  Coloration: Skin is not pale  Findings: No erythema or rash  Neurological:      General: No focal deficit present  Mental Status: She is alert     Psychiatric:         Mood and Affect: Mood normal          Behavior: Behavior normal            Invasive Devices:      Lab Results:   Results from last 7 days   Lab Units 22  0551 22  0145 22  1806 22  0612 22  0547   WBC Thousand/uL 17 20*  --   --   --   --  14 84*   HEMOGLOBIN g/dL 13 8  --   --   --   --  15 1   HEMATOCRIT % 39 7  --   --   --   --  43 2   PLATELETS Thousands/uL 288  -- --   --   --  293   POTASSIUM mmol/L 3 5 3 2* 3 4*   < > 3 7  --    CHLORIDE mmol/L 103 102 103   < > 96*  --    CO2 mmol/L 29 29 30   < > 25  --    BUN mg/dL 25 23 21   < > 18  --    CREATININE mg/dL 1 43* 1 43* 1 50*   < > 1 26  --    CALCIUM mg/dL 9 4 9 3 9 5   < > 10 0  --    ALK PHOS U/L 62  --   --   --  72  --    ALT U/L 29  --   --   --  35  --    AST U/L 18  --   --   --  26  --     < > = values in this interval not displayed       Other Studies:

## 2022-03-03 NOTE — QUICK NOTE
Patient's son wanted to give some information about patient's medical condition  She has h/o 'Limited Delusional Disorder' and was admitted for Inpatient Psych in [place] too - according to patient's son Garrison Dexter  He recommended to talk to patient's PCP for more information

## 2022-03-03 NOTE — QUICK NOTE
Early this morning the patient is complaining of severe abdominal pain  Also with hypertensive urgency  Her abdominal pain responded to Mylanta, Zofran, Pepcid  Her pain is now resolved  As for her hypertension as of 0620 her blood pressure is 710 systolic  Recommend giving labetalol 20 mg IV now and starting p o  Lisinopril 10 mg daily in addition to her home carvedilol  Will give oral antihypertensive regimen now  Watch blood pressure closely throughout the morning      Nubia Jarrell PA-C

## 2022-03-03 NOTE — CONSULTS
Consultation - 126 [facility]  Akanksha Bernard 78 y o  female MRN: [id number]  Unit/Bed#: S -01 Encounter: [id number]        Consults    Reason for Consult / Principal Problem: Abdominal pain, nausea and vomiting    HPI: Akanksha Bernard is a 78y o  year old female recently emigrated from [place] a couple of months ago who presented to emergency room with concerns for acute onset of nausea, vomiting and abdominal pain  History is primarily obtained from chart review and from telephone discussion with her son who speaks [place], the patient speaks relatively limited English though can answer some yes or no questions  She was found with elevated glucose, hypertensive urgency, for which she is being treated  Right upper quadrant ultrasound with Dopplers was unremarkable, patient's son tells me she underwent cholecystectomy over 20 years ago  CT scan of the abdomen and pelvis and chest with contrast showed no evidence of any acute process either  Patient reportedly takes Xarelto and also Creon at home, although the patient's son is not aware of why  He does not think she has ever had endoscopy or colonoscopy before  He reports the patient's sister had pancreatic cancer  He thinks the patient's symptoms have been going on for less than 48 hours  Nursing reports she has not had any vomiting episodes since arriving to the floor from the ER, no diarrhea episodes or any rectal bleeding  REVIEW OF SYSTEMS:    CONSTITUTIONAL: Denies any fever, chills, or rigors  Good appetite, and no recent weight loss  HEENT: No earache or tinnitus  Denies hearing loss or visual disturbances  CARDIOVASCULAR: No chest pain or palpitations  RESPIRATORY: Denies any cough, hemoptysis, shortness of breath or dyspnea on exertion  GASTROINTESTINAL: As noted in the History of Present Illness  GENITOURINARY: No problems with urination  Denies any hematuria or dysuria    NEUROLOGIC: No dizziness or vertigo, denies headaches  MUSCULOSKELETAL: Denies any muscle or joint pain  SKIN: Denies skin rashes or itching  ENDOCRINE: Denies excessive thirst  Denies intolerance to heat or cold  PSYCHOSOCIAL: Denies depression or anxiety  Denies any recent memory loss  Historical Information   History reviewed  No pertinent past medical history  History reviewed  No pertinent surgical history  Social History   Social History     Substance and Sexual Activity   Alcohol Use Not Currently     Social History     Substance and Sexual Activity   Drug Use Not Currently     Social History     Tobacco Use   Smoking Status Never Smoker   Smokeless Tobacco Never Used     History reviewed  No pertinent family history      Meds/Allergies     Medications Prior to Admission   Medication    carvedilol (COREG) 25 mg tablet    cetirizine (ZyrTEC) 10 mg tablet    glimepiride (AMARYL) 4 mg tablet    meclizine (ANTIVERT) 25 mg tablet    metFORMIN (GLUCOPHAGE) 1000 MG tablet    rivaroxaban (XARELTO) 20 mg tablet    simvastatin (ZOCOR) 10 mg tablet    sitaGLIPtin (JANUVIA) 100 mg tablet     Current Facility-Administered Medications   Medication Dose Route Frequency    aluminum-magnesium hydroxide-simethicone (MYLANTA) oral suspension 30 mL  30 mL Oral Q4H PRN    carvedilol (COREG) tablet 25 mg  25 mg Oral BID With Meals    famotidine (PEPCID) tablet 20 mg  20 mg Oral BID    heparin (porcine) subcutaneous injection 5,000 Units  5,000 Units Subcutaneous Q8H [address]    HYDROmorphone (DILAUDID) injection 0 5 mg  0 5 mg Intravenous Q4H PRN    insulin regular (HumuLIN R,NovoLIN R) 1 Units/mL in sodium chloride 0 9 % 100 mL infusion  0 3-21 Units/hr Intravenous Titrated    Labetalol HCl (NORMODYNE) injection 20 mg  20 mg Intravenous Q2H PRN Max 3/day    metoclopramide (REGLAN) injection 10 mg  10 mg Intravenous Q6H PRN    [START ON 3/4/2022] NIFEdipine (PROCARDIA XL) 24 hr tablet 60 mg  60 mg Oral Daily    ondansetron (ZOFRAN) injection 4 mg  4 mg Intravenous Q4H PRN    pantoprazole (PROTONIX) injection 40 mg  40 mg Intravenous Q12H [address]    pravastatin (PRAVACHOL) tablet 20 mg  20 mg Oral Daily With Dinner    sucralfate (CARAFATE) tablet 1 g  1 g Oral Q6H [address]       Allergies   Allergen Reactions    Penicillins Other (See Comments)     Unknown             Objective     Blood pressure (!) 185/80, pulse 105, temperature 98 3 °F (36 8 °C), temperature source Oral, resp  rate 18, weight 81 6 kg (180 lb), SpO2 92 %  Intake/Output Summary (Last 24 hours) at 3/3/2022 1637  Last data filed at 3/3/2022 1545  Gross per 24 hour   Intake 500 ml   Output 200 ml   Net 300 ml         PHYSICAL EXAM     General Appearance:   Alert, cooperative, no distress, appears stated age    HEENT:   Normocephalic, atraumatic, anicteric      Neck:  Supple, symmetrical, trachea midline, no adenopathy;    thyroid: no enlargement/tenderness/nodules; no carotid  bruit or JVD    Lungs:   Clear to auscultation bilaterally; no rales, rhonchi or wheezing; respirations unlabored    Heart[de-identified]   S1 and S2 normal; regular rate and rhythm; no murmur, rub, or gallop     Abdomen:   Soft, non-tender, non-distended; normal bowel sounds; no masses, no organomegaly    Genitalia:   Deferred    Rectal:   Deferred    Extremities:  No cyanosis, clubbing or edema    Pulses:  2+ and symmetric all extremities    Skin:  Skin color, texture, turgor normal, no rashes or lesions    Lymph nodes:  No palpable cervical, axillary or inguinal lymphadenopathy        Lab Results:   Admission on 03/02/2022   Component Date Value    WBC 03/02/2022 14 84*    RBC 03/02/2022 5 46*    Hemoglobin 03/02/2022 15 1     Hematocrit 03/02/2022 43 2     MCV 03/02/2022 79*    MCH 03/02/2022 27 7     MCHC 03/02/2022 35 0     RDW 03/02/2022 13 8     MPV 03/02/2022 10 8     Platelets 38/64/0581 293     nRBC 03/02/2022 0     Neutrophils Relative 03/02/2022 87*    Immat GRANS % 03/02/2022 1     Lymphocytes Relative 03/02/2022 10*    Monocytes Relative 03/02/2022 2*    Eosinophils Relative 03/02/2022 0     Basophils Relative 03/02/2022 0     Neutrophils Absolute 03/02/2022 12 91*    Immature Grans Absolute 03/02/2022 0 13     Lymphocytes Absolute 03/02/2022 1 44     Monocytes Absolute 03/02/2022 0 30     Eosinophils Absolute 03/02/2022 0 00     Basophils Absolute 03/02/2022 0 06     Sodium 03/02/2022 135*    Potassium 03/02/2022 3 7     Chloride 03/02/2022 96*    CO2 03/02/2022 25     ANION GAP 03/02/2022 14*    BUN 03/02/2022 18     Creatinine 03/02/2022 1 26     Glucose 03/02/2022 417*    Calcium 03/02/2022 10 0     AST 03/02/2022 26     ALT 03/02/2022 35     Alkaline Phosphatase 03/02/2022 72     Total Protein 03/02/2022 8 4*    Albumin 03/02/2022 4 1     Total Bilirubin 03/02/2022 2 01*    eGFR 03/02/2022 42     Lipase 03/02/2022 185     hs TnI 0hr 03/02/2022 13     Blood Culture 03/02/2022 No Growth at 24 hrs   Blood Culture 03/02/2022 No Growth at 24 hrs   LACTIC ACID 03/02/2022 2 3*    SARS-CoV-2 03/02/2022 Negative     INFLUENZA A PCR 03/02/2022 Negative     INFLUENZA B PCR 03/02/2022 Negative     RSV PCR 03/02/2022 Negative     Color, UA 03/02/2022 Yellow     Clarity, UA 03/02/2022 Clear     Specific Gravity, UA 03/02/2022 1 020     pH, UA 03/02/2022 7 5     Leukocytes, UA 03/02/2022 Elevated glucose may cause decreased leukocyte values   See urine microscopic for [facility] result/*    Nitrite, UA 03/02/2022 Negative     Protein, UA 03/02/2022 >=300*    Glucose, UA 03/02/2022 >=1000 (1%)*    Ketones, UA 03/02/2022 15 (1+)*    Urobilinogen, UA 03/02/2022 0 2     Bilirubin, UA 03/02/2022 Negative     Blood, UA 03/02/2022 Small*    Urine Culture 03/02/2022 No Growth <1000 cfu/mL     hs TnI 2hr 03/02/2022 17     Delta 2hr hsTnI 03/02/2022 4     LACTIC ACID 03/02/2022 2 1*    RBC, UA 03/02/2022 2-4     WBC, UA 03/02/2022 0-1     Epithelial Cells 03/02/2022 Occasional     Bacteria, UA 03/02/2022 Occasional     pH, [place] 03/02/2022 7  347     pCO2, [place] 03/02/2022 52 0*    pO2, [place] 03/02/2022 36 4     HCO3, [place] 03/02/2022 27 9     Base Excess, [place] 03/02/2022 1 2     O2 Content, [place] 03/02/2022 13 3     O2 HGB, VENOUS 03/02/2022 64 5     BETA-HYDROXYBUTYRATE 03/02/2022 0 8*    Ammonia 03/02/2022 <10*    POC Glucose 03/02/2022 396*    hs TnI 4hr 03/02/2022 16     Delta 4hr hsTnI 03/02/2022 3     Protime 03/02/2022 13 4     INR 03/02/2022 1 02     PTT 03/02/2022 24     Ventricular Rate 03/02/2022 105     Atrial Rate 03/02/2022 105     KS Interval 03/02/2022 172     QRSD Interval 03/02/2022 90     QT Interval 03/02/2022 336     QTC Interval 03/02/2022 444     P Axis 03/02/2022 69     QRS Axis 03/02/2022 -44     T Wave Axis 03/02/2022 36     Hemoglobin A1C 03/02/2022 9 6*    EAG 03/02/2022 229     NT-proBNP 03/02/2022 4,572*    POC Glucose 03/02/2022 440*    POC Glucose 03/02/2022 401*    LACTIC ACID 03/02/2022 3 1*    POC Glucose 03/02/2022 364*    TSH 3RD GENERATON 03/02/2022 1 496     POC Glucose 03/02/2022 252*    LACTIC ACID 03/02/2022 2 0     Sodium 03/02/2022 141     Potassium 03/02/2022 4 0     Chloride 03/02/2022 100     CO2 03/02/2022 28     ANION GAP 03/02/2022 13     BUN 03/02/2022 20     Creatinine 03/02/2022 1 46*    Glucose 03/02/2022 229*    Calcium 03/02/2022 9 4     eGFR 03/02/2022 35     Sodium 03/02/2022 141     Potassium 03/02/2022 3 4*    Chloride 03/02/2022 103     CO2 03/02/2022 30     ANION GAP 03/02/2022 8     BUN 03/02/2022 21     Creatinine 03/02/2022 1 50*    Glucose 03/02/2022 203*    Calcium 03/02/2022 9 5     eGFR 03/02/2022 34     POC Glucose 03/02/2022 220*    POC Glucose 03/02/2022 203*    POC Glucose 03/02/2022 175*    Sodium 03/03/2022 142     Potassium 03/03/2022 3 2*    Chloride 03/03/2022 102     CO2 03/03/2022 29     ANION GAP 03/03/2022 11     BUN 03/03/2022 23     Creatinine 03/03/2022 1 43*    Glucose 03/03/2022 168*    Calcium 03/03/2022 9 3     eGFR 03/03/2022 36     POC Glucose 03/03/2022 169*    WBC 03/03/2022 17 20*    RBC 03/03/2022 4 94     Hemoglobin 03/03/2022 13 8     Hematocrit 03/03/2022 39 7     MCV 03/03/2022 80*    MCH 03/03/2022 27 9     MCHC 03/03/2022 34 8     RDW 03/03/2022 14 3     MPV 03/03/2022 10 5     Platelets 30/52/4214 288     nRBC 03/03/2022 0     Neutrophils Relative 03/03/2022 80*    Immat GRANS % 03/03/2022 0     Lymphocytes Relative 03/03/2022 14     Monocytes Relative 03/03/2022 6     Eosinophils Relative 03/03/2022 0     Basophils Relative 03/03/2022 0     Neutrophils Absolute 03/03/2022 13 58*    Immature Grans Absolute 03/03/2022 0 07     Lymphocytes Absolute 03/03/2022 2 42     Monocytes Absolute 03/03/2022 1 07     Eosinophils Absolute 03/03/2022 0 00     Basophils Absolute 03/03/2022 0 06     Sodium 03/03/2022 140     Potassium 03/03/2022 3 5     Chloride 03/03/2022 103     CO2 03/03/2022 29     ANION GAP 03/03/2022 8     BUN 03/03/2022 25     Creatinine 03/03/2022 1 43*    Glucose 03/03/2022 165*    Calcium 03/03/2022 9 4     AST 03/03/2022 18     ALT 03/03/2022 29     Alkaline Phosphatase 03/03/2022 62     Total Protein 03/03/2022 7 6     Albumin 03/03/2022 3 6     Total Bilirubin 03/03/2022 1 36*    eGFR 03/03/2022 36     POC Glucose 03/03/2022 156*    POC Glucose 03/03/2022 154*    Procalcitonin 03/03/2022 0 13     POC Glucose 03/03/2022 147*    POC Glucose 03/03/2022 151*    POC Glucose 03/03/2022 138     POC Glucose 03/03/2022 210*    POC Glucose 03/03/2022 220*       Imaging Studies: I have personally reviewed pertinent reports        RIGHT UPPER QUADRANT ULTRASOUND WITH LIVER DOPPLER     INDICATION:     abdominal pain      COMPARISON:  3/2/2022     TECHNIQUE:   Real-time ultrasound of the right upper quadrant was performed with a curvilinear transducer with both volumetric sweeps and still imaging techniques  Color and spectral Doppler evaluation of the hepatic vasculature was performed      FINDINGS:     PANCREAS:  Visualized portions of the pancreas are within normal limits      AORTA AND IVC:  Visualized portions are normal for patient age      LIVER:  Size:  Within normal range  The liver measures 11 5 cm in the midclavicular line  Contour:  Surface contour is smooth  Parenchyma: There is mild diffuse increased echogenicity with smooth echotexture, without significant beam attenuation or loss of periportal echogenicity  Most consistent with mild hepatic steatosis  No liver mass identified  LIVER DOPPLER: The main portal vein and primary branch segments are patent and hepatopetal with normal spectral waveform  Hepatic veins are patent  Spectral waveforms within normal limits  Main hepatic artery appears normal size, patent with normal   spectral waveform  BILIARY:  Patient has undergone cholecystectomy  No intrahepatic biliary dilatation  CBD measures 5 0 mm  No choledocholithiasis      KIDNEY:   Right kidney measures 10 6 x 4 5 x 4 0 cm  Volume 99 0 mL  Kidney within normal limits      ASCITES:   None      IMPRESSION:     1  No acute abnormality      2   Mildly echogenic liver consistent with hepatic steatosis      3   Normal liver Dopplers        ASSESSMENT/PLAN:     1   Acute onset of nausea vomiting abdominal pain with unremarkable liver enzymes, pancreatic enzymes, CT and ultrasound findings; suspect to be related to acute infectious gastroenteritis, viral or bacterial; symptoms also could be related to hyperglycemia, hypertensive urgency, potentially diabetic gastroparesis    - symptomatic management, IV fluids, antiemetics as needed    -monitor abdominal exam, temperature, white blood cell count    -Protonix once to twice daily    -may continue with clear liquid diet for now    -will reassess tomorrow morning, if patient has persistent abdominal pain/vomiting or worsening symptoms, can consider EGD to rule out underlying peptic ulcer disease/gastritis    - I discussed patient's case and plan with her son via telephone    The patient was seen and examined by Dr Claire Benitez, all key medical decisions were made with Dr Claire Benitez  Thank you for allowing us to participate in the care of this pleasant patient  We will follow up with you closely

## 2022-03-03 NOTE — ASSESSMENT & PLAN NOTE
· Profoundly elevated blood pressure on admission at 270/133, with associated nonspecific EKG changes  · Initially responded well to doses of labetalol provided in the ER with some improvement in blood pressure but again spiking BPs  >200 this am   Case was discussed with critical care who felt that she could remain on the regular medical floor and advised increasing IV labetalol to 20 mg Q 2 hours for now until blood pressure is under better control  · Continue home dose of Coreg 25 mg p o   BID  · Lisinopril dose added this morning but would not recommend continuing at this time in the setting of unstable creatinine  · Consulted nephrology for assistance

## 2022-03-04 PROBLEM — F22 DELUSIONAL DISORDER (HCC): Status: ACTIVE | Noted: 2022-03-04

## 2022-03-04 PROBLEM — E87.6 HYPOKALEMIA: Status: ACTIVE | Noted: 2022-03-04

## 2022-03-04 LAB
ALBUMIN SERPL BCP-MCNC: 3.6 G/DL (ref 3.5–5)
ALP SERPL-CCNC: 62 U/L (ref 46–116)
ALT SERPL W P-5'-P-CCNC: 26 U/L (ref 12–78)
ANION GAP SERPL CALCULATED.3IONS-SCNC: 11 MMOL/L (ref 4–13)
AST SERPL W P-5'-P-CCNC: 19 U/L (ref 5–45)
ATRIAL RATE: 95 BPM
BASOPHILS # BLD AUTO: 0.07 THOUSANDS/ΜL (ref 0–0.1)
BASOPHILS NFR BLD AUTO: 0 % (ref 0–1)
BILIRUB SERPL-MCNC: 1.96 MG/DL (ref 0.2–1)
BUN SERPL-MCNC: 35 MG/DL (ref 5–25)
CALCIUM SERPL-MCNC: 9.4 MG/DL (ref 8.3–10.1)
CHLORIDE SERPL-SCNC: 101 MMOL/L (ref 100–108)
CO2 SERPL-SCNC: 27 MMOL/L (ref 21–32)
CORTIS SERPL-MCNC: 35.3 UG/DL
CREAT SERPL-MCNC: 1.61 MG/DL (ref 0.6–1.3)
EOSINOPHIL # BLD AUTO: 0 THOUSAND/ΜL (ref 0–0.61)
EOSINOPHIL NFR BLD AUTO: 0 % (ref 0–6)
ERYTHROCYTE [DISTWIDTH] IN BLOOD BY AUTOMATED COUNT: 14.1 % (ref 11.6–15.1)
GFR SERPL CREATININE-BSD FRML MDRD: 31 ML/MIN/1.73SQ M
GLUCOSE SERPL-MCNC: 114 MG/DL (ref 65–140)
GLUCOSE SERPL-MCNC: 120 MG/DL (ref 65–140)
GLUCOSE SERPL-MCNC: 122 MG/DL (ref 65–140)
GLUCOSE SERPL-MCNC: 134 MG/DL (ref 65–140)
GLUCOSE SERPL-MCNC: 147 MG/DL (ref 65–140)
GLUCOSE SERPL-MCNC: 150 MG/DL (ref 65–140)
GLUCOSE SERPL-MCNC: 164 MG/DL (ref 65–140)
GLUCOSE SERPL-MCNC: 167 MG/DL (ref 65–140)
GLUCOSE SERPL-MCNC: 170 MG/DL (ref 65–140)
GLUCOSE SERPL-MCNC: 178 MG/DL (ref 65–140)
GLUCOSE SERPL-MCNC: 190 MG/DL (ref 65–140)
GLUCOSE SERPL-MCNC: 215 MG/DL (ref 65–140)
GLUCOSE SERPL-MCNC: 87 MG/DL (ref 65–140)
HCT VFR BLD AUTO: 39.5 % (ref 34.8–46.1)
HGB BLD-MCNC: 13.8 G/DL (ref 11.5–15.4)
IMM GRANULOCYTES # BLD AUTO: 0.12 THOUSAND/UL (ref 0–0.2)
IMM GRANULOCYTES NFR BLD AUTO: 1 % (ref 0–2)
LYMPHOCYTES # BLD AUTO: 2.46 THOUSANDS/ΜL (ref 0.6–4.47)
LYMPHOCYTES NFR BLD AUTO: 12 % (ref 14–44)
MCH RBC QN AUTO: 27.5 PG (ref 26.8–34.3)
MCHC RBC AUTO-ENTMCNC: 34.9 G/DL (ref 31.4–37.4)
MCV RBC AUTO: 79 FL (ref 82–98)
MONOCYTES # BLD AUTO: 1.51 THOUSAND/ΜL (ref 0.17–1.22)
MONOCYTES NFR BLD AUTO: 7 % (ref 4–12)
NEUTROPHILS # BLD AUTO: 16.16 THOUSANDS/ΜL (ref 1.85–7.62)
NEUTS SEG NFR BLD AUTO: 80 % (ref 43–75)
NRBC BLD AUTO-RTO: 0 /100 WBCS
P AXIS: 63 DEGREES
PLATELET # BLD AUTO: 281 THOUSANDS/UL (ref 149–390)
PMV BLD AUTO: 10.6 FL (ref 8.9–12.7)
POTASSIUM SERPL-SCNC: 3.3 MMOL/L (ref 3.5–5.3)
PR INTERVAL: 174 MS
PROCALCITONIN SERPL-MCNC: 0.22 NG/ML
PROT SERPL-MCNC: 7.5 G/DL (ref 6.4–8.2)
QRS AXIS: -35 DEGREES
QRSD INTERVAL: 94 MS
QT INTERVAL: 360 MS
QTC INTERVAL: 452 MS
RBC # BLD AUTO: 5.02 MILLION/UL (ref 3.81–5.12)
SODIUM SERPL-SCNC: 139 MMOL/L (ref 136–145)
T WAVE AXIS: 34 DEGREES
VENTRICULAR RATE: 95 BPM
WBC # BLD AUTO: 20.32 THOUSAND/UL (ref 4.31–10.16)

## 2022-03-04 PROCEDURE — 99232 SBSQ HOSP IP/OBS MODERATE 35: CPT | Performed by: INTERNAL MEDICINE

## 2022-03-04 PROCEDURE — 93010 ELECTROCARDIOGRAM REPORT: CPT | Performed by: INTERNAL MEDICINE

## 2022-03-04 PROCEDURE — 80053 COMPREHEN METABOLIC PANEL: CPT | Performed by: PHYSICIAN ASSISTANT

## 2022-03-04 PROCEDURE — 82088 ASSAY OF ALDOSTERONE: CPT | Performed by: NURSE PRACTITIONER

## 2022-03-04 PROCEDURE — 84145 PROCALCITONIN (PCT): CPT | Performed by: PHYSICIAN ASSISTANT

## 2022-03-04 PROCEDURE — C9113 INJ PANTOPRAZOLE SODIUM, VIA: HCPCS | Performed by: PHYSICIAN ASSISTANT

## 2022-03-04 PROCEDURE — 82948 REAGENT STRIP/BLOOD GLUCOSE: CPT

## 2022-03-04 PROCEDURE — 93975 VASCULAR STUDY: CPT | Performed by: SURGERY

## 2022-03-04 PROCEDURE — 84244 ASSAY OF RENIN: CPT | Performed by: NURSE PRACTITIONER

## 2022-03-04 PROCEDURE — 85025 COMPLETE CBC W/AUTO DIFF WBC: CPT | Performed by: PHYSICIAN ASSISTANT

## 2022-03-04 PROCEDURE — 99233 SBSQ HOSP IP/OBS HIGH 50: CPT | Performed by: NURSE PRACTITIONER

## 2022-03-04 PROCEDURE — 99232 SBSQ HOSP IP/OBS MODERATE 35: CPT | Performed by: PHYSICIAN ASSISTANT

## 2022-03-04 PROCEDURE — 83835 ASSAY OF METANEPHRINES: CPT | Performed by: NURSE PRACTITIONER

## 2022-03-04 PROCEDURE — 82384 ASSAY THREE CATECHOLAMINES: CPT | Performed by: NURSE PRACTITIONER

## 2022-03-04 RX ORDER — CHLORTHALIDONE 25 MG/1
12.5 TABLET ORAL DAILY
Status: DISCONTINUED | OUTPATIENT
Start: 2022-03-04 | End: 2022-03-05

## 2022-03-04 RX ORDER — DEXAMETHASONE 2 MG/1
1 TABLET ORAL ONCE
Status: COMPLETED | OUTPATIENT
Start: 2022-03-04 | End: 2022-03-04

## 2022-03-04 RX ORDER — POTASSIUM CHLORIDE 20 MEQ/1
40 TABLET, EXTENDED RELEASE ORAL ONCE
Status: COMPLETED | OUTPATIENT
Start: 2022-03-04 | End: 2022-03-04

## 2022-03-04 RX ORDER — NIFEDIPINE 30 MG/1
30 TABLET, EXTENDED RELEASE ORAL ONCE
Status: COMPLETED | OUTPATIENT
Start: 2022-03-04 | End: 2022-03-04

## 2022-03-04 RX ORDER — NIFEDIPINE 30 MG/1
90 TABLET, EXTENDED RELEASE ORAL DAILY
Status: DISCONTINUED | OUTPATIENT
Start: 2022-03-05 | End: 2022-03-04

## 2022-03-04 RX ORDER — NIFEDIPINE 30 MG/1
90 TABLET, EXTENDED RELEASE ORAL DAILY
Status: DISCONTINUED | OUTPATIENT
Start: 2022-03-05 | End: 2022-03-09 | Stop reason: HOSPADM

## 2022-03-04 RX ADMIN — CHLORTHALIDONE 12.5 MG: 25 TABLET ORAL at 16:02

## 2022-03-04 RX ADMIN — NIFEDIPINE 60 MG: 30 TABLET, FILM COATED, EXTENDED RELEASE ORAL at 08:41

## 2022-03-04 RX ADMIN — PRAVASTATIN SODIUM 20 MG: 20 TABLET ORAL at 16:02

## 2022-03-04 RX ADMIN — HEPARIN SODIUM 5000 UNITS: 5000 INJECTION INTRAVENOUS; SUBCUTANEOUS at 22:35

## 2022-03-04 RX ADMIN — SUCRALFATE 1 G: 1 TABLET ORAL at 06:18

## 2022-03-04 RX ADMIN — DEXAMETHASONE 1 MG: 2 TABLET ORAL at 22:35

## 2022-03-04 RX ADMIN — NIFEDIPINE 30 MG: 30 TABLET, FILM COATED, EXTENDED RELEASE ORAL at 14:17

## 2022-03-04 RX ADMIN — FAMOTIDINE 20 MG: 20 TABLET ORAL at 08:41

## 2022-03-04 RX ADMIN — SUCRALFATE 1 G: 1 TABLET ORAL at 12:29

## 2022-03-04 RX ADMIN — ONDANSETRON 4 MG: 2 INJECTION INTRAMUSCULAR; INTRAVENOUS at 16:10

## 2022-03-04 RX ADMIN — PANTOPRAZOLE SODIUM 40 MG: 40 INJECTION, POWDER, FOR SOLUTION INTRAVENOUS at 08:41

## 2022-03-04 RX ADMIN — CARVEDILOL 25 MG: 12.5 TABLET, FILM COATED ORAL at 07:09

## 2022-03-04 RX ADMIN — SUCRALFATE 1 G: 1 TABLET ORAL at 17:20

## 2022-03-04 RX ADMIN — PANTOPRAZOLE SODIUM 40 MG: 40 INJECTION, POWDER, FOR SOLUTION INTRAVENOUS at 22:35

## 2022-03-04 RX ADMIN — HEPARIN SODIUM 5000 UNITS: 5000 INJECTION INTRAVENOUS; SUBCUTANEOUS at 14:17

## 2022-03-04 RX ADMIN — CARVEDILOL 25 MG: 12.5 TABLET, FILM COATED ORAL at 16:01

## 2022-03-04 RX ADMIN — SUCRALFATE 1 G: 1 TABLET ORAL at 00:40

## 2022-03-04 RX ADMIN — FAMOTIDINE 20 MG: 20 TABLET ORAL at 17:20

## 2022-03-04 RX ADMIN — POTASSIUM CHLORIDE 40 MEQ: 1500 TABLET, EXTENDED RELEASE ORAL at 08:41

## 2022-03-04 NOTE — ASSESSMENT & PLAN NOTE
Lab Results   Component Value Date    HGBA1C 9 6 (H) 03/02/2022       Recent Labs     03/04/22  0400 03/04/22  0613 03/04/22  0841 03/04/22  1010   POCGLU 150* 114 87 122       Blood Sugar Average: Last 72 hrs:  (P) 202 56   · Patient presented with nausea, vomiting, epigastric pain and was found to have severe hyperglycemia  Mildly elevated anion gap and mildly elevated beta hydroxybutyrate  · Stopped oral hypoglycemic agents including Amaryl, metformin and Januvia    Reportedly issues with noncompliance  · Initially provided IV fluids which have been stopped   · Continue DKA protocol insulin drip--now sugars stable ?transition to basal bolus soon  · A1c consistent with very poor control  · Consulted endocrinology for assistance--unclear if patient is capable of managing insulin at home if needed

## 2022-03-04 NOTE — ASSESSMENT & PLAN NOTE
· No baseline creatinine with which to compare but noted to be 1 2 on admission, eyad to 1 4 and now further elevated to 1 6  · Highly suspect she has underlying CKD in the setting of uncontrolled hypertension and diabetes  · Appreciate Nephrology assistance; one dose of lasix was given on 3/3/22

## 2022-03-04 NOTE — PROGRESS NOTES
Progress Note - Manual Pillar 78 y o  female MRN: [id number]    Unit/Bed#: S -01 Encounter: [id number]        Subjective:   Patient looks less distressed today, reports to be feeling better, nursing reports she has not had any vomiting episodes today, patient denies any abdominal pain currently  Objective:     Vitals: Blood pressure (!) 207/91, pulse 91, temperature 98 7 °F (37 1 °C), temperature source Oral, resp  rate 16, weight 81 6 kg (180 lb), SpO2 94 %  ,There is no height or weight on file to calculate BMI  Intake/Output Summary (Last 24 hours) at 3/4/2022 1223  Last data filed at 3/3/2022 1545  Gross per 24 hour   Intake --   Output 200 ml   Net -200 ml       Physical Exam:   General appearance: alert, appears stated age and cooperative  Lungs: clear to auscultation bilaterally, no labored breathing/accessory muscle use  Heart: regular rate and rhythm, S1, S2 normal, no murmur, click, rub or gallop  Abdomen: soft, non-tender; bowel sounds normal; no masses,  no organomegaly  Extremities: no edema    Invasive Devices  Report    Peripheral Intravenous Line            Peripheral IV 03/02/22 Left Antecubital 2 days    Peripheral IV 03/02/22 Right Antecubital 1 day                Lab, Imaging and other studies: I have personally reviewed pertinent reports  No results displayed because visit has over 200 results  Results for Sarah Davis (MRN [id number]) as of 3/4/2022 12:23   Ref   Range 3/4/2022 04:56 3/4/2022 06:13 3/4/2022 08:41 3/4/2022 10:10 3/4/2022 11:57   POC Glucose Latest Ref Range: 65 - 140 mg/dl  114 87 122 120   Sodium Latest Ref Range: 136 - 145 mmol/L 139       Potassium Latest Ref Range: 3 5 - 5 3 mmol/L 3 3 (L)       Chloride Latest Ref Range: 100 - 108 mmol/L 101       CO2 Latest Ref Range: 21 - 32 mmol/L 27       Anion Gap Latest Ref Range: 4 - 13 mmol/L 11       BUN Latest Ref Range: 5 - 25 mg/dL 35 (H)       Creatinine Latest Ref Range: 0 60 - 1 30 mg/dL 1 61 (H)       Glucose, Random Latest Ref Range: 65 - 140 mg/dL 134       Calcium Latest Ref Range: 8 3 - 10 1 mg/dL 9 4       AST Latest Ref Range: 5 - 45 U/L 19       ALT Latest Ref Range: 12 - 78 U/L 26       Alkaline Phosphatase Latest Ref Range: 46 - 116 U/L 62       Total Protein Latest Ref Range: 6 4 - 8 2 g/dL 7 5       Albumin Latest Ref Range: 3 5 - 5 0 g/dL 3 6       TOTAL BILIRUBIN Latest Ref Range: 0 20 - 1 00 mg/dL 1 96 (H)       eGFR Latest Units: ml/min/1 73sq m 31       Procalcitonin Latest Ref Range: <=0 25 ng/ml 0 22       WBC Latest Ref Range: 4 31 - 10 16 Thousand/uL 20 32 (H)       Red Blood Cell Count Latest Ref Range: 3 81 - 5 12 Million/uL 5 02       Hemoglobin Latest Ref Range: 11 5 - 15 4 g/dL 13 8       HCT Latest Ref Range: 34 8 - 46 1 % 39 5       MCV Latest Ref Range: 82 - 98 fL 79 (L)       MCH Latest Ref Range: 26 8 - 34 3 pg 27 5       MCHC Latest Ref Range: 31 4 - 37 4 g/dL 34 9       RDW Latest Ref Range: 11 6 - 15 1 % 14 1       Platelet Count Latest Ref Range: 149 - 390 Thousands/uL 281       MPV Latest Ref Range: 8 9 - 12 7 fL 10 6       nRBC Latest Units: /100 WBCs 0       Neutrophils % Latest Ref Range: 43 - 75 % 80 (H)       Immat GRANS % Latest Ref Range: 0 - 2 % 1       Lymphocytes Relative Latest Ref Range: 14 - 44 % 12 (L)       Monocytes Relative Latest Ref Range: 4 - 12 % 7       Eosinophils Latest Ref Range: 0 - 6 % 0       Basophils Relative Latest Ref Range: 0 - 1 % 0       Immature Grans Absolute Latest Ref Range: 0 00 - 0 20 Thousand/uL 0 12       Absolute Neutrophils Latest Ref Range: 1 85 - 7 62 Thousands/µL 16 16 (H)       Lymphocytes Absolute Latest Ref Range: 0 60 - 4 47 Thousands/µL 2 46       Absolute Monocytes Latest Ref Range: 0 17 - 1 22 Thousand/µL 1 51 (H)       Absolute Eosinophils Latest Ref Range: 0 00 - 0 61 Thousand/µL 0 00       Basophils Absolute Latest Ref Range: 0 00 - 0 10 Thousands/µL 0 07           Assessment/Plan:    1   Acute onset of nausea vomiting and abdominal pain with normal ultrasound and CT scan, normal liver enzymes and lipase; suspect to be related to acute viral gastroenteritis, potentially also related to hypertensive urgency and or hyperglycemia    Appears to be clinically improving at this time    -will hold off on EGD; can consider evaluation if patient has difficulty with advancement of diet and persistent vomiting    -resume liquid diet for now and gradually advance as tolerated    -antiemetics as needed, symptomatic management    -continue pantoprazole, avoid NSAIDs where possible

## 2022-03-04 NOTE — PROGRESS NOTES
Progress Note - Rodolfo Blancas 78 y o  female MRN: [id number]    Unit/Bed#: S -01 Encounter: [id number]      CC: Diabetes management    Subjective:   Rodolfo Blancas is a 78y o  year old female with type 2 diabetes mellitus  On my first visit patient reported feeling better today  Reported improvement in the abdominal pain  Denied nausea, vomiting, dizziness, headache  She mentioned having some juice and tolerated well  Per nurse patient had a cup of orange and cranberry juice, no episodes of vomiting  Visiting her second time with the attending she was complaining of nausea and abdominal pain  Objective:     Vitals: Blood pressure 160/75, pulse 91, temperature 98 7 °F (37 1 °C), temperature source Oral, resp  rate 16, weight 81 6 kg (180 lb), SpO2 94 %  ,There is no height or weight on file to calculate BMI  Physical Exam:  General Appearance: awake, appears stated age and cooperative  Head: Normocephalic, without obvious abnormality, atraumatic  Extremities: moves all extremities  Skin: Skin color and temperature normal    Pulm: no labored breathing  Cardiovascular:  Normal heart rate and rhythm, no murmurs heard    Lab, Imaging and other studies: I have personally reviewed pertinent reports  Assessment:  1  Type 1 diabetes without long-term use of insulin, with hyperglycemia  Patient was on insulin drip overnight  Restarted diet on clear liquids  Patient still complains of abdominal pain  Lab Results   Component Value Date    POCGLU 170 (H) 03/04/2022    POCGLU 120 03/04/2022    POCGLU 122 03/04/2022    POCGLU 87 03/04/2022    POCGLU 114 03/04/2022       2 Hypertensive emergency:   Concern  for Cushing syndrome  · CT abdomen revealed 2 6 x 2 1 cm fat density left adrenal nodule compatible with myelolipoma  · Night cortisol elevated  ·  Recommended dexamethasone suppression test with morning cortisol check       Plan:  · Will continue insulin drip overnight  · Hypoglycemia protocol  · Follow up on dexamethazone  suppression test as well as aldosterone and catecholamines for secondary cause of hypertension  Portions of the record may have been created with voice recognition software  Occasional wrong word or "sound a like" substitutions may have occurred due to the inherent limitations of voice recognition software  Read the chart carefully and recognize, using context, where substitutions have occurred

## 2022-03-04 NOTE — ASSESSMENT & PLAN NOTE
· On xarelto but initially,  indication was unclear    In conversation today using [employer] , patient tells me she has history of atrial fibrillation  · Will keep tele for now given very short run of atach and some ectopy seen on 3/3/22  · Will hold Xarelto temporarily in case need for any invasive intervention in setting of persistent abdominal pain/SIRS

## 2022-03-04 NOTE — ASSESSMENT & PLAN NOTE
· Profoundly elevated blood pressure on admission at 270/133, with associated nonspecific EKG changes  · Case was discussed with critical care who felt that she could remain on the regular medical floor and advised increasing IV labetalol to 20 mg Q 2 hours prn  · BP now improving  · Continue home dose of Coreg 25 mg p o  BID  · Consulted nephrology-- appreciate their input    Nifedipine was initiated and increased  · Addition of ACE-inhibitor would be preferable in the setting of underlying diabetes but holding off given unstable creatinine at this time  · Secondary HTN work up in process

## 2022-03-04 NOTE — ASSESSMENT & PLAN NOTE
· Patient brought in for abdominal pain with nausea and vomiting, Polish speaking only and provided minimal history but noted to be frequently belching and moaning on admission  Noted to have central abdominal TTP  · CTA C/A/P on admission was unremarkable aside from mild fatty liver  · Lab work with elevated total bilirubin but normal LFTs, as well as SIRS criteria  · Patient on 3/3/22 reported no improvement despite IV Protonix, Pepcid, antiemetics and antacids  Also added Carafate  Consulted GI with plan to proceed with EGD today  · Right upper quadrant ultrasound- biliary ductal dilatation    Patient is status post cholecystectomy

## 2022-03-04 NOTE — PLAN OF CARE
Problem: Potential for Falls  Goal: Patient will remain free of falls  Description: INTERVENTIONS:  - Educate patient/family on patient safety including physical limitations  - Instruct patient to call for assistance with activity   - Consult OT/PT to assist with strengthening/mobility   - Keep Call bell within reach  - Keep bed low and locked with side rails adjusted as appropriate  - Keep care items and personal belongings within reach  - Initiate and maintain comfort rounds  - Make Fall Risk Sign visible to staff  - Offer Toileting every 3 Hours, in advance of need  - Initiate/Maintain bed alarm  - Obtain necessary fall risk management equipment:    - Apply yellow socks and bracelet for high fall risk patients  - Consider moving patient to room near nurses station  Outcome: Progressing     Problem: MOBILITY - ADULT  Goal: Maintain or return to baseline ADL function  Description: INTERVENTIONS:  -  Assess patient's ability to carry out ADLs; assess patient's baseline for ADL function and identify physical deficits which impact ability to perform ADLs (bathing, care of mouth/teeth, toileting, grooming, dressing, etc )  - Assess/evaluate cause of self-care deficits   - Assess range of motion  - Assess patient's mobility; develop plan if impaired  - Assess patient's need for assistive devices and provide as appropriate  - Encourage maximum independence but intervene and supervise when necessary  - Involve family in performance of ADLs  - Assess for home care needs following discharge   - Consider OT consult to assist with ADL evaluation and planning for discharge  - Provide patient education as appropriate  Outcome: Progressing  Goal: Maintains/Returns to pre admission functional level  Description: INTERVENTIONS:  - Perform BMAT or MOVE assessment daily    - Set and communicate daily mobility goal to care team and patient/family/caregiver     - Collaborate with rehabilitation services on mobility goals if consulted  - Perform Range of Motion 3 times a day  - Reposition patient every 2 hours    - Dangle patient 3 times a day  - Stand patient 3 times a day  - Ambulate patient 3 times a day  - Out of bed to chair 3 times a day   - Out of bed for meals 3 times a day  - Out of bed for toileting  - Record patient progress and toleration of activity level   Outcome: Progressing

## 2022-03-04 NOTE — PROGRESS NOTES
[address] [facility] NOTE   Russ Wilson 78 y o  female MRN: [id number]  Unit/Bed#: S -01 Encounter: [id number]  Reason for Consult:  Hypertensive emergency, elevated creatinine    ASSESSMENT and PLAN:  Russ Wilson is a 78 y o  female with a past medical history hypertension, diabetes mellitus type 2, schizophrenia who was admitted to [facility] after presenting with abdominal pain nausea, vomiting, fatigue and hypertensive emergency  Patient recently moved here from [place]  Nephrology consult for management of hypertensive emergency and elevated creatinine  Hypertensive emergency  · 3/2 Blood pressure 270/133 on admission  · Initially treated with multiple doses of IV labetalol  · Oral medications initiated 3/3:  Coreg 25 mg b i d , lisinopril 10 mg, nifedipine XL 30 mg    ·  Due to concerns for contrast exposure and fluctuations in blood pressure lisinopril placed on hold after 1st dose  · 3/4:  Nifedipine increased to 60 mg daily  · Blood pressure continues to have intermittent elevations but overall acceptable at this time  Goal blood pressure 140-160  Avoid over-correction at this time  · Workup in progress:  · Renal artery Doppler pending  · Renin, aldosterone, catecholamines, metanephrines in progress  · Plan:  · Agree with increased dose of nifedipine  · Hold lisinopril at this time  · Await results of workup  · Blood pressure acceptable at this time  Monitor  May need to give a 2nd dose of nifedipine    Elevated creatinine:  · Baseline unknown  · Last available blood work 5 years ago creatinine 1 with a GFR 58  ·  Creatinine 1 26 on admission 3/2 increasing and plateauing between 8 4-1 8  Today creatinine is up to 1 6  Had dose of lisinopril yesterday which may have caused fluctuation  · Continue to hold lisinopril  · Will need to continue to trend at steady state  · Urinalysis greater than 3+ protein, 2-4 RBCs    1+ ketones  · Imaging:  No hydronephrosis, kidneys unremarkable  · GARFIELD versus CKD  Will need to continue to trend at steady state  Strongly suspect CKD in the setting of diabetes and uncontrolled hypertension    Electrolytes:  · Intermittently low-potassium, repleted    Diabetes mellitus type 2:  · On oral medication  · Endocrinology was following for possible mild DKA    Abdominal pain, nausea, vomiting  Evaluated by GI  · Symptoms have abated with treatment  Receive Zofran, Mylanta and Pepcid    SIRS:  · Leukocytosis with tachycardia and lactic acidosis on admission  · Elevated Procalamine  · Negative cultures    Other:  · Fatty liver noted on imaging  Elevated T bili  GI following  · Upper lobe lung nodules  Follow-up required if high risk otherwise no follow-up  · PAF  · Delusional disorder    DISPOSITION:  No changes at this time  Continue to monitor closely  Goal blood pressure 140-160 at this time  Hold Ace and Arb  Can add a 2nd dose of nifedipine if needed    SUBJECTIVE / 24H INTERVAL HISTORY:  No acute complaints  No vomiting  No diarrhea    No abdominal pain  Overnight events reported    OBJECTIVE:  Current Weight: Weight - Scale: 81 6 kg (180 lb)  Vitals:    03/04/22 0223 03/04/22 0711 03/04/22 1044 03/04/22 1226   BP: 161/72 (!) 174/76 (!) 207/91 160/75   BP Location: Right arm  Left arm Right arm   Pulse: 99 99 91    Resp: 18 17 16    Temp: 98 8 °F (37 1 °C) 99 2 °F (37 3 °C) 98 7 °F (37 1 °C)    TempSrc: Oral  Oral    SpO2: 95% 98% 94%    Weight:           Intake/Output Summary (Last 24 hours) at 3/4/2022 1257  Last data filed at 3/3/2022 1545  Gross per 24 hour   Intake --   Output 200 ml   Net -200 ml     General: NAD, comfortably lying in bed  Skin: no rash, warm and dry  Eyes: anicteric sclera  ENT: moist mucous membrane  Neck: supple  Chest: CTA b/l, no ronchii, no wheeze, no rubs, no rales, normal  CVS: s1s2, no murmur, no gallop, no rub, normal rate regular rhythm  Abdomen: soft, nontender, nl sounds, nondistended  Extremities: no edema LE b/l  : no lugo  Neuro:  No acute deficits  Psych: normal affect at this time  Medications:    Current Facility-Administered Medications:     aluminum-magnesium hydroxide-simethicone (MYLANTA) oral suspension 30 mL, 30 mL, Oral, Q4H PRN, Galina Melendrez PA-C, 30 mL at 03/03/22 0523    carvedilol (COREG) tablet 25 mg, 25 mg, Oral, BID With Meals, Oskar Zepeda PA-C, 25 mg at 03/04/22 0709    famotidine (PEPCID) tablet 20 mg, 20 mg, Oral, BID, Oskar Zepeda PA-C, 20 mg at 03/04/22 0841    heparin (porcine) subcutaneous injection 5,000 Units, 5,000 Units, Subcutaneous, Q8H [address], Galina Melendrez PA-C, 5,000 Units at 03/03/22 2111    HYDROmorphone (DILAUDID) injection 0 5 mg, 0 5 mg, Intravenous, Q4H PRN, Oskar Zepeda PA-C    insulin regular (HumuLIN R,NovoLIN R) 1 Units/mL in sodium chloride 0 9 % 100 mL infusion, 0 3-21 Units/hr, Intravenous, Titrated, Galina Melendrez PA-C, Last Rate: 1 5 mL/hr at 03/04/22 1228, 1 5 Units/hr at 03/04/22 1228    Labetalol HCl (NORMODYNE) injection 20 mg, 20 mg, Intravenous, Q2H PRN Max 3/day, Galina Melendrez PA-C, 20 mg at 03/03/22 0909    metoclopramide (REGLAN) injection 10 mg, 10 mg, Intravenous, Q6H PRN, Galina Melendrez PA-C, 10 mg at 03/03/22 0510    NIFEdipine (PROCARDIA XL) 24 hr tablet 60 mg, 60 mg, Oral, Daily, Dona Hay MD, 60 mg at 03/04/22 0841    ondansetron (ZOFRAN) injection 4 mg, 4 mg, Intravenous, Q4H PRN, Galina Melendrez PA-C    pantoprazole (PROTONIX) injection 40 mg, 40 mg, Intravenous, Q12H [address], Galina Melendrez PA-C, 40 mg at 03/04/22 0841    pravastatin (PRAVACHOL) tablet 20 mg, 20 mg, Oral, Daily With Dinner, Galina Melendrez PA-C, 20 mg at 03/03/22 1647    sucralfate (CARAFATE) tablet 1 g, 1 g, Oral, Q6H ROM, Galina Melendrez PA-C, 1 g at 03/04/22 1229    Laboratory Results:  Results from last 7 days   Lab Units 03/04/22  0456 03/03/22  0551 03/03/22  0145 03/02/22  2011 03/02/22  1806 03/02/22  0612 03/02/22  0547   WBC Thousand/uL 20 32* 17 20*  --   --   --   --  14 84*   HEMOGLOBIN g/dL 13 8 13 8  --   --   --   --  15 1   HEMATOCRIT % 39 5 39 7  --   --   --   --  43 2   PLATELETS Thousands/uL 281 288  --   --   --   --  293   POTASSIUM mmol/L 3 3* 3 5 3 2* 3 4* 4 0 3 7  --    CHLORIDE mmol/L 101 103 102 103 100 96*  --    CO2 mmol/L 27 29 29 30 28 25  --    BUN mg/dL 35* 25 23 21 20 18  --    CREATININE mg/dL 1 61* 1 43* 1 43* 1 50* 1 46* 1 26  --    CALCIUM mg/dL 9 4 9 4 9 3 9 5 9 4 10 0  --

## 2022-03-04 NOTE — PROGRESS NOTES
[facility]  Progress Note Cooper [place] 1945, 78 y o  female MRN: [id number]  Unit/Bed#: S -01 Encounter: [id number]  Primary Care Provider: Liss Finn MD   Date and time admitted to hospital: 3/2/2022  5:21 AM    * Abdominal pain with nausea and vomiting  Assessment & Plan  · Patient brought in for abdominal pain with nausea and vomiting, Polish speaking only and provided minimal history but noted to be frequently belching and moaning on admission  Noted to have central abdominal TTP  · CTA C/A/P on admission was unremarkable aside from mild fatty liver  · Lab work with elevated total bilirubin but normal LFTs, as well as SIRS criteria  · Patient on 3/3/22 reported no improvement despite IV Protonix, Pepcid, antiemetics and antacids  Also added Carafate  Consulted GI with plan to proceed with EGD today  · Right upper quadrant ultrasound- biliary ductal dilatation  Patient is status post cholecystectomy        SIRS (systemic inflammatory response syndrome) ([facility] [address] )  Assessment & Plan  · Patient with further worsening leukocytosis, 14-->17-->20; etiology unclear  · tachycardia and lactic acidosis present on admission resolved  · procal also rising 0 13-->0 22; continue to trend  · Blood cultures negative at 48 hours  · Urine Cx negative  · CT C/A/P on admission negative  · Consider ID involvement        DKA (diabetic ketoacidosis) [facility])  Assessment & Plan  Lab Results   Component Value Date    HGBA1C 9 6 (H) 03/02/2022       Recent Labs     03/04/22  0400 03/04/22  0613 03/04/22  0841 03/04/22  1010   POCGLU 150* 114 87 122       Blood Sugar Average: Last 72 hrs:  (P) 202 56   · Patient presented with nausea, vomiting, epigastric pain and was found to have severe hyperglycemia  Mildly elevated anion gap and mildly elevated beta hydroxybutyrate  · Stopped oral hypoglycemic agents including Amaryl, metformin and Januvia    Reportedly issues with noncompliance  · Initially provided IV fluids which have been stopped   · Continue DKA protocol insulin drip--now sugars stable ?transition to basal bolus soon  · A1c consistent with very poor control  · Consulted endocrinology for assistance--unclear if patient is capable of managing insulin at home if needed    Hypertensive urgency  Assessment & Plan  · Profoundly elevated blood pressure on admission at 270/133, with associated nonspecific EKG changes  · Case was discussed with critical care who felt that she could remain on the regular medical floor and advised increasing IV labetalol to 20 mg Q 2 hours prn  · BP now improving  · Continue home dose of Coreg 25 mg p o  BID  · Consulted nephrology-- appreciate their input  Nifedipine was initiated and increased  · Addition of ACE-inhibitor would be preferable in the setting of underlying diabetes but holding off given unstable creatinine at this time  · Secondary HTN work up in process    GARFIELD (acute kidney injury) ([facility] )  Assessment & Plan  · No baseline creatinine with which to compare but noted to be 1 2 on admission, eyad to 1 4 and now further elevated to 1 6  · Highly suspect she has underlying CKD in the setting of uncontrolled hypertension and diabetes  · Appreciate Nephrology assistance; one dose of lasix was given on 3/3/22    Hypokalemia  Assessment & Plan  · Replete and follow    Delusional disorder ([facility] )  Assessment & Plan  · Per history provided by son  Patient has had previous inpatient psychiatric stay in [place]    PAF (paroxysmal atrial fibrillation) ([facility] )  Assessment & Plan  · On xarelto but initially,  indication was unclear    In conversation today using [employer] , patient tells me she has history of atrial fibrillation  · Will keep tele for now given very short run of atach and some ectopy seen on 3/3/22  · Will hold Xarelto temporarily in case need for any invasive intervention in setting of persistent abdominal pain/SIRS    Elevated brain natriuretic peptide (BNP) level  Assessment & Plan  · No previous levels on file to compare, check echocardiogram non-urgently  · No current evidence of heart failure      VTE Pharmacologic Prophylaxis:   Pharmacologic: Heparin  Mechanical VTE Prophylaxis in Place: No    Patient Centered Rounds: I have performed bedside rounds with nursing staff today  Discussions with Specialists or Other Care Team Provider: case mgmt, my attending    Education and Discussions with Family / Patient: have not received any call backs from son    Time Spent for Care: 35 min  More than 50% of total time spent on counseling and coordination of care as described above  Current Length of Stay: 2 day(s)    Current Patient Status: Inpatient   Certification Statement: The patient will continue to require additional inpatient hospital stay due to additional work up    Discharge Plan: home with family when stable  Encourage OOB    Code Status: Level 1 - Full Code    Subjective:   History was obtained using [employer]  Cheyanne Lucero # [id number]  Patient now denies abdominal pain, nausea, vomiting and also denies chest pain, shortness of breath  RN denies any events overnight    Objective:     Vitals:   Temp (24hrs), Av 5 °F (36 9 °C), Min:97 6 °F (36 4 °C), Max:99 2 °F (37 3 °C)    Temp:  [97 6 °F (36 4 °C)-99 2 °F (37 3 °C)] 99 2 °F (37 3 °C)  HR:  [] 99  Resp:  [16-18] 17  BP: (111-232)/() 174/76  SpO2:  [89 %-98 %] 98 %  There is no height or weight on file to calculate BMI  Input and Output Summary (last 24 hours): Intake/Output Summary (Last 24 hours) at 3/4/2022 1037  Last data filed at 3/3/2022 1545  Gross per 24 hour   Intake --   Output 200 ml   Net -200 ml       Physical Exam:     Physical Exam  Vitals reviewed  Constitutional:       General: She is not in acute distress  Appearance: She is obese  She is not ill-appearing, toxic-appearing or diaphoretic        Comments: Comfortable appearing Eyes:      General: No scleral icterus  Right eye: No discharge  Left eye: No discharge  Conjunctiva/sclera: Conjunctivae normal    Cardiovascular:      Rate and Rhythm: Normal rate and regular rhythm  Heart sounds: No murmur heard  Pulmonary:      Effort: No respiratory distress  Breath sounds: No stridor  No wheezing or rhonchi  Abdominal:      General: Bowel sounds are normal  There is no distension  Palpations: Abdomen is soft  Tenderness: There is no abdominal tenderness  There is no guarding  Musculoskeletal:      Right lower leg: No edema  Left lower leg: No edema  Skin:     General: Skin is warm and dry  Coloration: Skin is not jaundiced or pale  Findings: No bruising, erythema, lesion or rash  Neurological:      General: No focal deficit present  Mental Status: She is alert  Mental status is at baseline        Comments: Awake alert interactive   Psychiatric:      Comments: Calm cooperative       Additional Data:     Labs:    Results from last 7 days   Lab Units 03/04/22  0456   WBC Thousand/uL 20 32*   HEMOGLOBIN g/dL 13 8   HEMATOCRIT % 39 5   PLATELETS Thousands/uL 281   NEUTROS PCT % 80*   LYMPHS PCT % 12*   MONOS PCT % 7   EOS PCT % 0     Results from last 7 days   Lab Units 03/04/22  0456   SODIUM mmol/L 139   POTASSIUM mmol/L 3 3*   CHLORIDE mmol/L 101   CO2 mmol/L 27   BUN mg/dL 35*   CREATININE mg/dL 1 61*   ANION GAP mmol/L 11   CALCIUM mg/dL 9 4   ALBUMIN g/dL 3 6   TOTAL BILIRUBIN mg/dL 1 96*   ALK PHOS U/L 62   ALT U/L 26   AST U/L 19   GLUCOSE RANDOM mg/dL 134     Results from last 7 days   Lab Units 03/02/22  0951   INR  1 02     Results from last 7 days   Lab Units 03/04/22  1010 03/04/22  0841 03/04/22  0613 03/04/22  0400 03/04/22  0225 03/04/22  0010 03/03/22  2212 03/03/22  2008 03/03/22  1804 03/03/22  1552 03/03/22  1406 03/03/22  1212   POC GLUCOSE mg/dl 122 87 114 150* 190* 215* 164* 81 145* 220* 210* 138 Results from last 7 days   Lab Units 03/02/22  0951   HEMOGLOBIN A1C % 9 6*     Results from last 7 days   Lab Units 03/04/22  0456 03/03/22  1156 03/02/22  1806 03/02/22  1545 03/02/22  0951 03/02/22  0547   LACTIC ACID mmol/L  --   --  2 0 3 1* 2 1* 2 3*   PROCALCITONIN ng/ml 0 22 0 13  --   --   --   --      Tele reviewed  * I Have Reviewed All Lab Data Listed Above  * Additional Pertinent Lab Tests Reviewed: [place] [address] Admission Reviewed    Imaging:    Imaging Reports Reviewed Today Include:   Imaging Personally Reviewed by Myself Includes:      Recent Cultures (last 7 days):     Results from last 7 days   Lab Units 03/02/22  0630 03/02/22  0612 03/02/22  0547   BLOOD CULTURE   --  No Growth at 48 hrs  No Growth at 48 hrs     URINE CULTURE  No Growth <1000 cfu/mL  --   --        Last 24 Hours Medication List:   Current Facility-Administered Medications   Medication Dose Route Frequency Provider Last Rate    aluminum-magnesium hydroxide-simethicone  30 mL Oral Q4H PRN Galina Melendrez PA-C      carvedilol  25 mg Oral BID With Meals Sridhar Baron PA-C      famotidine  20 mg Oral BID Sridhar Baron PA-C      heparin (porcine)  5,000 Units Subcutaneous Q8H [facility] Galina Tamayo PA-C      HYDROmorphone  0 5 mg Intravenous Q4H PRN rSidhar Baron PA-C      insulin regular (HumuLIN R,NovoLIN R) infusion  0 3-21 Units/hr Intravenous Titrated Galina Melendrez PA-C 1 5 Units/hr (03/04/22 1017)    Labetalol HCl  20 mg Intravenous Q2H PRN Max 3/day Donna Vinson PA-C      metoclopramide  10 mg Intravenous Q6H PRN Galina Melendrez PA-C      NIFEdipine  60 mg Oral Daily Davi Grayson MD      ondansetron  4 mg Intravenous Q4H PRN Galina Melendrez PA-C      pantoprazole  40 mg Intravenous Q12H [facility] Galina Melendrez PA-C      pravastatin  20 mg Oral Daily With [employer]      sucralfate  1 g Oral Q6H [facility] Galina Melendrez PA-C          Today, Patient Was Seen By: Donna Vinson, AMIRAH    ** Please Note: Dictation voice to text software may have been used in the creation of this document   **

## 2022-03-04 NOTE — ASSESSMENT & PLAN NOTE
· Patient with further worsening leukocytosis, 14-->17-->20; etiology unclear  · tachycardia and lactic acidosis present on admission resolved  · procal also rising 0 13-->0 22;  continue to trend  · Blood cultures negative at 48 hours  · Urine Cx negative  · CT C/A/P on admission negative  · Consider ID involvement

## 2022-03-05 LAB
ACTH PLAS-MCNC: 8.4 PG/ML (ref 7.2–63.3)
ALBUMIN SERPL BCP-MCNC: 3.3 G/DL (ref 3.5–5)
ALP SERPL-CCNC: 65 U/L (ref 46–116)
ALT SERPL W P-5'-P-CCNC: 22 U/L (ref 12–78)
ANION GAP SERPL CALCULATED.3IONS-SCNC: 8 MMOL/L (ref 4–13)
AST SERPL W P-5'-P-CCNC: 14 U/L (ref 5–45)
BASOPHILS # BLD AUTO: 0.04 THOUSANDS/ΜL (ref 0–0.1)
BASOPHILS NFR BLD AUTO: 0 % (ref 0–1)
BILIRUB SERPL-MCNC: 1.99 MG/DL (ref 0.2–1)
BUN SERPL-MCNC: 44 MG/DL (ref 5–25)
CALCIUM ALBUM COR SERPL-MCNC: 10.1 MG/DL (ref 8.3–10.1)
CALCIUM SERPL-MCNC: 9.5 MG/DL (ref 8.3–10.1)
CHLORIDE SERPL-SCNC: 102 MMOL/L (ref 100–108)
CO2 SERPL-SCNC: 26 MMOL/L (ref 21–32)
CREAT SERPL-MCNC: 1.6 MG/DL (ref 0.6–1.3)
EOSINOPHIL # BLD AUTO: 0 THOUSAND/ΜL (ref 0–0.61)
EOSINOPHIL NFR BLD AUTO: 0 % (ref 0–6)
ERYTHROCYTE [DISTWIDTH] IN BLOOD BY AUTOMATED COUNT: 14 % (ref 11.6–15.1)
GFR SERPL CREATININE-BSD FRML MDRD: 31 ML/MIN/1.73SQ M
GLUCOSE SERPL-MCNC: 111 MG/DL (ref 65–140)
GLUCOSE SERPL-MCNC: 121 MG/DL (ref 65–140)
GLUCOSE SERPL-MCNC: 126 MG/DL (ref 65–140)
GLUCOSE SERPL-MCNC: 138 MG/DL (ref 65–140)
GLUCOSE SERPL-MCNC: 139 MG/DL (ref 65–140)
GLUCOSE SERPL-MCNC: 139 MG/DL (ref 65–140)
GLUCOSE SERPL-MCNC: 220 MG/DL (ref 65–140)
GLUCOSE SERPL-MCNC: 221 MG/DL (ref 65–140)
GLUCOSE SERPL-MCNC: 259 MG/DL (ref 65–140)
GLUCOSE SERPL-MCNC: 348 MG/DL (ref 65–140)
HCT VFR BLD AUTO: 38.3 % (ref 34.8–46.1)
HGB BLD-MCNC: 13.2 G/DL (ref 11.5–15.4)
IMM GRANULOCYTES # BLD AUTO: 0.07 THOUSAND/UL (ref 0–0.2)
IMM GRANULOCYTES NFR BLD AUTO: 1 % (ref 0–2)
LYMPHOCYTES # BLD AUTO: 1.58 THOUSANDS/ΜL (ref 0.6–4.47)
LYMPHOCYTES NFR BLD AUTO: 11 % (ref 14–44)
MAGNESIUM SERPL-MCNC: 2.3 MG/DL (ref 1.6–2.6)
MCH RBC QN AUTO: 27.5 PG (ref 26.8–34.3)
MCHC RBC AUTO-ENTMCNC: 34.5 G/DL (ref 31.4–37.4)
MCV RBC AUTO: 80 FL (ref 82–98)
MONOCYTES # BLD AUTO: 0.86 THOUSAND/ΜL (ref 0.17–1.22)
MONOCYTES NFR BLD AUTO: 6 % (ref 4–12)
NEUTROPHILS # BLD AUTO: 11.78 THOUSANDS/ΜL (ref 1.85–7.62)
NEUTS SEG NFR BLD AUTO: 82 % (ref 43–75)
NRBC BLD AUTO-RTO: 0 /100 WBCS
PLATELET # BLD AUTO: 265 THOUSANDS/UL (ref 149–390)
PMV BLD AUTO: 11.1 FL (ref 8.9–12.7)
POTASSIUM SERPL-SCNC: 3.7 MMOL/L (ref 3.5–5.3)
PROCALCITONIN SERPL-MCNC: 0.23 NG/ML
PROT SERPL-MCNC: 7.5 G/DL (ref 6.4–8.2)
RBC # BLD AUTO: 4.8 MILLION/UL (ref 3.81–5.12)
SODIUM SERPL-SCNC: 136 MMOL/L (ref 136–145)
WBC # BLD AUTO: 14.33 THOUSAND/UL (ref 4.31–10.16)

## 2022-03-05 PROCEDURE — 82948 REAGENT STRIP/BLOOD GLUCOSE: CPT

## 2022-03-05 PROCEDURE — 85025 COMPLETE CBC W/AUTO DIFF WBC: CPT | Performed by: PHYSICIAN ASSISTANT

## 2022-03-05 PROCEDURE — 99232 SBSQ HOSP IP/OBS MODERATE 35: CPT | Performed by: INTERNAL MEDICINE

## 2022-03-05 PROCEDURE — 84145 PROCALCITONIN (PCT): CPT | Performed by: PHYSICIAN ASSISTANT

## 2022-03-05 PROCEDURE — 83735 ASSAY OF MAGNESIUM: CPT | Performed by: PHYSICIAN ASSISTANT

## 2022-03-05 PROCEDURE — C9113 INJ PANTOPRAZOLE SODIUM, VIA: HCPCS | Performed by: PHYSICIAN ASSISTANT

## 2022-03-05 PROCEDURE — 80053 COMPREHEN METABOLIC PANEL: CPT | Performed by: PHYSICIAN ASSISTANT

## 2022-03-05 RX ORDER — CHLORTHALIDONE 25 MG/1
12.5 TABLET ORAL DAILY
Status: DISCONTINUED | OUTPATIENT
Start: 2022-03-06 | End: 2022-03-09 | Stop reason: HOSPADM

## 2022-03-05 RX ORDER — INSULIN GLARGINE 100 [IU]/ML
15 INJECTION, SOLUTION SUBCUTANEOUS
Status: DISCONTINUED | OUTPATIENT
Start: 2022-03-06 | End: 2022-03-06

## 2022-03-05 RX ORDER — DEXAMETHASONE 2 MG/1
1 TABLET ORAL ONCE
Status: COMPLETED | OUTPATIENT
Start: 2022-03-05 | End: 2022-03-05

## 2022-03-05 RX ORDER — INSULIN GLARGINE 100 [IU]/ML
15 INJECTION, SOLUTION SUBCUTANEOUS ONCE
Status: COMPLETED | OUTPATIENT
Start: 2022-03-05 | End: 2022-03-05

## 2022-03-05 RX ADMIN — PANTOPRAZOLE SODIUM 40 MG: 40 INJECTION, POWDER, FOR SOLUTION INTRAVENOUS at 21:31

## 2022-03-05 RX ADMIN — ONDANSETRON 4 MG: 2 INJECTION INTRAMUSCULAR; INTRAVENOUS at 19:54

## 2022-03-05 RX ADMIN — SUCRALFATE 1 G: 1 TABLET ORAL at 06:19

## 2022-03-05 RX ADMIN — PRAVASTATIN SODIUM 20 MG: 20 TABLET ORAL at 16:10

## 2022-03-05 RX ADMIN — INSULIN LISPRO 2 UNITS: 100 INJECTION, SOLUTION INTRAVENOUS; SUBCUTANEOUS at 21:32

## 2022-03-05 RX ADMIN — PANTOPRAZOLE SODIUM 40 MG: 40 INJECTION, POWDER, FOR SOLUTION INTRAVENOUS at 08:11

## 2022-03-05 RX ADMIN — HEPARIN SODIUM 5000 UNITS: 5000 INJECTION INTRAVENOUS; SUBCUTANEOUS at 06:19

## 2022-03-05 RX ADMIN — SUCRALFATE 1 G: 1 TABLET ORAL at 00:50

## 2022-03-05 RX ADMIN — CARVEDILOL 25 MG: 12.5 TABLET, FILM COATED ORAL at 08:12

## 2022-03-05 RX ADMIN — HEPARIN SODIUM 5000 UNITS: 5000 INJECTION INTRAVENOUS; SUBCUTANEOUS at 15:04

## 2022-03-05 RX ADMIN — CARVEDILOL 25 MG: 12.5 TABLET, FILM COATED ORAL at 16:10

## 2022-03-05 RX ADMIN — ONDANSETRON 4 MG: 2 INJECTION INTRAMUSCULAR; INTRAVENOUS at 15:03

## 2022-03-05 RX ADMIN — DEXAMETHASONE 1 MG: 2 TABLET ORAL at 21:32

## 2022-03-05 RX ADMIN — SUCRALFATE 1 G: 1 TABLET ORAL at 18:47

## 2022-03-05 RX ADMIN — CHLORTHALIDONE 12.5 MG: 25 TABLET ORAL at 08:11

## 2022-03-05 RX ADMIN — FAMOTIDINE 20 MG: 20 TABLET ORAL at 08:11

## 2022-03-05 RX ADMIN — HEPARIN SODIUM 5000 UNITS: 5000 INJECTION INTRAVENOUS; SUBCUTANEOUS at 21:32

## 2022-03-05 RX ADMIN — INSULIN LISPRO 4 UNITS: 100 INJECTION, SOLUTION INTRAVENOUS; SUBCUTANEOUS at 16:11

## 2022-03-05 RX ADMIN — INSULIN LISPRO 5 UNITS: 100 INJECTION, SOLUTION INTRAVENOUS; SUBCUTANEOUS at 16:11

## 2022-03-05 RX ADMIN — FAMOTIDINE 20 MG: 20 TABLET ORAL at 18:47

## 2022-03-05 RX ADMIN — INSULIN GLARGINE 15 UNITS: 100 INJECTION, SOLUTION SUBCUTANEOUS at 15:03

## 2022-03-05 RX ADMIN — NIFEDIPINE 90 MG: 30 TABLET, FILM COATED, EXTENDED RELEASE ORAL at 08:11

## 2022-03-05 NOTE — ASSESSMENT & PLAN NOTE
· On xarelto but initially,  indication was unclear    In conversation today using [place] , patient tells me she has history of atrial fibrillation  · Will hold Xarelto temporarily in case need for any invasive intervention

## 2022-03-05 NOTE — ED PROVIDER NOTES
History  Chief Complaint   Patient presents with    Abdominal Pain     pt c/o abdominal pain and vomiting for 1 day  denies diarrhea/blood in urine or stool  Patient is a 82-year-old female presenting to the emergency room for evaluation of abdominal pain, nausea and vomiting since last night after eating dinner  Patient states she had vegetables for dinner  Patient states she has vomited many times  Since the abdominal pain is constant and is located in the mid abdomen  Patient denies any fever, chills, chest pain, shortness of breath, diarrhea, dysuria  History provided by:  Patient   used: Yes        Prior to Admission Medications   Prescriptions Last Dose Informant Patient Reported? Taking?   carvedilol (COREG) 25 mg tablet   Yes Yes   Sig: Take 25 mg by mouth 2 (two) times a day with meals   cetirizine (ZyrTEC) 10 mg tablet   Yes Yes   Sig: Take 10 mg by mouth daily   glimepiride (AMARYL) 4 mg tablet   Yes Yes   Sig: Take 4 mg by mouth every morning before breakfast   meclizine (ANTIVERT) 25 mg tablet   Yes Yes   Sig: Take 25 mg by mouth every 12 (twelve) hours as needed for dizziness   metFORMIN (GLUCOPHAGE) 1000 MG tablet   Yes Yes   Sig: Take 1,000 mg by mouth 2 (two) times a day with meals   rivaroxaban (XARELTO) 20 mg tablet   Yes Yes   Sig: Take 20 mg by mouth   simvastatin (ZOCOR) 10 mg tablet   Yes Yes   Sig: Take 10 mg by mouth daily at bedtime   sitaGLIPtin (JANUVIA) 100 mg tablet   Yes Yes   Sig: Take 100 mg by mouth daily      Facility-Administered Medications: None       History reviewed  No pertinent past medical history  History reviewed  No pertinent surgical history  History reviewed  No pertinent family history  I have reviewed and agree with the history as documented      E-Cigarette/Vaping     E-Cigarette/Vaping Substances     Social History     Tobacco Use    Smoking status: Never Smoker    Smokeless tobacco: Never Used   Substance Use Topics    Alcohol use: Not Currently    Drug use: Not Currently       Review of Systems   Constitutional: Negative for chills and fever  HENT: Negative for ear pain and sore throat  Eyes: Negative for pain and visual disturbance  Respiratory: Negative for cough and shortness of breath  Cardiovascular: Negative for chest pain and palpitations  Gastrointestinal: Positive for abdominal pain, nausea and vomiting  Genitourinary: Negative for dysuria and hematuria  Musculoskeletal: Negative for arthralgias and back pain  Skin: Negative for color change and rash  Neurological: Negative for seizures and syncope  All other systems reviewed and are negative  Physical Exam  Physical Exam  Vitals and nursing note reviewed  Constitutional:       General: She is not in acute distress  Appearance: She is well-developed  She is ill-appearing  Comments: Hypertensive    HENT:      Head: Normocephalic and atraumatic  Mouth/Throat:      Mouth: Mucous membranes are dry  Eyes:      Conjunctiva/sclera: Conjunctivae normal    Cardiovascular:      Rate and Rhythm: Normal rate and regular rhythm  Heart sounds: No murmur heard  Pulmonary:      Effort: Pulmonary effort is normal  No respiratory distress  Breath sounds: Normal breath sounds  Abdominal:      Palpations: Abdomen is soft  Tenderness: There is abdominal tenderness in the epigastric area and periumbilical area  Musculoskeletal:      Cervical back: Neck supple  Skin:     General: Skin is warm and dry  Neurological:      Mental Status: She is alert           Vital Signs  ED Triage Vitals   Temperature Pulse Respirations Blood Pressure SpO2   03/02/22 0534 03/02/22 0534 03/02/22 0534 03/02/22 0539 03/02/22 0534   97 7 °F (36 5 °C) 105 22 (!) 270/133 98 %      Temp Source Heart Rate Source Patient Position - Orthostatic VS BP Location FiO2 (%)   03/02/22 1119 03/02/22 0815 03/02/22 0815 03/02/22 0815 --   Oral Monitor Lying Right arm       Pain Score       03/02/22 1327       5           Vitals:    03/04/22 1044 03/04/22 1226 03/04/22 1531 03/04/22 1843   BP: (!) 207/91 160/75 157/76 119/58   Pulse: 91  97 81   Patient Position - Orthostatic VS: Lying            Visual Acuity  Visual Acuity      Most Recent Value   L Pupil Size (mm) 3   R Pupil Size (mm) 3   L Pupil Shape Round   R Pupil Shape Round          ED Medications  Medications   insulin regular (HumuLIN R,NovoLIN R) 1 Units/mL in sodium chloride 0 9 % 100 mL infusion (3 Units/hr Intravenous Rate/Dose Verify 3/4/22 2006)   aluminum-magnesium hydroxide-simethicone (MYLANTA) oral suspension 30 mL (30 mL Oral Given 3/3/22 0523)   ondansetron (ZOFRAN) injection 4 mg (4 mg Intravenous Given 3/4/22 1610)   metoclopramide (REGLAN) injection 10 mg (10 mg Intravenous Given 3/3/22 0510)   pantoprazole (PROTONIX) injection 40 mg (40 mg Intravenous Given 3/4/22 0841)   pravastatin (PRAVACHOL) tablet 20 mg (20 mg Oral Given 3/4/22 1602)   HYDROmorphone (DILAUDID) injection 0 5 mg (has no administration in time range)   famotidine (PEPCID) tablet 20 mg (20 mg Oral Given 3/4/22 1720)   carvedilol (COREG) tablet 25 mg (25 mg Oral Given 3/4/22 1601)   Labetalol HCl (NORMODYNE) injection 20 mg (20 mg Intravenous Given 3/3/22 0909)   sucralfate (CARAFATE) tablet 1 g (1 g Oral Given 3/4/22 1720)   heparin (porcine) subcutaneous injection 5,000 Units (5,000 Units Subcutaneous Given 3/4/22 1417)   chlorthalidone tablet 12 5 mg (12 5 mg Oral Given 3/4/22 1602)   NIFEdipine (PROCARDIA XL) 24 hr tablet 90 mg (has no administration in time range)   dexamethasone (DECADRON) tablet 1 mg (has no administration in time range)   ondansetron (ZOFRAN) injection 4 mg (4 mg Intravenous Given 3/2/22 0553)   morphine (PF) 4 mg/mL injection 4 mg (4 mg Intravenous Given 3/2/22 0553)   Labetalol HCl (NORMODYNE) injection 10 mg (10 mg Intravenous Given 3/2/22 0553)   iohexol (OMNIPAQUE) 350 MG/ML injection (SINGLE-DOSE) 100 mL (100 mL Intravenous Given 3/2/22 0620)   Labetalol HCl (NORMODYNE) injection 10 mg (10 mg Intravenous Given 3/2/22 0715)   metoclopramide (REGLAN) injection 10 mg (10 mg Intravenous Given 3/2/22 0746)   Labetalol HCl (NORMODYNE) injection 10 mg (10 mg Intravenous Given 3/2/22 0915)   morphine injection 2 mg (2 mg Intravenous Given 3/2/22 0920)   pantoprazole (PROTONIX) injection 40 mg (40 mg Intravenous Given 3/2/22 1001)   sodium chloride 0 9 % bolus 1,000 mL (0 mL Intravenous Stopped 3/2/22 1520)   Labetalol HCl (NORMODYNE) injection 20 mg (20 mg Intravenous Given 3/2/22 1024)   potassium chloride 20 mEq IVPB (premix) (20 mEq Intravenous New Bag 3/3/22 0353)   HYDROmorphone (DILAUDID) injection 0 5 mg (0 5 mg Intravenous Given 3/3/22 0511)   famotidine (PEPCID) injection 20 mg (20 mg Intravenous Given 3/3/22 0513)   Labetalol HCl (NORMODYNE) injection 20 mg (20 mg Intravenous Given 3/3/22 0633)   potassium chloride (K-DUR,KLOR-CON) CR tablet 40 mEq (40 mEq Oral Given 3/3/22 0633)   furosemide (LASIX) injection 40 mg (40 mg Intravenous Given 3/3/22 1229)   NIFEdipine (PROCARDIA XL) 24 hr tablet 30 mg (30 mg Oral Given 3/3/22 1228)   potassium chloride (K-DUR,KLOR-CON) CR tablet 40 mEq (40 mEq Oral Given 3/4/22 0841)   NIFEdipine (PROCARDIA XL) 24 hr tablet 30 mg (30 mg Oral Given 3/4/22 1417)       Diagnostic Studies  Results Reviewed     Procedure Component Value Units Date/Time    Blood culture #1 [[id number]] Collected: 03/02/22 0612    Lab Status: Preliminary result Specimen: Blood from Arm, Left Updated: 03/04/22 1001     Blood Culture No Growth at 48 hrs  Blood culture #2 [[id number]] Collected: 03/02/22 0547    Lab Status: Preliminary result Specimen: Blood from Arm, Left Updated: 03/04/22 1001     Blood Culture No Growth at 48 hrs      Comprehensive metabolic panel [[id number]]  (Abnormal) Collected: 03/04/22 0456    Lab Status: Final result Specimen: Blood from Arm, Left Updated: 03/04/22 0600     Sodium 139 mmol/L      Potassium 3 3 mmol/L      Chloride 101 mmol/L      CO2 27 mmol/L      ANION GAP 11 mmol/L      BUN 35 mg/dL      Creatinine 1 61 mg/dL      Glucose 134 mg/dL      Calcium 9 4 mg/dL      AST 19 U/L      ALT 26 U/L      Alkaline Phosphatase 62 U/L      Total Protein 7 5 g/dL      Albumin 3 6 g/dL      Total Bilirubin 1 96 mg/dL      eGFR 31 ml/min/1 73sq m     Narrative:      [employer] guidelines for Chronic Kidney Disease (CKD):     Stage 1 with normal or high GFR (GFR > 90 mL/min/1 73 square meters)    Stage 2 Mild CKD (GFR = 60-89 mL/min/1 73 square meters)    Stage 3A Moderate CKD (GFR = 45-59 mL/min/1 73 square meters)    Stage 3B Moderate CKD (GFR = 30-44 mL/min/1 73 square meters)    Stage 4 Severe CKD (GFR = 15-29 mL/min/1 73 square meters)    Stage 5 End Stage CKD (GFR <15 mL/min/1 73 square meters)  Note: GFR calculation is accurate only with a steady state creatinine    CBC and differential [[id number]]  (Abnormal) Collected: 03/04/22 0456    Lab Status: Final result Specimen: Blood from Arm, Left Updated: 03/04/22 0508     WBC 20 32 Thousand/uL      RBC 5 02 Million/uL      Hemoglobin 13 8 g/dL      Hematocrit 39 5 %      MCV 79 fL      MCH 27 5 pg      MCHC 34 9 g/dL      RDW 14 1 %      MPV 10 6 fL      Platelets 363 Thousands/uL      nRBC 0 /100 WBCs      Neutrophils Relative 80 %      Immat GRANS % 1 %      Lymphocytes Relative 12 %      Monocytes Relative 7 %      Eosinophils Relative 0 %      Basophils Relative 0 %      Neutrophils Absolute 16 16 Thousands/µL      Immature Grans Absolute 0 12 Thousand/uL      Lymphocytes Absolute 2 46 Thousands/µL      Monocytes Absolute 1 51 Thousand/µL      Eosinophils Absolute 0 00 Thousand/µL      Basophils Absolute 0 07 Thousands/µL     Procalcitonin with AM Reflex [[id number]]  (Normal) Collected: 03/03/22 1156    Lab Status: Final result Specimen: Blood from Arm, Left Updated: 03/03/22 1250 Procalcitonin 0 13 ng/ml     Fingerstick Glucose (POCT) [[id number]]  (Abnormal) Collected: 03/03/22 0821    Lab Status: Final result Updated: 03/03/22 0822     POC Glucose 147 mg/dl     Urine culture [[id number]] Collected: 03/02/22 0630    Lab Status: Final result Specimen: Urine, Other Updated: 03/03/22 0728     Urine Culture No Growth <1000 cfu/mL    Comprehensive metabolic panel [[id number]]  (Abnormal) Collected: 03/03/22 0551    Lab Status: Final result Specimen: Blood from Arm, Left Updated: 03/03/22 0636     Sodium 140 mmol/L      Potassium 3 5 mmol/L      Chloride 103 mmol/L      CO2 29 mmol/L      ANION GAP 8 mmol/L      BUN 25 mg/dL      Creatinine 1 43 mg/dL      Glucose 165 mg/dL      Calcium 9 4 mg/dL      AST 18 U/L      ALT 29 U/L      Alkaline Phosphatase 62 U/L      Total Protein 7 6 g/dL      Albumin 3 6 g/dL      Total Bilirubin 1 36 mg/dL      eGFR 36 ml/min/1 73sq m     Narrative:      [place] guidelines for Chronic Kidney Disease (CKD):     Stage 1 with normal or high GFR (GFR > 90 mL/min/1 73 square meters)    Stage 2 Mild CKD (GFR = 60-89 mL/min/1 73 square meters)    Stage 3A Moderate CKD (GFR = 45-59 mL/min/1 73 square meters)    Stage 3B Moderate CKD (GFR = 30-44 mL/min/1 73 square meters)    Stage 4 Severe CKD (GFR = 15-29 mL/min/1 73 square meters)    Stage 5 End Stage CKD (GFR <15 mL/min/1 73 square meters)  Note: GFR calculation is accurate only with a steady state creatinine    Fingerstick Glucose (POCT) [[id number]]  (Abnormal) Collected: 03/03/22 0626    Lab Status: Final result Updated: 03/03/22 0627     POC Glucose 154 mg/dl     CBC and differential [[id number]]  (Abnormal) Collected: 03/03/22 0551    Lab Status: Final result Specimen: Blood from Arm, Left Updated: 03/03/22 0611     WBC 17 20 Thousand/uL      RBC 4 94 Million/uL      Hemoglobin 13 8 g/dL      Hematocrit 39 7 %      MCV 80 fL      MCH 27 9 pg      MCHC 34 8 g/dL      RDW 14 3 % MPV 10 5 fL      Platelets 942 Thousands/uL      nRBC 0 /100 WBCs      Neutrophils Relative 80 %      Immat GRANS % 0 %      Lymphocytes Relative 14 %      Monocytes Relative 6 %      Eosinophils Relative 0 %      Basophils Relative 0 %      Neutrophils Absolute 13 58 Thousands/µL      Immature Grans Absolute 0 07 Thousand/uL      Lymphocytes Absolute 2 42 Thousands/µL      Monocytes Absolute 1 07 Thousand/µL      Eosinophils Absolute 0 00 Thousand/µL      Basophils Absolute 0 06 Thousands/µL     Hemoglobin A1C [[id number]]  (Abnormal) Collected: 03/02/22 0951    Lab Status: Final result Specimen: Blood from Arm, Left Updated: 03/03/22 0456     Hemoglobin A1C 9 6 %       mg/dl     Fingerstick Glucose (POCT) [[id number]]  (Abnormal) Collected: 03/03/22 0357    Lab Status: Final result Updated: 03/03/22 0405     POC Glucose 156 mg/dl     Basic metabolic panel [[id number]]  (Abnormal) Collected: 03/03/22 0145    Lab Status: Final result Specimen: Blood from Arm, Left Updated: 03/03/22 0310     Sodium 142 mmol/L      Potassium 3 2 mmol/L      Chloride 102 mmol/L      CO2 29 mmol/L      ANION GAP 11 mmol/L      BUN 23 mg/dL      Creatinine 1 43 mg/dL      Glucose 168 mg/dL      Calcium 9 3 mg/dL      eGFR 36 ml/min/1 73sq m     Narrative:      [facility] guidelines for Chronic Kidney Disease (CKD):     Stage 1 with normal or high GFR (GFR > 90 mL/min/1 73 square meters)    Stage 2 Mild CKD (GFR = 60-89 mL/min/1 73 square meters)    Stage 3A Moderate CKD (GFR = 45-59 mL/min/1 73 square meters)    Stage 3B Moderate CKD (GFR = 30-44 mL/min/1 73 square meters)    Stage 4 Severe CKD (GFR = 15-29 mL/min/1 73 square meters)    Stage 5 End Stage CKD (GFR <15 mL/min/1 73 square meters)  Note: GFR calculation is accurate only with a steady state creatinine    Fingerstick Glucose (POCT) [[id number]]  (Abnormal) Collected: 03/03/22 0131    Lab Status: Final result Updated: 03/03/22 0204     POC Glucose 169 mg/dl     Fingerstick Glucose (POCT) [[id number]]  (Abnormal) Collected: 03/02/22 2255    Lab Status: Final result Updated: 03/02/22 2256     POC Glucose 175 mg/dl     Basic metabolic panel [[id number]]  (Abnormal) Collected: 03/02/22 2011    Lab Status: Final result Specimen: Blood from Arm, Right Updated: 03/02/22 2038     Sodium 141 mmol/L      Potassium 3 4 mmol/L      Chloride 103 mmol/L      CO2 30 mmol/L      ANION GAP 8 mmol/L      BUN 21 mg/dL      Creatinine 1 50 mg/dL      Glucose 203 mg/dL      Calcium 9 5 mg/dL      eGFR 34 ml/min/1 73sq m     Narrative:      [facility] guidelines for Chronic Kidney Disease (CKD):     Stage 1 with normal or high GFR (GFR > 90 mL/min/1 73 square meters)    Stage 2 Mild CKD (GFR = 60-89 mL/min/1 73 square meters)    Stage 3A Moderate CKD (GFR = 45-59 mL/min/1 73 square meters)    Stage 3B Moderate CKD (GFR = 30-44 mL/min/1 73 square meters)    Stage 4 Severe CKD (GFR = 15-29 mL/min/1 73 square meters)    Stage 5 End Stage CKD (GFR <15 mL/min/1 73 square meters)  Note: GFR calculation is accurate only with a steady state creatinine    Fingerstick Glucose (POCT) [[id number]]  (Abnormal) Collected: 03/02/22 2015    Lab Status: Final result Updated: 03/02/22 2018     POC Glucose 203 mg/dl     Lactic acid 2 Hours [[id number]]  (Normal) Collected: 03/02/22 1806    Lab Status: Final result Specimen: Blood from Arm, Right Updated: 03/02/22 1854     LACTIC ACID 2 0 mmol/L     Narrative:      Result may be elevated if tourniquet was used during collection      Basic metabolic panel [[id number]]  (Abnormal) Collected: 03/02/22 1806    Lab Status: Final result Specimen: Blood from Arm, Right Updated: 03/02/22 1834     Sodium 141 mmol/L      Potassium 4 0 mmol/L      Chloride 100 mmol/L      CO2 28 mmol/L      ANION GAP 13 mmol/L      BUN 20 mg/dL      Creatinine 1 46 mg/dL      Glucose 229 mg/dL      Calcium 9 4 mg/dL      eGFR 35 ml/min/1 73sq m Narrative:      National Kid[employer] Disease Foundation guidelines for Chronic Kidney Disease (CKD):     Stage 1 with normal or high GFR (GFR > 90 mL/min/1 73 square meters)    Stage 2 Mild CKD (GFR = 60-89 mL/min/1 73 square meters)    Stage 3A Moderate CKD (GFR = 45-59 mL/min/1 73 square meters)    Stage 3B Moderate CKD (GFR = 30-44 mL/min/1 73 square meters)    Stage 4 Severe CKD (GFR = 15-29 mL/min/1 73 square meters)    Stage 5 End Stage CKD (GFR <15 mL/min/1 73 square meters)  Note: GFR calculation is accurate only with a steady state creatinine    Fingerstick Glucose (POCT) [[id number]]  (Abnormal) Collected: 03/02/22 1811    Lab Status: Final result Updated: 03/02/22 1813     POC Glucose 220 mg/dl     Lactic acid, plasma [[id number]]  (Abnormal) Collected: 03/02/22 1545    Lab Status: Final result Specimen: Blood from Arm, Right Updated: 03/02/22 1652     LACTIC ACID 3 1 mmol/L     Narrative:      Result may be elevated if tourniquet was used during collection  TSH, 3rd generation [[id number]]  (Normal) Collected: 03/02/22 1545    Lab Status: Final result Specimen: Blood from Arm, Right Updated: 03/02/22 1630     TSH 3RD GENERATON 1 496 uIU/mL     Narrative:      Patients undergoing fluorescein dye angiography may retain small amounts of fluorescein in the body for 48-72 hours post procedure  Samples containing fluorescein can produce falsely depressed TSH values  If the patient had this procedure,a specimen should be resubmitted post fluorescein clearance        Fingerstick Glucose (POCT) [[id number]]  (Abnormal) Collected: 03/02/22 1608    Lab Status: Final result Updated: 03/02/22 1609     POC Glucose 252 mg/dl     Fingerstick Glucose (POCT) [[id number]]  (Abnormal) Collected: 03/02/22 1356    Lab Status: Final result Updated: 03/02/22 1358     POC Glucose 364 mg/dl     Fingerstick Glucose (POCT) [[id number]]  (Abnormal) Collected: 03/02/22 1223    Lab Status: Final result Updated: 03/02/22 1224     POC Glucose 401 mg/dl     Fingerstick Glucose (POCT) [[id number]]  (Abnormal) Collected: 03/02/22 1131    Lab Status: Final result Updated: 03/02/22 1132     POC Glucose 440 mg/dl     Lactic acid 2 Hours [[id number]]  (Abnormal) Collected: 03/02/22 0951    Lab Status: Final result Specimen: Blood from Arm, Left Updated: 03/02/22 1105     LACTIC ACID 2 1 mmol/L     Narrative:      Result may be elevated if tourniquet was used during collection      HS Troponin I 2hr [[id number]]  (Normal) Collected: 03/02/22 0954    Lab Status: Final result Specimen: Blood from Arm, Left Updated: 03/02/22 1034     hs TnI 2hr 17 ng/L      Delta 2hr hsTnI 4 ng/L     Protime-INR [[id number]]  (Normal) Collected: 03/02/22 0951    Lab Status: Final result Specimen: Blood from Arm, Left Updated: 03/02/22 1022     Protime 13 4 seconds      INR 1 02    APTT [[id number]]  (Normal) Collected: 03/02/22 0951    Lab Status: Final result Specimen: Blood from Arm, Left Updated: 03/02/22 1022     PTT 24 seconds     NT-BNP PRO [[id number]]  (Abnormal) Collected: 03/02/22 0612    Lab Status: Final result Specimen: Blood from Arm, Left Updated: 03/02/22 0945     NT-proBNP 4,572 pg/mL     HS Troponin I 4hr [[id number]]  (Normal) Collected: 03/02/22 0808    Lab Status: Final result Specimen: Blood from Arm, Left Updated: 03/02/22 0853     hs TnI 4hr 16 ng/L      Delta 4hr hsTnI 3 ng/L     Ammonia [[id number]]  (Abnormal) Collected: 03/02/22 0710    Lab Status: Final result Specimen: Blood from Arm, Right Updated: 03/02/22 0810     Ammonia <10 umol/L     Beta Hydroxybutyrate [[id number]]  (Abnormal) Collected: 03/02/22 0710    Lab Status: Final result Specimen: Blood from Arm, Right Updated: 03/02/22 0727     BETA-HYDROXYBUTYRATE 0 8 mmol/L     Blood gas, venous [[id number]]  (Abnormal) Collected: 03/02/22 0710    Lab Status: Final result Specimen: Blood from Arm, Right Updated: 03/02/22 0719     pH, [place] 7 347     pCO2, [place] 52 0 mm Hg      pO2, [place] 36 4 mm Hg      HCO3, [place] 27 9 mmol/L      Base Excess, [place] 1 2 mmol/L      O2 Content, [place] 13 3 ml/dL      O2 HGB, VENOUS 64 5 %     Fingerstick Glucose (POCT) [[id number]]  (Abnormal) Collected: 03/02/22 0705    Lab Status: Final result Updated: 03/02/22 0711     POC Glucose 396 mg/dl     Comprehensive metabolic panel [[id number]]  (Abnormal) Collected: 03/02/22 0612    Lab Status: Final result Specimen: Blood from Arm, Left Updated: 03/02/22 0704     Sodium 135 mmol/L      Potassium 3 7 mmol/L      Chloride 96 mmol/L      CO2 25 mmol/L      ANION GAP 14 mmol/L      BUN 18 mg/dL      Creatinine 1 26 mg/dL      Glucose 417 mg/dL      Calcium 10 0 mg/dL      AST 26 U/L      ALT 35 U/L      Alkaline Phosphatase 72 U/L      Total Protein 8 4 g/dL      Albumin 4 1 g/dL      Total Bilirubin 2 01 mg/dL      eGFR 42 ml/min/1 73sq m     Narrative:      [facility] guidelines for Chronic Kidney Disease (CKD):     Stage 1 with normal or high GFR (GFR > 90 mL/min/1 73 square meters)    Stage 2 Mild CKD (GFR = 60-89 mL/min/1 73 square meters)    Stage 3A Moderate CKD (GFR = 45-59 mL/min/1 73 square meters)    Stage 3B Moderate CKD (GFR = 30-44 mL/min/1 73 square meters)    Stage 4 Severe CKD (GFR = 15-29 mL/min/1 73 square meters)    Stage 5 End Stage CKD (GFR <15 mL/min/1 73 square meters)  Note: GFR calculation is accurate only with a steady state creatinine    Lipase [[id number]]  (Normal) Collected: 03/02/22 0612    Lab Status: Final result Specimen: Blood from Arm, Left Updated: 03/02/22 0704     Lipase 185 u/L     Urine Microscopic [[id number]]  (Normal) Collected: 03/02/22 0630    Lab Status: Final result Specimen: Urine, Straight Cath Updated: 03/02/22 0656     RBC, UA 2-4 /hpf      WBC, UA 0-1 /hpf      Epithelial Cells Occasional /hpf      Bacteria, UA Occasional /hpf     UA (URINE) with reflex to Scope [[id number]]  (Abnormal) Collected: 03/02/22 0630    Lab Status: Final result Specimen: Urine, Straight Cath Updated: 03/02/22 0645     Color, UA Yellow     Clarity, UA Clear     Specific [place], UA 1 020     pH, UA 7 5     Leukocytes, UA Elevated glucose may cause decreased leukocyte values  See urine microscopic for [facility] result/     Nitrite, UA Negative     Protein, UA >=300 mg/dl      Glucose, UA >=1000 (1%) mg/dl      Ketones, UA 15 (1+) mg/dl      Urobilinogen, UA 0 2 E U /dl      Bilirubin, UA Negative     Blood, UA Small    Lactic acid, plasma [[id number]]  (Abnormal) Collected: 03/02/22 0547    Lab Status: Final result Specimen: Blood from Arm, Left Updated: 03/02/22 0644     LACTIC ACID 2 3 mmol/L     Narrative:      Result may be elevated if tourniquet was used during collection  COVID/FLU/RSV [[id number]]  (Normal) Collected: 03/02/22 0547    Lab Status: Final result Specimen: Nares from Nose Updated: 03/02/22 0640     SARS-CoV-2 Negative     INFLUENZA A PCR Negative     INFLUENZA B PCR Negative     RSV PCR Negative    Narrative:      FOR PEDIATRIC PATIENTS - copy/paste COVID Guidelines URL to browser: [email or web address]    SARS-CoV-2 assay is a Nucleic Acid Amplification assay intended for the  qualitative detection of nucleic acid from SARS-CoV-2 in nasopharyngeal  swabs  Results are for the presumptive identification of SARS-CoV-2 RNA  Positive results are indicative of infection with SARS-CoV-2, the virus  causing COVID-19, but do not rule out bacterial infection or co-infection  with other viruses  Laboratories within the [place] and its  territories are required to report all positive results to the appropriate  public health authorities  Negative results do not preclude SARS-CoV-2  infection and should not be used as the sole basis for treatment or other  patient management decisions  Negative results must be combined with  clinical observations, patient history, and epidemiological information    This test has not been FDA cleared or approved  This test has been authorized by FDA under an Emergency Use Authorization  (EUA)  This test is only authorized for the duration of time the  declaration that circumstances exist justifying the authorization of the  emergency use of an in vitro diagnostic tests for detection of SARS-CoV-2  virus and/or diagnosis of COVID-19 infection under section 564(b)(1) of  the Act, 21 U  [place] C  509TBP-1(G)(3), unless the authorization is terminated  or revoked sooner  The test has been validated but independent review by FDA  and CLIA is pending  Test performed using [employer] GeneXpert: This RT-PCR assay targets N2,  a region unique to SARS-CoV-2  A conserved region in the E-gene was chosen  for pan-Sarbecovirus detection which includes SARS-CoV-2      HS Troponin 0hr (reflex protocol) [[id number]]  (Normal) Collected: 03/02/22 0547    Lab Status: Final result Specimen: Blood from Arm, Left Updated: 03/02/22 0627     hs TnI 0hr 13 ng/L     CBC and differential [[id number]]  (Abnormal) Collected: 03/02/22 0547    Lab Status: Final result Specimen: Blood from Arm, Left Updated: 03/02/22 0613     WBC 14 84 Thousand/uL      RBC 5 46 Million/uL      Hemoglobin 15 1 g/dL      Hematocrit 43 2 %      MCV 79 fL      MCH 27 7 pg      MCHC 35 0 g/dL      RDW 13 8 %      MPV 10 8 fL      Platelets 627 Thousands/uL      nRBC 0 /100 WBCs      Neutrophils Relative 87 %      Immat GRANS % 1 %      Lymphocytes Relative 10 %      Monocytes Relative 2 %      Eosinophils Relative 0 %      Basophils Relative 0 %      Neutrophils Absolute 12 91 Thousands/µL      Immature Grans Absolute 0 13 Thousand/uL      Lymphocytes Absolute 1 44 Thousands/µL      Monocytes Absolute 0 30 Thousand/µL      Eosinophils Absolute 0 00 Thousand/µL      Basophils Absolute 0 06 Thousands/µL                  VAS renal artery complete   Final Result by Katarina Tristan DO (03/04 8940)      US right upper quadrant with liver dopplers   Final Result by Perla Christensen Harry Hunt MD (03/03 1054)      1  No acute abnormality  2   Mildly echogenic liver consistent with hepatic steatosis  3   Normal liver Dopplers  Workstation performed: [id number]         CT head without contrast   Final Result by Martin Westbrook MD (03/02 1138)      No acute intracranial abnormality  Workstation performed: [id number]         CTA dissection protocol chest/abdomen/pelvis   Final Result by Delfina Moctezuma MD (03/02 8827)      No acute pathology  No aortic aneurysm or dissection  Incidental 2-3 mm right upper lobe nodules  Based on current Fleischner Society 2017 Guidelines on incidental pulmonary nodule, no routine follow-up is needed if the patient is low risk  If the patient is high risk, optional follow-up chest CT at 12    months can be considered  Fatty liver  Colonic diverticulosis without diverticulitis  Workstation performed: [id number]         XR chest 1 view portable   Final Result by Jd Longo MD (03/02 7994)      No acute cardiopulmonary disease                    Workstation performed: [id number]                    MDM  Number of Diagnoses or Management Options  Abdominal pain: new and requires workup  Hyperglycemia: new and requires workup  Hypertensive emergency: new and requires workup  Lactic acidosis: new and requires workup  Leukocytosis: new and requires workup  Vomiting: new and requires workup     Amount and/or Complexity of Data Reviewed  Clinical lab tests: ordered and reviewed  Tests in the radiology section of CPT®: ordered and reviewed    Patient Progress  Patient progress: stable      Disposition  Final diagnoses:   Hypertensive emergency   Abdominal pain   Vomiting   Lactic acidosis   Leukocytosis   Hyperglycemia     Time reflects when diagnosis was documented in both MDM as applicable and the Disposition within this note     Time User Action Codes Description Comment    3/2/2022  8:17 AM Haile Hanson [I16 1] Hypertensive emergency     3/2/2022  8:17 AM Chrystie Thomas Add [R10 9] Abdominal pain     3/2/2022  8:17 AM Chrystie Thomas Add [R11 10] Vomiting     3/2/2022  8:17 AM Chrystie Thomas Add [E87 2] Lactic acidosis     3/2/2022  8:17 AM Chrystie Thomas Add [D72 829] Leukocytosis     3/2/2022  8:31 AM Chrystie Thomas Add [R73 9] Hyperglycemia     3/2/2022  1:18 PM Zenayun April Buys Add [E11 10] DKA (diabetic ketoacidosis) ([facility] )     3/3/2022  8:29 AM Zenayun Chen Buys Add [R65 10] SIRS (systemic inflammatory response syndrome) ([facility] )     3/3/2022  8:29 AM Zenayun April Ruanos Add [R10 9] Abdominal pain with nausea and vomiting       ED Disposition     ED Disposition Condition Date/Time Comment    Admit Stable Wed Mar 2, 2022 12:12 PM Case was discussed with Dr Adi Uriostegui and the patient's admission status was agreed to be Admission Status: inpatient status to the service of Dr Adi Uriostegui  Follow-up Information    None         Current Discharge Medication List      CONTINUE these medications which have NOT CHANGED    Details   carvedilol (COREG) 25 mg tablet Take 25 mg by mouth 2 (two) times a day with meals      cetirizine (ZyrTEC) 10 mg tablet Take 10 mg by mouth daily      glimepiride (AMARYL) 4 mg tablet Take 4 mg by mouth every morning before breakfast      meclizine (ANTIVERT) 25 mg tablet Take 25 mg by mouth every 12 (twelve) hours as needed for dizziness      metFORMIN (GLUCOPHAGE) 1000 MG tablet Take 1,000 mg by mouth 2 (two) times a day with meals      rivaroxaban (XARELTO) 20 mg tablet Take 20 mg by mouth      simvastatin (ZOCOR) 10 mg tablet Take 10 mg by mouth daily at bedtime      sitaGLIPtin (JANUVIA) 100 mg tablet Take 100 mg by mouth daily             No discharge procedures on file      PDMP Review     None          ED Provider  Electronically Signed by           Wilfrido Dasilva PA-C  03/04/22 9066

## 2022-03-05 NOTE — PROGRESS NOTES
NEPHROLOGY PROGRESS NOTE   Mary Anne Pierson 78 y o  female MRN: [id number]  Unit/Bed#: S -01 Encounter: [id number]    ASSESSMENT & PLAN:  61-year-old female with hypertension, diabetes mellitus type 2, schizophrenia presented with abdominal pain, vomiting, fatigue   Patient just recently moved from [address] [place] [address] Road years ago creatinine was near 1 with EGFR of 58  Nephrology consulted for worsening renal function and hypertensive emergency      Hypertensive emergency  -blood pressure on admission 270/133  -after multiple doses of IV labetalol blood pressure dropped to 116/55 within 14 hours but again trending up on 03/03   -was started on Coreg 25 mg twice daily on admission which appears to be the home medication  Receive lisinopril 10 mg on 03/03  Was started on nifedipine XL 60 mg daily on 03/03 and dose increased to 90 mg daily on 03/04  Was also started on chlorthalidone 12 5 mg daily on 03/04   -blood pressure currently at goal   Continue current medication, Coreg 25 mg b i d , nifedipine 90 mg daily, chlorthalidone 12 5 mg daily  Would consider switching nifedipine to ACE inhibitor or ARB as outpatient if renal function remains stable  - WORK UP: has persistent hypokalemia and in the setting of hypertension, CTA suggestive of 2 6 x 2 1 cm fat density left adrenal nodule compatible with myelolipoma, during workup for secondary cause of hypertension   -Check renin aldosterone and metanephrine, catecholamines which is currently under process   -random cortisol level was 35 3, dexamethasone suppression test per Endocrine  If test positive Endocrine recommended to start on spironolactone   -renal duplex-no renal artery stenosis      -She may benefit from addition of Aldactone but would wait for results of renin and aldosterone 1st   -continue IV labetalol as needed for systolic blood pressure more than 180s  -continue to monitor blood pressure, goal blood pressure goal is less than 130/80     Elevated creatinine/possible chronic kidney disease stage 3  -creatinine 1 26 on admission on 03/02   Previously in May 2016 creatinine was 1 0  -Did receive CTA on 03/02 which could be contributing   Also received lisinopril on 03/03  -CT with no hydronephrosis  -underlying CKD in the setting of hypertension and diabetes mellitus  -increase creatinine to 1 6 on 03/04 likely due to contrast nephropathy with CTA on 03/02 +addition of lisinopril on 03/03  currently creatinine stable at 1 6  Would continue to monitor renal function  Possible that new baseline is around mid 1  Continue to monitor renal function, continue to hold ACE inhibitor/ARB for now  -avoid hypotension and avoid nephrotoxins     Hypokalemia:  Replaced on 03/04 and currently potassium at normal range     Diabetes mellitus type 2with DKA:  Management per primary team and endocrinology         Complain of abdominal pain and nausea vomiting:    -GI recommendations   -normal abdominal ultrasound and CT scan  Suspected from viral gastroenteritis      Discussed with primary team      SUBJECTIVE:  No new complaints  No chest pain or SOB    OBJECTIVE:  Current Weight: Weight - Scale: 81 6 kg (180 lb)  Vitals:    03/05/22 0700   BP: 132/70   Pulse: 92   Resp: 18   Temp: 98 8 °F (37 1 °C)   SpO2: 94%     No intake or output data in the 24 hours ending 03/05/22 1029    Physical Exam  General:  Ill looking, awake  Eyes: Conjunctivae pink,  Sclera anicteric  ENT: lips and mucous membranes moist  Neck: supple   Chest: Clear to Auscultation both lungs,  no crackles, ronchus or wheezing  CVS: S1 & S2 present, normal rate, regular rhythm, no murmur    Abdomen: soft, non-tender, non-distended, Bowel sounds normoactive  Extremities: no edema of  legs  Skin: no rash  Neuro: awake, alert, oriented  Psych: Mood and affect appropriate     Medications:    Current Facility-Administered Medications:     aluminum-magnesium hydroxide-simethicone (MYLANTA) oral suspension 30 mL, 30 mL, Oral, Q4H PRN, Galina Melendrez PA-C, 30 mL at 03/03/22 0523    carvedilol (COREG) tablet 25 mg, 25 mg, Oral, BID With Meals, Melissa Martines PA-C, 25 mg at 03/05/22 7551    chlorthalidone tablet 12 5 mg, 12 5 mg, Oral, Daily, Telma Saucedo MD, 12 5 mg at 03/05/22 0811    famotidine (PEPCID) tablet 20 mg, 20 mg, Oral, BID, Melissa Martines PA-C, 20 mg at 03/05/22 9435    heparin (porcine) subcutaneous injection 5,000 Units, 5,000 Units, Subcutaneous, Q8H [facility], Galina Melendrez PA-C, 5,000 Units at 03/05/22 3643    HYDROmorphone (DILAUDID) injection 0 5 mg, 0 5 mg, Intravenous, Q4H PRN, Melissa Martines PA-C    insulin regular (HumuLIN R,NovoLIN R) 1 Units/mL in sodium chloride 0 9 % 100 mL infusion, 0 3-21 Units/hr, Intravenous, Titrated, Galina Melendrez PA-C, Last Rate: 5 mL/hr at 03/05/22 1022, 5 Units/hr at 03/05/22 1022    Labetalol HCl (NORMODYNE) injection 20 mg, 20 mg, Intravenous, Q2H PRN Max 3/day, Galina Melendrez PA-C, 20 mg at 03/03/22 0909    metoclopramide (REGLAN) injection 10 mg, 10 mg, Intravenous, Q6H PRN, Galina Melendrez PA-C, 10 mg at 03/03/22 0510    NIFEdipine (PROCARDIA XL) 24 hr tablet 90 mg, 90 mg, Oral, Daily, Telma Saucedo MD, 90 mg at 03/05/22 0811    ondansetron (ZOFRAN) injection 4 mg, 4 mg, Intravenous, Q4H PRN, Galina Melendrez PA-C, 4 mg at 03/04/22 1610    pantoprazole (PROTONIX) injection 40 mg, 40 mg, Intravenous, Q12H [facility], Galina Melendrez PA-C, 40 mg at 03/05/22 9325    pravastatin (PRAVACHOL) tablet 20 mg, 20 mg, Oral, Daily With Dinner, Galina Melendrez PA-C, 20 mg at 03/04/22 1602    sucralfate (CARAFATE) tablet 1 g, 1 g, Oral, Q6H [facility], Galina Melendrez PA-C, 1 g at 03/05/22 0619    Invasive Devices:        Lab Results:   Results from last 7 days   Lab Units 03/05/22  0624 03/04/22  0456 03/03/22  0551   WBC Thousand/uL 14 33* 20 32* 17 20*   HEMOGLOBIN g/dL 13 2 13 8 13 8   HEMATOCRIT % 38 3 39 5 39 7   PLATELETS Thousands/uL 265 281 288   POTASSIUM mmol/L 3 7 3 3* 3  5   CHLORIDE mmol/L 102 101 103   CO2 mmol/L 26 27 29   BUN mg/dL 44* 35* 25   CREATININE mg/dL 1 60* 1 61* 1 43*   CALCIUM mg/dL 9 5 9 4 9 4   MAGNESIUM mg/dL 2 3  --   --    ALK PHOS U/L 65 62 62   ALT U/L 22 26 29   AST U/L 14 19 18       Previous work up:         Portions of the record may have been created with voice recognition software  Occasional wrong word or "sound a like" substitutions may have occurred due to the inherent limitations of voice recognition software  Read the chart carefully and recognize, using context, where substitutions have occurred  If you have any questions, please contact the dictating provider

## 2022-03-05 NOTE — ASSESSMENT & PLAN NOTE
Lab Results   Component Value Date    HGBA1C 9 6 (H) 03/02/2022       Recent Labs     03/05/22  0618 03/05/22  0800 03/05/22  1002 03/05/22  1220   POCGLU 138 139 220* 221*       Blood Sugar Average: Last 72 hrs:  (P) 117 [id number]   · Patient presented with nausea, vomiting, epigastric pain and was found to have severe hyperglycemia  Mildly elevated anion gap and mildly elevated beta hydroxybutyrate  · Stopped oral hypoglycemic agents including Amaryl, metformin and Januvia  Reportedly issues with noncompliance  · Resolved    Endocrinology consulted, appreciate recommendations  · Transition from insulin gtt to basal/bolus regimen since nausea and vomiting has improved and she is on oral diet, will adjust as needed

## 2022-03-05 NOTE — ASSESSMENT & PLAN NOTE
· Profoundly elevated blood pressure on admission at 270/133, with associated nonspecific EKG changes  · BP now improving  · Continue home dose of Coreg 25 mg p o  BID, nifedipine 90 mg daily added as well as chlorthalidone 12 5 mg daily continue to hold Ace or Arb  · Secondary HTN work up in process  · Appreciate endocrinology assistance  Random cortisol level was 35 3 patient was to have dexamethasone suppression test yesterday evening however labs were not obtained this morning therefore will repeat dexamethasone suppression test tonight  Please obtain a m   Cortisol at 8:00 a m  on 3/6/22

## 2022-03-05 NOTE — ASSESSMENT & PLAN NOTE
· Nephrology following  Patient likely has element CKD stage 3    Increasing creatinine to 1 6 on 3/for likely from contrast nephropathy from CTA and addition of ACE-inhibitor  · Currently creatinine is stable  · Continue to monitor renal function  · Avoid nephrotoxic agents and hypotension  · Continue to hold ACE-inhibitor/our, can consider switching nifedipine to ACE-inhibitor or Arb after discharge if renal function remains stable

## 2022-03-05 NOTE — ASSESSMENT & PLAN NOTE
· Patient with leukocytosis, 14-->17-->20; now improving with WBC count of 14 33  Anticipate that her leukocytosis might worsen after today given that she received 2 doses of dexamethasone  Procalcitonin is down trending which is also reassuring suggesting non infectious process  · tachycardia and lactic acidosis present on admission resolved  · Blood cultures negative at 48 hours  · Urine Cx negative  · CT C/A/P on admission negative  · Continue to monitor off antibiotics    Monitor clinically

## 2022-03-05 NOTE — ASSESSMENT & PLAN NOTE
· Patient brought in for abdominal pain with nausea and vomiting, Polish speaking only and provided minimal history but noted to be frequently belching and moaning on admission  Noted to have central abdominal TTP  CTA C/A/P on admission was unremarkable aside from mild fatty liver  Lab work with elevated total bilirubin but normal LFTs, as well as SIRS criteria  Right upper quadrant ultrasound- biliary ductal dilatation  Patient is status post cholecystectomy  · Feeling a little better, no reports of vomiting by nursing staff overnight  Likely viral gastroenteritis  · Appreciate GI consult  · Continue PPI b i d  And antiemetics p r n   If patient has recurrence of symptoms, can consider endoscopic evaluation with EGD though will hold off for now

## 2022-03-05 NOTE — PROGRESS NOTES
[facility]  Progress Note Bairon Burgess 1945, 78 y o  female MRN: [id number]  Unit/Bed#: S -01 Encounter: [id number]  Primary Care Provider: Alex Romeo MD   Date and time admitted to hospital: 3/2/2022  5:21 AM    * Abdominal pain with nausea and vomiting  Assessment & Plan  · Patient brought in for abdominal pain with nausea and vomiting, Polish speaking only and provided minimal history but noted to be frequently belching and moaning on admission  Noted to have central abdominal TTP  CTA C/A/P on admission was unremarkable aside from mild fatty liver  Lab work with elevated total bilirubin but normal LFTs, as well as SIRS criteria  Right upper quadrant ultrasound- biliary ductal dilatation  Patient is status post cholecystectomy  · Feeling a little better, no reports of vomiting by nursing staff overnight  Likely viral gastroenteritis  · Appreciate GI consult  · Continue PPI b i d  And antiemetics p r n  If patient has recurrence of symptoms, can consider endoscopic evaluation with EGD though will hold off for now        Hypokalemia  Assessment & Plan  · Replete and follow    Delusional disorder [facility])  Assessment & Plan  · Per history provided by son  Patient has had previous inpatient psychiatric stay in [place]    GARFIELD (acute kidney injury) [facility])  Assessment & Plan  · Nephrology following  Patient likely has element CKD stage 3  Increasing creatinine to 1 6 on 3/for likely from contrast nephropathy from CTA and addition of ACE-inhibitor  · Currently creatinine is stable  · Continue to monitor renal function  · Avoid nephrotoxic agents and hypotension  · Continue to hold ACE-inhibitor/our, can consider switching nifedipine to ACE-inhibitor or Arb after discharge if renal function remains stable    SIRS (systemic inflammatory response syndrome) ([facility] [address] )  Assessment & Plan  · Patient with leukocytosis, 14-->17-->20; now improving with WBC count of 14 33  Anticipate that her leukocytosis might worsen after today given that she received 2 doses of dexamethasone  Procalcitonin is down trending which is also reassuring suggesting non infectious process  · tachycardia and lactic acidosis present on admission resolved  · Blood cultures negative at 48 hours  · Urine Cx negative  · CT C/A/P on admission negative  · Continue to monitor off antibiotics  Monitor clinically        PAF (paroxysmal atrial fibrillation) ([facility] )  Assessment & Plan  · On xarelto but initially,  indication was unclear  In conversation today using [employer] , patient tells me she has history of atrial fibrillation  · Will hold Xarelto temporarily in case need for any invasive intervention    DKA (diabetic ketoacidosis) ([facility] )  Assessment & Plan  Lab Results   Component Value Date    HGBA1C 9 6 (H) 03/02/2022       Recent Labs     03/05/22  0618 03/05/22  0800 03/05/22  1002 03/05/22  1220   POCGLU 138 139 220* 221*       Blood Sugar Average: Last 72 hrs:  (P) 907 [id number]   · Patient presented with nausea, vomiting, epigastric pain and was found to have severe hyperglycemia  Mildly elevated anion gap and mildly elevated beta hydroxybutyrate  · Stopped oral hypoglycemic agents including Amaryl, metformin and Januvia  Reportedly issues with noncompliance  · Resolved  Endocrinology consulted, appreciate recommendations  · Transition from insulin gtt to basal/bolus regimen since nausea and vomiting has improved and she is on oral diet, will adjust as needed    Hypertensive urgency  Assessment & Plan  · Profoundly elevated blood pressure on admission at 270/133, with associated nonspecific EKG changes  · BP now improving  · Continue home dose of Coreg 25 mg p o  BID, nifedipine 90 mg daily added as well as chlorthalidone 12 5 mg daily continue to hold Ace or Arb  · Secondary HTN work up in process  · Appreciate endocrinology assistance    Random cortisol level was 35 3 patient was to have dexamethasone suppression test yesterday evening however labs were not obtained this morning therefore will repeat dexamethasone suppression test tonight  Please obtain a m  Cortisol at 8:00 a m  on 3/6/22        VTE Pharmacologic Prophylaxis: VTE Score: 3 Moderate Risk (Score 3-4) - Pharmacological DVT Prophylaxis Ordered: heparin  Patient Centered Rounds: I performed bedside rounds with nursing staff today  Discussions with Specialists or Other Care Team Provider: GI and Endo notes reviewed    Education and Discussions with Family / Patient: Attempted to update  (son) via phone  Unable to contact  Time Spent for Care: 30 minutes  More than 50% of total time spent on counseling and coordination of care as described above  Current Length of Stay: 3 day(s)  Current Patient Status: Inpatient   Certification Statement: The patient will continue to require additional inpatient hospital stay due to additional medical work-up  Discharge Plan: Anticipate discharge in 24-48 hrs to home  Code Status: Level 1 - Full Code    Subjective:   No acute events overnight  Patient denies significant abdominal pain  No vomiting per nursing staff  Objective:     Vitals:   Temp (24hrs), Av 2 °F (37 3 °C), Min:98 8 °F (37 1 °C), Max:99 6 °F (37 6 °C)    Temp:  [98 8 °F (37 1 °C)-99 6 °F (37 6 °C)] 99 6 °F (37 6 °C)  HR:  [81-97] 81  Resp:  [17-18] 18  BP: (110-157)/(50-76) 139/63  SpO2:  [92 %-95 %] 94 %  There is no height or weight on file to calculate BMI  Input and Output Summary (last 24 hours): Intake/Output Summary (Last 24 hours) at 3/5/2022 1439  Last data filed at 3/5/2022 1000  Gross per 24 hour   Intake --   Output 200 ml   Net -200 ml       Physical Exam:   Physical Exam  Vitals reviewed  Constitutional:       General: She is not in acute distress  Appearance: She is obese  She is ill-appearing  She is not toxic-appearing or diaphoretic     Cardiovascular:      Rate and Rhythm: Normal rate  Rhythm irregular  Pulmonary:      Effort: No respiratory distress  Breath sounds: No wheezing or rhonchi  Abdominal:      General: There is no distension  Palpations: Abdomen is soft  There is no mass  Tenderness: There is no abdominal tenderness  There is no guarding  Musculoskeletal:         General: No swelling  Skin:     General: Skin is warm and dry  Neurological:      Mental Status: Mental status is at baseline            Additional Data:     Labs:  Results from last 7 days   Lab Units 03/05/22  0624   WBC Thousand/uL 14 33*   HEMOGLOBIN g/dL 13 2   HEMATOCRIT % 38 3   PLATELETS Thousands/uL 265   NEUTROS PCT % 82*   LYMPHS PCT % 11*   MONOS PCT % 6   EOS PCT % 0     Results from last 7 days   Lab Units 03/05/22  0624   SODIUM mmol/L 136   POTASSIUM mmol/L 3 7   CHLORIDE mmol/L 102   CO2 mmol/L 26   BUN mg/dL 44*   CREATININE mg/dL 1 60*   ANION GAP mmol/L 8   CALCIUM mg/dL 9 5   ALBUMIN g/dL 3 3*   TOTAL BILIRUBIN mg/dL 1 99*   ALK PHOS U/L 65   ALT U/L 22   AST U/L 14   GLUCOSE RANDOM mg/dL 139     Results from last 7 days   Lab Units 03/02/22  0951   INR  1 02     Results from last 7 days   Lab Units 03/05/22  1220 03/05/22  1002 03/05/22  0800 03/05/22  0618 03/05/22  0400 03/05/22  0208 03/05/22  0014 03/04/22  2149 03/04/22  1945 03/04/22  1754 03/04/22  1603 03/04/22  1423   POC GLUCOSE mg/dl 221* 220* 139 138 126 121 111 147* 167* 178* 164* 170*     Results from last 7 days   Lab Units 03/02/22  0951   HEMOGLOBIN A1C % 9 6*     Results from last 7 days   Lab Units 03/05/22  0624 03/04/22  0456 03/03/22  1156 03/02/22  1806 03/02/22  1545 03/02/22  0951 03/02/22  0547   LACTIC ACID mmol/L  --   --   --  2 0 3 1* 2 1* 2 3*   PROCALCITONIN ng/ml 0 23 0 22 0 13  --   --   --   --        Lines/Drains:  Invasive Devices  Report    Peripheral Intravenous Line            Peripheral IV 03/02/22 Left Antecubital 3 days    Peripheral IV 03/02/22 Right Antecubital 2 days                      Recent Cultures (last 7 days):   Results from last 7 days   Lab Units 03/02/22  0630 03/02/22  0612 03/02/22  0547   BLOOD CULTURE   --  No Growth at 72 hrs  No Growth at 72 hrs  URINE CULTURE  No Growth <1000 cfu/mL  --   --        Last 24 Hours Medication List:   Current Facility-Administered Medications   Medication Dose Route Frequency Provider Last Rate    aluminum-magnesium hydroxide-simethicone  30 mL Oral Q4H PRN Galina Melendrez PA-C      carvedilol  25 mg Oral BID With Meals Cindy Emery PA-C      [START ON 3/6/2022] chlorthalidone  12 5 mg Oral Daily Iliana Alcantara MD      dexamethasone  1 mg Oral Once Emi Cardozo MD      famotidine  20 mg Oral BID Cindy Emery PA-C      heparin (porcine)  5,000 Units Subcutaneous Q8H [address] Galina Tamayo PA-C      HYDROmorphone  0 5 mg Intravenous Q4H PRN Cindy Emery PA-C      [START ON 3/6/2022] insulin glargine  15 Units Subcutaneous HS Emi Cardozo MD      insulin glargine  15 Units Subcutaneous Once Emi Cardozo MD      insulin lispro  1-5 Units Subcutaneous 4x Daily (with meals and at bedtime) Emi Cardozo MD      insulin lispro  5 Units Subcutaneous TID With Meals Emi Cardozo MD      Labetalol HCl  20 mg Intravenous Q2H PRN Max 3/day Santiago Romero PA-C      metoclopramide  10 mg Intravenous Q6H PRN Galina Melendrez PA-C      NIFEdipine  90 mg Oral Daily Iliana Alcantara MD      ondansetron  4 mg Intravenous Q4H PRN Galina Melendrez PA-C      pantoprazole  40 mg Intravenous Q12H [address] Galina Melendrez PA-C      pravastatin  20 mg Oral Daily With [employer]      sucralfate  1 g Oral Q6H [address] Galina Melendrez PA-C          Today, Patient Was Seen By: Emi Cardozo MD    **Please Note: This note may have been constructed using a voice recognition system  **

## 2022-03-06 PROBLEM — K59.00 CONSTIPATION: Status: ACTIVE | Noted: 2022-03-06

## 2022-03-06 PROBLEM — R65.10 SIRS (SYSTEMIC INFLAMMATORY RESPONSE SYNDROME) (HCC): Status: RESOLVED | Noted: 2022-03-02 | Resolved: 2022-03-06

## 2022-03-06 PROBLEM — E87.6 HYPOKALEMIA: Status: RESOLVED | Noted: 2022-03-04 | Resolved: 2022-03-06

## 2022-03-06 LAB
ANION GAP SERPL CALCULATED.3IONS-SCNC: 11 MMOL/L (ref 4–13)
BASOPHILS # BLD AUTO: 0.02 THOUSANDS/ΜL (ref 0–0.1)
BASOPHILS NFR BLD AUTO: 0 % (ref 0–1)
BUN SERPL-MCNC: 42 MG/DL (ref 5–25)
CALCIUM SERPL-MCNC: 9.6 MG/DL (ref 8.3–10.1)
CHLORIDE SERPL-SCNC: 96 MMOL/L (ref 100–108)
CO2 SERPL-SCNC: 25 MMOL/L (ref 21–32)
CORTIS SERPL-MCNC: 4.7 UG/DL
CREAT SERPL-MCNC: 1.65 MG/DL (ref 0.6–1.3)
EOSINOPHIL # BLD AUTO: 0 THOUSAND/ΜL (ref 0–0.61)
EOSINOPHIL NFR BLD AUTO: 0 % (ref 0–6)
ERYTHROCYTE [DISTWIDTH] IN BLOOD BY AUTOMATED COUNT: 13.5 % (ref 11.6–15.1)
GFR SERPL CREATININE-BSD FRML MDRD: 30 ML/MIN/1.73SQ M
GLUCOSE SERPL-MCNC: 244 MG/DL (ref 65–140)
GLUCOSE SERPL-MCNC: 265 MG/DL (ref 65–140)
GLUCOSE SERPL-MCNC: 285 MG/DL (ref 65–140)
GLUCOSE SERPL-MCNC: 309 MG/DL (ref 65–140)
GLUCOSE SERPL-MCNC: 422 MG/DL (ref 65–140)
HCT VFR BLD AUTO: 38.9 % (ref 34.8–46.1)
HGB BLD-MCNC: 13 G/DL (ref 11.5–15.4)
IMM GRANULOCYTES # BLD AUTO: 0.06 THOUSAND/UL (ref 0–0.2)
IMM GRANULOCYTES NFR BLD AUTO: 1 % (ref 0–2)
LDH SERPL-CCNC: 272 U/L (ref 81–234)
LYMPHOCYTES # BLD AUTO: 1.28 THOUSANDS/ΜL (ref 0.6–4.47)
LYMPHOCYTES NFR BLD AUTO: 11 % (ref 14–44)
MCH RBC QN AUTO: 27.6 PG (ref 26.8–34.3)
MCHC RBC AUTO-ENTMCNC: 33.4 G/DL (ref 31.4–37.4)
MCV RBC AUTO: 83 FL (ref 82–98)
MONOCYTES # BLD AUTO: 0.58 THOUSAND/ΜL (ref 0.17–1.22)
MONOCYTES NFR BLD AUTO: 5 % (ref 4–12)
NEUTROPHILS # BLD AUTO: 10.03 THOUSANDS/ΜL (ref 1.85–7.62)
NEUTS SEG NFR BLD AUTO: 83 % (ref 43–75)
NRBC BLD AUTO-RTO: 0 /100 WBCS
PLATELET # BLD AUTO: 281 THOUSANDS/UL (ref 149–390)
PMV BLD AUTO: 10.9 FL (ref 8.9–12.7)
POTASSIUM SERPL-SCNC: 3.8 MMOL/L (ref 3.5–5.3)
RBC # BLD AUTO: 4.71 MILLION/UL (ref 3.81–5.12)
SODIUM SERPL-SCNC: 132 MMOL/L (ref 136–145)
WBC # BLD AUTO: 11.97 THOUSAND/UL (ref 4.31–10.16)

## 2022-03-06 PROCEDURE — 82533 TOTAL CORTISOL: CPT | Performed by: INTERNAL MEDICINE

## 2022-03-06 PROCEDURE — 99232 SBSQ HOSP IP/OBS MODERATE 35: CPT | Performed by: INTERNAL MEDICINE

## 2022-03-06 PROCEDURE — C9113 INJ PANTOPRAZOLE SODIUM, VIA: HCPCS | Performed by: PHYSICIAN ASSISTANT

## 2022-03-06 PROCEDURE — 82948 REAGENT STRIP/BLOOD GLUCOSE: CPT

## 2022-03-06 PROCEDURE — 85025 COMPLETE CBC W/AUTO DIFF WBC: CPT | Performed by: INTERNAL MEDICINE

## 2022-03-06 PROCEDURE — 80048 BASIC METABOLIC PNL TOTAL CA: CPT | Performed by: INTERNAL MEDICINE

## 2022-03-06 PROCEDURE — 83615 LACTATE (LD) (LDH) ENZYME: CPT | Performed by: INTERNAL MEDICINE

## 2022-03-06 RX ORDER — METOCLOPRAMIDE HYDROCHLORIDE 5 MG/ML
10 INJECTION INTRAMUSCULAR; INTRAVENOUS EVERY 6 HOURS PRN
Status: DISCONTINUED | OUTPATIENT
Start: 2022-03-06 | End: 2022-03-09 | Stop reason: HOSPADM

## 2022-03-06 RX ORDER — ONDANSETRON 4 MG/1
4 TABLET, ORALLY DISINTEGRATING ORAL EVERY 6 HOURS PRN
Status: DISCONTINUED | OUTPATIENT
Start: 2022-03-06 | End: 2022-03-09 | Stop reason: HOSPADM

## 2022-03-06 RX ORDER — POLYETHYLENE GLYCOL 3350 17 G/17G
17 POWDER, FOR SOLUTION ORAL DAILY
Status: DISCONTINUED | OUTPATIENT
Start: 2022-03-06 | End: 2022-03-09 | Stop reason: HOSPADM

## 2022-03-06 RX ORDER — SIMETHICONE 80 MG
80 TABLET,CHEWABLE ORAL EVERY 6 HOURS PRN
Status: DISCONTINUED | OUTPATIENT
Start: 2022-03-06 | End: 2022-03-09 | Stop reason: HOSPADM

## 2022-03-06 RX ORDER — INSULIN GLARGINE 100 [IU]/ML
20 INJECTION, SOLUTION SUBCUTANEOUS
Status: DISCONTINUED | OUTPATIENT
Start: 2022-03-06 | End: 2022-03-07

## 2022-03-06 RX ORDER — PANTOPRAZOLE SODIUM 40 MG/1
40 TABLET, DELAYED RELEASE ORAL
Status: DISCONTINUED | OUTPATIENT
Start: 2022-03-06 | End: 2022-03-09 | Stop reason: HOSPADM

## 2022-03-06 RX ORDER — AMOXICILLIN 250 MG
1 CAPSULE ORAL 2 TIMES DAILY
Status: DISCONTINUED | OUTPATIENT
Start: 2022-03-06 | End: 2022-03-07

## 2022-03-06 RX ADMIN — SUCRALFATE 1 G: 1 TABLET ORAL at 12:58

## 2022-03-06 RX ADMIN — SENNOSIDES AND DOCUSATE SODIUM 1 TABLET: 50; 8.6 TABLET ORAL at 11:22

## 2022-03-06 RX ADMIN — INSULIN LISPRO 3 UNITS: 100 INJECTION, SOLUTION INTRAVENOUS; SUBCUTANEOUS at 17:48

## 2022-03-06 RX ADMIN — CARVEDILOL 25 MG: 12.5 TABLET, FILM COATED ORAL at 08:19

## 2022-03-06 RX ADMIN — ONDANSETRON 4 MG: 4 TABLET, ORALLY DISINTEGRATING ORAL at 11:22

## 2022-03-06 RX ADMIN — INSULIN LISPRO 5 UNITS: 100 INJECTION, SOLUTION INTRAVENOUS; SUBCUTANEOUS at 11:23

## 2022-03-06 RX ADMIN — INSULIN LISPRO 2 UNITS: 100 INJECTION, SOLUTION INTRAVENOUS; SUBCUTANEOUS at 08:19

## 2022-03-06 RX ADMIN — CHLORTHALIDONE 12.5 MG: 25 TABLET ORAL at 11:21

## 2022-03-06 RX ADMIN — PANTOPRAZOLE SODIUM 40 MG: 40 INJECTION, POWDER, FOR SOLUTION INTRAVENOUS at 08:19

## 2022-03-06 RX ADMIN — NIFEDIPINE 90 MG: 30 TABLET, FILM COATED, EXTENDED RELEASE ORAL at 08:19

## 2022-03-06 RX ADMIN — HEPARIN SODIUM 5000 UNITS: 5000 INJECTION INTRAVENOUS; SUBCUTANEOUS at 05:26

## 2022-03-06 RX ADMIN — SENNOSIDES AND DOCUSATE SODIUM 1 TABLET: 50; 8.6 TABLET ORAL at 17:48

## 2022-03-06 RX ADMIN — FAMOTIDINE 20 MG: 20 TABLET ORAL at 08:19

## 2022-03-06 RX ADMIN — SUCRALFATE 1 G: 1 TABLET ORAL at 23:16

## 2022-03-06 RX ADMIN — CARVEDILOL 25 MG: 12.5 TABLET, FILM COATED ORAL at 17:51

## 2022-03-06 RX ADMIN — INSULIN HUMAN 8 UNITS: 100 INJECTION, SUSPENSION SUBCUTANEOUS at 15:24

## 2022-03-06 RX ADMIN — HEPARIN SODIUM 5000 UNITS: 5000 INJECTION INTRAVENOUS; SUBCUTANEOUS at 23:16

## 2022-03-06 RX ADMIN — INSULIN LISPRO 6 UNITS: 100 INJECTION, SOLUTION INTRAVENOUS; SUBCUTANEOUS at 12:51

## 2022-03-06 RX ADMIN — PRAVASTATIN SODIUM 20 MG: 20 TABLET ORAL at 17:52

## 2022-03-06 RX ADMIN — SUCRALFATE 1 G: 1 TABLET ORAL at 00:31

## 2022-03-06 RX ADMIN — INSULIN LISPRO 5 UNITS: 100 INJECTION, SOLUTION INTRAVENOUS; SUBCUTANEOUS at 08:20

## 2022-03-06 RX ADMIN — FAMOTIDINE 20 MG: 20 TABLET ORAL at 17:48

## 2022-03-06 RX ADMIN — INSULIN GLARGINE 20 UNITS: 100 INJECTION, SOLUTION SUBCUTANEOUS at 23:17

## 2022-03-06 RX ADMIN — POLYETHYLENE GLYCOL 3350 17 G: 17 POWDER, FOR SOLUTION ORAL at 11:20

## 2022-03-06 RX ADMIN — PANTOPRAZOLE SODIUM 40 MG: 40 TABLET, DELAYED RELEASE ORAL at 17:52

## 2022-03-06 RX ADMIN — SUCRALFATE 1 G: 1 TABLET ORAL at 05:26

## 2022-03-06 RX ADMIN — HEPARIN SODIUM 5000 UNITS: 5000 INJECTION INTRAVENOUS; SUBCUTANEOUS at 13:00

## 2022-03-06 RX ADMIN — SUCRALFATE 1 G: 1 TABLET ORAL at 17:48

## 2022-03-06 RX ADMIN — INSULIN LISPRO 3 UNITS: 100 INJECTION, SOLUTION INTRAVENOUS; SUBCUTANEOUS at 11:21

## 2022-03-06 RX ADMIN — INSULIN LISPRO 2 UNITS: 100 INJECTION, SOLUTION INTRAVENOUS; SUBCUTANEOUS at 23:16

## 2022-03-06 NOTE — PROGRESS NOTES
[facility]  Progress Note Tiffany Robles 1945, 78 y o  female MRN: [id number]  Unit/Bed#: S -01 Encounter: [id number]  Primary Care Provider: Rayna Sorto MD   Date and time admitted to hospital: 3/2/2022  5:21 AM    * Abdominal pain with nausea and vomiting  Assessment & Plan  · Patient brought in for abdominal pain with nausea and vomiting, Polish speaking only and provided minimal history but noted to be frequently belching and moaning on admission  Noted to have central abdominal TTP  CTA C/A/P on admission was unremarkable aside from mild fatty liver  Lab work with elevated total bilirubin but normal LFTs, as well as SIRS criteria  Right upper quadrant ultrasound- biliary ductal dilatation  Patient is status post cholecystectomy  · Patient was seen and evaluated by GI on 03/04, thought possibly viral gastroenteritis  Patient's symptoms were initially improving with supportive cares ie  IV fluids and antiemetics, however patient with worsening nausea and vomiting again and abdominal cramping/feels bloated and gassy per the patient  · Suspect there may be a component of diabetic gastroparesis, will ask GI to re-evaluate patient  · Change antiemetics to IV Reglan, will keep p r n  for now  · Blood sugars have been uncontrolled since transitioning off insulin gtt  Will need to adjust basal bolus regimen  Endocrine is following      Constipation  Assessment & Plan  · Start bowel regimen    Delusional disorder [facility])  Assessment & Plan  · Per history provided by son  Patient has had previous inpatient psychiatric stay in [place]    GARFIELD (acute kidney injury) [facility])  Assessment & Plan  · Nephrology following  Patient likely has element CKD stage 3    Increasing creatinine to 1 6 on 3/4 likely from contrast nephropathy from CTA and addition of ACE-inhibitor  · Currently creatinine is stable with s Cr 1 65, this may be new baseline  · Continue to monitor renal function  · Avoid nephrotoxic agents and hypotension  · Continue to hold ACE-inhibitor/our, can consider switching nifedipine to ACE-inhibitor or Arb after discharge if renal function remains stable    PAF (paroxysmal atrial fibrillation) ([facility] [address] )  Assessment & Plan  · On xarelto but initially,  indication was unclear  In conversation today using [employer] , patient tells me she has history of atrial fibrillation  · Will hold Xarelto temporarily in case need for any invasive intervention patient still with nausea    DKA (diabetic ketoacidosis) [facility])  Assessment & Plan  Lab Results   Component Value Date    HGBA1C 9 6 (H) 03/02/2022       Recent Labs     03/05/22  1550 03/05/22  2112 03/06/22  0815 03/06/22  1139   POCGLU 348* 259* 285* 422*       Blood Sugar Average: Last 72 hrs:  (P) 627 [id number]   · Patient presented with nausea, vomiting, epigastric pain and was found to have severe hyperglycemia  Mildly elevated anion gap and mildly elevated beta hydroxybutyrate  · Stopped oral hypoglycemic agents including Amaryl, metformin and Januvia  Reportedly issues with noncompliance  · Diabetic diet/light meals with nausea  · Resolved  Endocrinology consulted, appreciate recommendations  · Patient with blood glucose in the 300s and 400 since being transitioned off the insulin gtt and converted to basal/bolus regimen  Endocrinology is following  She will need adjustments to her regimen  Her anion gap and bicarb are normal    Hypertensive urgency  Assessment & Plan  · Profoundly elevated blood pressure on admission at 270/133, with associated nonspecific EKG changes  · BP now improving  Was elevated this morning though has now normalized this afternoon  · Continue home dose of Coreg 25 mg p o  BID, nifedipine 90 mg daily added as well as chlorthalidone 12 5 mg daily continue to hold Ace or Arb    No further adjustments at this time  · Secondary HTN work up in process  · Appreciate endocrinology assistance  Random cortisol level was 35 3 patient was to have dexamethasone suppression test yesterday evening however labs were not obtained this morning therefore will repeat dexamethasone suppression test tonight  Endocrinology following  Follow-up a m  Cortisol level      VTE Pharmacologic Prophylaxis: VTE Score: 3 Moderate Risk (Score 3-4) - Pharmacological DVT Prophylaxis Ordered: heparin  Patient Centered Rounds: I performed bedside rounds with nursing staff today  Discussions with Specialists or Other Care Team Provider:  GI    Education and Discussions with Family / Patient: Attempted to update  (son) via phone  Left voicemail  Attempted numerous times to call patient's son for medical update  Voicemail left to return call  Time Spent for Care: 30 minutes  More than 50% of total time spent on counseling and coordination of care as described above  Current Length of Stay: 4 day(s)  Current Patient Status: Inpatient   Certification Statement: The patient will continue to require additional inpatient hospital stay due to Medical management  Discharge Plan: Home with family when medically stable    Code Status: Level 1 - Full Code    Subjective:   Patient complains of nausea and episode of emesis  Also complains of some colicky gas pain  She was feeling better yesterday though does not feel well at all today  Otherwise afebrile  She has not had a bowel movement in the last couple a days  Objective:     Vitals:   Temp (24hrs), Av 8 °F (37 1 °C), Min:98 °F (36 7 °C), Max:99 7 °F (37 6 °C)    Temp:  [98 °F (36 7 °C)-99 7 °F (37 6 °C)] 98 °F (36 7 °C)  HR:  [77-89] 89  Resp:  [17-18] 18  BP: (115-185)/(58-85) 115/61  SpO2:  [91 %-95 %] 91 %  There is no height or weight on file to calculate BMI  Input and Output Summary (last 24 hours):      Intake/Output Summary (Last 24 hours) at 3/6/2022 1257  Last data filed at 3/6/2022 1250  Gross per 24 hour   Intake --   Output 1150 ml   Net -1150 ml       Physical Exam:   Physical Exam  Constitutional:       General: She is not in acute distress  Appearance: She is obese  She is ill-appearing  She is not toxic-appearing  Cardiovascular:      Rate and Rhythm: Normal rate and regular rhythm  Heart sounds: No murmur heard  Pulmonary:      Effort: No respiratory distress  Breath sounds: No wheezing  Abdominal:      General: There is no distension  Palpations: Abdomen is soft  Tenderness: There is no abdominal tenderness  There is no guarding  Comments: Hyperactive bowel sounds   Musculoskeletal:         General: No swelling  Skin:     General: Skin is warm and dry  Neurological:      Mental Status: She is alert  Mental status is at baseline  Additional Data:     Labs:  Results from last 7 days   Lab Units 03/06/22  0640   WBC Thousand/uL 11 97*   HEMOGLOBIN g/dL 13 0   HEMATOCRIT % 38 9   PLATELETS Thousands/uL 281   NEUTROS PCT % 83*   LYMPHS PCT % 11*   MONOS PCT % 5   EOS PCT % 0     Results from last 7 days   Lab Units 03/06/22  0640 03/05/22  0624 03/05/22  0624   SODIUM mmol/L 132*   < > 136   POTASSIUM mmol/L 3 8   < > 3 7   CHLORIDE mmol/L 96*   < > 102   CO2 mmol/L 25   < > 26   BUN mg/dL 42*   < > 44*   CREATININE mg/dL 1 65*   < > 1 60*   ANION GAP mmol/L 11   < > 8   CALCIUM mg/dL 9 6   < > 9 5   ALBUMIN g/dL  --   --  3 3*   TOTAL BILIRUBIN mg/dL  --   --  1 99*   ALK PHOS U/L  --   --  65   ALT U/L  --   --  22   AST U/L  --   --  14   GLUCOSE RANDOM mg/dL 309*   < > 139    < > = values in this interval not displayed       Results from last 7 days   Lab Units 03/02/22  0951   INR  1 02     Results from last 7 days   Lab Units 03/06/22  1139 03/06/22  0815 03/05/22  2112 03/05/22  1550 03/05/22  1220 03/05/22  1002 03/05/22  0800 03/05/22  0618 03/05/22  0400 03/05/22  0208 03/05/22  0014 03/04/22  2149   POC GLUCOSE mg/dl 422* 285* 259* 348* 221* 220* 139 138 126 121 111 147* Results from last 7 days   Lab Units 03/02/22  0951   HEMOGLOBIN A1C % 9 6*     Results from last 7 days   Lab Units 03/05/22  0624 03/04/22  0456 03/03/22  1156 03/02/22  1806 03/02/22  1545 03/02/22  0951 03/02/22  0547   LACTIC ACID mmol/L  --   --   --  2 0 3 1* 2 1* 2 3*   PROCALCITONIN ng/ml 0 23 0 22 0 13  --   --   --   --        Lines/Drains:  Invasive Devices  Report    Peripheral Intravenous Line            Peripheral IV 03/02/22 Left Antecubital 4 days    Peripheral IV 03/02/22 Right Antecubital 3 days                      Imaging: No pertinent imaging reviewed  Recent Cultures (last 7 days):   Results from last 7 days   Lab Units 03/02/22  0630 03/02/22  0612 03/02/22  0547   BLOOD CULTURE   --  No Growth After 4 Days  No Growth After 4 Days     URINE CULTURE  No Growth <1000 cfu/mL  --   --        Last 24 Hours Medication List:   Current Facility-Administered Medications   Medication Dose Route Frequency Provider Last Rate    carvedilol  25 mg Oral BID With Meals Melissa Martines PA-C      chlorthalidone  12 5 mg Oral Daily Telma Saucedo MD      famotidine  20 mg Oral BID Melissa Martines PA-C      heparin (porcine)  5,000 Units Subcutaneous Q8H [address] Galina Melendrez PA-C      insulin glargine  20 Units Subcutaneous HS Terri French MD      insulin lispro  1-5 Units Subcutaneous HS Sana Luque MD      insulin lispro  1-6 Units Subcutaneous TID AC Sana Luque MD      insulin lispro  7 Units Subcutaneous TID With Meals Sana Luque MD      insulin NPH  8 Units Subcutaneous Once Sana Luque MD      metoclopramide  10 mg Intravenous Q6H PRN Terri French MD      NIFEdipine  90 mg Oral Daily Telma Saucedo MD      ondansetron  4 mg Oral Q6H PRN Terri French MD      pantoprazole  40 mg Oral BID AC Terri French MD      polyethylene glycol  17 g Oral Daily Terri French MD      pravastatin  20 mg Oral Daily With [employer], [employer]      senna-docusate sodium  1 tablet Oral BID Emilee Bernardo MD      simethicone  80 mg Oral Q6H PRN Emilee Bernardo MD      sucralfate  1 g Oral Q6H [facility] Anil Jara PA-C          Today, Patient Was Seen By: Emilee Bernardo MD    **Please Note: This note may have been constructed using a voice recognition system  **

## 2022-03-06 NOTE — TREATMENT PLAN
Pt not seen or examined personally  Chart was reviewed  26-year-old polish female with type 2 diabetes mellitus, hypertension, HLD, 2 6cm Lt adrenal nodule and schizophrenia  She was admitted with abdominal pain/N/V and general weakness  She was found to have lactic acidosis, mild ketosis, GARFIELD and severe hyperglycemia at presentation  1  Type 2 diabetes with hyperglycemia  Complicated with steroid induced hyperglycemia post dexamethasone use  A1c was 9 6%  Home regimen was Amaryl 4mg qdaily, metformin 1000mg PO BID and [id number] mg po qd  She has hyperglycemia >200mg/dL range last 24 hrs  Basal bolus regimen was adjusted to Lantus 20u qhs and humalog 7u tidac plus correction algorithm 3  Last lantus was 3pm yesterday  Will use 1 dose NPH to cover till 10pm tonight  Continue to monitor capillary fingersticks and make changes as needed  Insulin need should be less after tomorrow after dexamethasone effect ceases  Usual half life is 40hrs  2  Lt adrenal nodule  She has a 2 6cm fat density adrenal nodule  Unclear whether this is a functional nodule  No previous imaging to compare as she emig[facility] months ago  Plasma metanephrines will likely be false positive given current stress from hospitalization and pain  Dexamethasone suppression test may also be falsely abnormal but will review both  Ok to use nicci blockers if there is concern for pheochromocytoma but suggest a 24 hr urine for catecholamines, nor-metanephrines and metanephrines  She will need close outpt follow up to assess functionality of nodule and definitive management  No contraindication to spironolactone use at this time  3  Abdominal pain  Imaging unremarkable  No pancreatitis  Note fatty liver  No transaminasemia  Note hx of cholecystectomy and no biliary stones  No hematemesis/jose rafael/diarrhea  Note neutrophilia but nml platelets  Low MCV  CTA abdomen did not show gross atherosclerosis  R/o ischemic pain  Check LDH  R/o gastritis/ gastroparesis

## 2022-03-06 NOTE — ASSESSMENT & PLAN NOTE
· Nephrology following  Patient likely has element CKD stage 3    Increasing creatinine to 1 6 on 3/4 likely from contrast nephropathy from CTA and addition of ACE-inhibitor  · Currently creatinine is stable with s Cr 1 65, this may be new baseline  · Continue to monitor renal function  · Avoid nephrotoxic agents and hypotension  · Continue to hold ACE-inhibitor/our, can consider switching nifedipine to ACE-inhibitor or Arb after discharge if renal function remains stable

## 2022-03-06 NOTE — ASSESSMENT & PLAN NOTE
Lab Results   Component Value Date    HGBA1C 9 6 (H) 03/02/2022       Recent Labs     03/05/22  1550 03/05/22  2112 03/06/22  0815 03/06/22  1139   POCGLU 348* 259* 285* 422*       Blood Sugar Average: Last 72 hrs:  (P) 522 [id number]   · Patient presented with nausea, vomiting, epigastric pain and was found to have severe hyperglycemia  Mildly elevated anion gap and mildly elevated beta hydroxybutyrate  · Stopped oral hypoglycemic agents including Amaryl, metformin and Januvia  Reportedly issues with noncompliance  · Diabetic diet/light meals with nausea  · Resolved  Endocrinology consulted, appreciate recommendations  · Patient with blood glucose in the 300s and 400 since being transitioned off the insulin gtt and converted to basal/bolus regimen  Endocrinology is following  She will need adjustments to her regimen    Her anion gap and bicarb are normal

## 2022-03-06 NOTE — ASSESSMENT & PLAN NOTE
· Profoundly elevated blood pressure on admission at 270/133, with associated nonspecific EKG changes  · BP now improving  Was elevated this morning though has now normalized this afternoon  · Continue home dose of Coreg 25 mg p o  BID, nifedipine 90 mg daily added as well as chlorthalidone 12 5 mg daily continue to hold Ace or Arb  No further adjustments at this time  · Secondary HTN work up in process  · Appreciate endocrinology assistance  Random cortisol level was 35 3 patient was to have dexamethasone suppression test yesterday evening however labs were not obtained this morning therefore will repeat dexamethasone suppression test tonight  Endocrinology following  Follow-up a m   Cortisol level

## 2022-03-06 NOTE — PROGRESS NOTES
NEPHROLOGY PROGRESS NOTE   Jennifer Arambula 78 y o  female MRN: [id number]  Unit/Bed#: S -01 Encounter: [id number]    ASSESSMENT & PLAN:  74-year-old female with hypertension, diabetes mellitus type 2, schizophrenia presented with abdominal pain, vomiting, fatigue   Patient just recently moved from [address] [place] [address] years ago creatinine was near 1 with EGFR of 58  Nephrology consulted for worsening renal function and hypertensive emergency      Hypertensive emergency  -blood pressure on admission 270/133  -after multiple doses of IV labetalol blood pressure dropped to 116/55 within 14 hours but again trending up on 03/03   -was started on Coreg 25 mg twice daily on admission which appears to be the home medication  Receive lisinopril 10 mg on 03/03  Was started on nifedipine XL 60 mg daily on 03/03 and dose increased to 90 mg daily on 03/04  Was also started on chlorthalidone 12 5 mg daily on 03/04   -blood pressure  elevated today morning but has been otherwise well controlled yesterday  Would continue to monitor for now without making any changes to the medications  -Continue current medication, Coreg 25 mg b i d , nifedipine 90 mg daily, chlorthalidone 12 5 mg daily  Would consider switching nifedipine to ACE inhibitor or ARB as outpatient if renal function remains stable  - WORK UP: has persistent hypokalemia and in the setting of hypertension, CTA suggestive of 2 6 x 2 1 cm fat density left adrenal nodule compatible with myelolipoma, doing workup for secondary cause of hypertension   -Check renin aldosterone and metanephrine, catecholamines which is currently under process   -random cortisol level was 35 3, dexamethasone suppression test per Endocrine  If test positive Endocrine recommended to start on spironolactone   -renal duplex-no renal artery stenosis      -She may benefit from addition of Aldactone but would wait for results of renin and aldosterone 1st incase she needs to go for AVS   -continue IV labetalol as needed for systolic blood pressure more than 180s  -continue to monitor blood pressure, goal blood pressure goal is less than 130/80     Elevated creatinine/possible chronic kidney disease stage 3  -creatinine 1 26 on admission on 03/02   Previously in May 2016 creatinine was 1 0  -Did receive CTA on 03/02 which could be contributing   Also received lisinopril on 03/03  -CT with no hydronephrosis  -underlying CKD in the setting of hypertension and diabetes mellitus  -increase creatinine to 1 6 on 03/04 and stable today at 1 6 likely due to contrast nephropathy with CTA on 03/02 +addition of lisinopril on 03/03  currently creatinine stable at 1 6  Would continue to monitor renal function  Possible that new baseline is around mid 1  Continue to monitor renal function, continue to hold ACE inhibitor/ARB for now  -avoid hypotension and avoid nephrotoxins     Hypokalemia:  Replaced on 03/04 and currently potassium at normal range  -F/u birgida renin level     Hyponatremia likely due to hyperglycemia, sodium was 132 with blood glucose 309, recommend better glucose control but defer to primary team and endocrinology     Diabetes mellitus type 2with DKA:  Management per primary team and endocrinology         Complain of abdominal pain and nausea vomiting:    -GI recommendations   -normal abdominal ultrasound and CT scan  Suspected from viral gastroenteritis      Discussed with primary team   Discussed with patient's son on the phone      SUBJECTIVE:  No new complaints    No chest pain or shortness of breath, no nausea vomiting    OBJECTIVE:  Current Weight: Weight - Scale: 81 6 kg (180 lb)  Vitals:    03/06/22 0700   BP: (!) 185/77   Pulse: 89   Resp: 18   Temp: 98 7 °F (37 1 °C)   SpO2: 94%       Intake/Output Summary (Last 24 hours) at 3/6/2022 0856  Last data filed at 3/6/2022 0524  Gross per 24 hour   Intake 120 ml   Output 850 ml   Net -730 ml       Physical Exam  General:  Ill looking, awake  Eyes: Conjunctivae pink,  Sclera anicteric  ENT: lips and mucous membranes moist  Neck: supple   Chest: Clear to Auscultation both lungs,  no crackles, ronchus or wheezing  CVS: S1 & S2 present, normal rate, regular rhythm, no murmur    Abdomen: soft, non-tender, non-distended, Bowel sounds normoactive  Extremities: no edema of  legs  Skin: no rash  Neuro: awake, alert, oriented x 3   Psych: Mood and affect appropriate     Medications:    Current Facility-Administered Medications:     aluminum-magnesium hydroxide-simethicone (MYLANTA) oral suspension 30 mL, 30 mL, Oral, Q4H PRN, Galina Melendrez PA-C, 30 mL at 03/03/22 0523    carvedilol (COREG) tablet 25 mg, 25 mg, Oral, BID With Meals, Ledy Ulrich PA-C, 25 mg at 03/06/22 2984    chlorthalidone tablet 12 5 mg, 12 5 mg, Oral, Daily, Jay Mack MD    famotidine (PEPCID) tablet 20 mg, 20 mg, Oral, BID, Ledy Ulrich PA-C, 20 mg at 03/06/22 0819    heparin (porcine) subcutaneous injection 5,000 Units, 5,000 Units, Subcutaneous, Q8H [address], Galina Melendrez PA-C, 5,000 Units at 03/06/22 0526    insulin glargine (LANTUS) subcutaneous injection 15 Units 0 15 mL, 15 Units, Subcutaneous, HS, Teresa Reed MD    insulin lispro (HumaLOG) 100 units/mL subcutaneous injection 1-5 Units, 1-5 Units, Subcutaneous, 4x Daily (with meals and at bedtime), 2 Units at 03/06/22 0819 **AND** Fingerstick Glucose (POCT), , , 4x Daily AC and at bedtime, Teresa Reed MD    insulin lispro (HumaLOG) 100 units/mL subcutaneous injection 5 Units, 5 Units, Subcutaneous, TID With Meals, Teresa Reed MD, 5 Units at 03/06/22 0820    NIFEdipine (PROCARDIA XL) 24 hr tablet 90 mg, 90 mg, Oral, Daily, Jay Mack MD, 90 mg at 03/06/22 0819    ondansetron (ZOFRAN-ODT) dispersible tablet 4 mg, 4 mg, Oral, Q6H PRNTeresa MD    pantoprazole (PROTONIX) EC tablet 40 mg, 40 mg, Oral, BID AC, Teresa Reed MD    polyethylene glycol (MIRALAX) packet 17 g, 17 g, Oral, Daily, Tung Ty MD    pravastatin (PRAVACHOL) tablet 20 mg, 20 mg, Oral, Daily With Dinner, Galina Melendrez PA-C, 20 mg at 03/05/22 1610    senna-docusate sodium (SENOKOT S) 8 6-50 mg per tablet 1 tablet, 1 tablet, Oral, BID, Tung Ty MD    sucralfate (CARAFATE) tablet 1 g, 1 g, Oral, Q6H ROM, Galina Melendrez PA-C, 1 g at 03/06/22 0526    Invasive Devices:        Lab Results:   Results from last 7 days   Lab Units 03/06/22  0640 03/05/22  0624 03/04/22  0456 03/03/22  0551 03/03/22  0551   WBC Thousand/uL 11 97* 14 33* 20 32*   < > 17 20*   HEMOGLOBIN g/dL 13 0 13 2 13 8   < > 13 8   HEMATOCRIT % 38 9 38 3 39 5   < > 39 7   PLATELETS Thousands/uL 281 265 281   < > 288   POTASSIUM mmol/L 3 8 3 7 3 3*   < > 3 5   CHLORIDE mmol/L 96* 102 101   < > 103   CO2 mmol/L 25 26 27   < > 29   BUN mg/dL 42* 44* 35*   < > 25   CREATININE mg/dL 1 65* 1 60* 1 61*   < > 1 43*   CALCIUM mg/dL 9 6 9 5 9 4   < > 9 4   MAGNESIUM mg/dL  --  2 3  --   --   --    ALK PHOS U/L  --  65 62  --  62   ALT U/L  --  22 26  --  29   AST U/L  --  14 19  --  18    < > = values in this interval not displayed  Previous work up:         Portions of the record may have been created with voice recognition software  Occasional wrong word or "sound a like" substitutions may have occurred due to the inherent limitations of voice recognition software  Read the chart carefully and recognize, using context, where substitutions have occurred  If you have any questions, please contact the dictating provider

## 2022-03-06 NOTE — ASSESSMENT & PLAN NOTE
· Patient brought in for abdominal pain with nausea and vomiting, Polish speaking only and provided minimal history but noted to be frequently belching and moaning on admission  Noted to have central abdominal TTP  CTA C/A/P on admission was unremarkable aside from mild fatty liver  Lab work with elevated total bilirubin but normal LFTs, as well as SIRS criteria  Right upper quadrant ultrasound- biliary ductal dilatation  Patient is status post cholecystectomy  · Patient was seen and evaluated by GI on 03/04, thought possibly viral gastroenteritis  Patient's symptoms were initially improving with supportive cares ie  IV fluids and antiemetics, however patient with worsening nausea and vomiting again and abdominal cramping/feels bloated and gassy per the patient  · Suspect there may be a component of diabetic gastroparesis, will ask GI to re-evaluate patient  · Change antiemetics to IV Reglan, will keep p r n  for now  · Blood sugars have been uncontrolled since transitioning off insulin gtt  Will need to adjust basal bolus regimen    Endocrine is following

## 2022-03-06 NOTE — ASSESSMENT & PLAN NOTE
· On xarelto but initially,  indication was unclear    In conversation today using Catherine , patient tells me she has history of atrial fibrillation  · Will hold Xarelto temporarily in case need for any invasive intervention patient still with nausea

## 2022-03-07 ENCOUNTER — ANESTHESIA EVENT (INPATIENT)
Dept: GASTROENTEROLOGY | Facility: HOSPITAL | Age: 77
DRG: 391 | End: 2022-03-07
Payer: MEDICARE

## 2022-03-07 ENCOUNTER — ANESTHESIA (INPATIENT)
Dept: GASTROENTEROLOGY | Facility: HOSPITAL | Age: 77
DRG: 391 | End: 2022-03-07
Payer: MEDICARE

## 2022-03-07 ENCOUNTER — APPOINTMENT (INPATIENT)
Dept: RADIOLOGY | Facility: HOSPITAL | Age: 77
DRG: 391 | End: 2022-03-07
Payer: MEDICARE

## 2022-03-07 ENCOUNTER — APPOINTMENT (INPATIENT)
Dept: GASTROENTEROLOGY | Facility: HOSPITAL | Age: 77
DRG: 391 | End: 2022-03-07
Payer: MEDICARE

## 2022-03-07 PROBLEM — R09.02 HYPOXIA: Status: ACTIVE | Noted: 2022-03-07

## 2022-03-07 LAB
ANION GAP SERPL CALCULATED.3IONS-SCNC: 8 MMOL/L (ref 4–13)
BACTERIA BLD CULT: NORMAL
BACTERIA BLD CULT: NORMAL
BASOPHILS # BLD AUTO: 0.05 THOUSANDS/ΜL (ref 0–0.1)
BASOPHILS NFR BLD AUTO: 1 % (ref 0–1)
BUN SERPL-MCNC: 30 MG/DL (ref 5–25)
CALCIUM SERPL-MCNC: 8.9 MG/DL (ref 8.3–10.1)
CHLORIDE SERPL-SCNC: 103 MMOL/L (ref 100–108)
CO2 SERPL-SCNC: 30 MMOL/L (ref 21–32)
CREAT SERPL-MCNC: 1.41 MG/DL (ref 0.6–1.3)
CREAT UR-MCNC: 62 MG/DL
EOSINOPHIL # BLD AUTO: 0.15 THOUSAND/ΜL (ref 0–0.61)
EOSINOPHIL NFR BLD AUTO: 1 % (ref 0–6)
ERYTHROCYTE [DISTWIDTH] IN BLOOD BY AUTOMATED COUNT: 13.4 % (ref 11.6–15.1)
GFR SERPL CREATININE-BSD FRML MDRD: 36 ML/MIN/1.73SQ M
GLUCOSE SERPL-MCNC: 158 MG/DL (ref 65–140)
GLUCOSE SERPL-MCNC: 160 MG/DL (ref 65–140)
GLUCOSE SERPL-MCNC: 204 MG/DL (ref 65–140)
GLUCOSE SERPL-MCNC: 252 MG/DL (ref 65–140)
GLUCOSE SERPL-MCNC: 298 MG/DL (ref 65–140)
HCT VFR BLD AUTO: 36.4 % (ref 34.8–46.1)
HGB BLD-MCNC: 12.1 G/DL (ref 11.5–15.4)
IMM GRANULOCYTES # BLD AUTO: 0.05 THOUSAND/UL (ref 0–0.2)
IMM GRANULOCYTES NFR BLD AUTO: 1 % (ref 0–2)
LYMPHOCYTES # BLD AUTO: 3.37 THOUSANDS/ΜL (ref 0.6–4.47)
LYMPHOCYTES NFR BLD AUTO: 32 % (ref 14–44)
MCH RBC QN AUTO: 27.6 PG (ref 26.8–34.3)
MCHC RBC AUTO-ENTMCNC: 33.2 G/DL (ref 31.4–37.4)
MCV RBC AUTO: 83 FL (ref 82–98)
MONOCYTES # BLD AUTO: 0.9 THOUSAND/ΜL (ref 0.17–1.22)
MONOCYTES NFR BLD AUTO: 8 % (ref 4–12)
NEUTROPHILS # BLD AUTO: 6.16 THOUSANDS/ΜL (ref 1.85–7.62)
NEUTS SEG NFR BLD AUTO: 57 % (ref 43–75)
NRBC BLD AUTO-RTO: 0 /100 WBCS
PLATELET # BLD AUTO: 299 THOUSANDS/UL (ref 149–390)
PMV BLD AUTO: 10.6 FL (ref 8.9–12.7)
POTASSIUM SERPL-SCNC: 3.4 MMOL/L (ref 3.5–5.3)
PROT UR-MCNC: 19 MG/DL
PROT/CREAT UR: 0.31 MG/G{CREAT} (ref 0–0.1)
RBC # BLD AUTO: 4.38 MILLION/UL (ref 3.81–5.12)
SODIUM SERPL-SCNC: 141 MMOL/L (ref 136–145)
WBC # BLD AUTO: 10.68 THOUSAND/UL (ref 4.31–10.16)

## 2022-03-07 PROCEDURE — 88342 IMHCHEM/IMCYTCHM 1ST ANTB: CPT | Performed by: PATHOLOGY

## 2022-03-07 PROCEDURE — 99232 SBSQ HOSP IP/OBS MODERATE 35: CPT | Performed by: INTERNAL MEDICINE

## 2022-03-07 PROCEDURE — 82948 REAGENT STRIP/BLOOD GLUCOSE: CPT

## 2022-03-07 PROCEDURE — 84156 ASSAY OF PROTEIN URINE: CPT | Performed by: INTERNAL MEDICINE

## 2022-03-07 PROCEDURE — 88305 TISSUE EXAM BY PATHOLOGIST: CPT | Performed by: PATHOLOGY

## 2022-03-07 PROCEDURE — 0DB98ZX EXCISION OF DUODENUM, VIA NATURAL OR ARTIFICIAL OPENING ENDOSCOPIC, DIAGNOSTIC: ICD-10-PCS | Performed by: INTERNAL MEDICINE

## 2022-03-07 PROCEDURE — 99232 SBSQ HOSP IP/OBS MODERATE 35: CPT | Performed by: PHYSICIAN ASSISTANT

## 2022-03-07 PROCEDURE — 71045 X-RAY EXAM CHEST 1 VIEW: CPT

## 2022-03-07 PROCEDURE — 80048 BASIC METABOLIC PNL TOTAL CA: CPT | Performed by: INTERNAL MEDICINE

## 2022-03-07 PROCEDURE — 82570 ASSAY OF URINE CREATININE: CPT | Performed by: INTERNAL MEDICINE

## 2022-03-07 PROCEDURE — 85025 COMPLETE CBC W/AUTO DIFF WBC: CPT | Performed by: INTERNAL MEDICINE

## 2022-03-07 PROCEDURE — 43239 EGD BIOPSY SINGLE/MULTIPLE: CPT | Performed by: INTERNAL MEDICINE

## 2022-03-07 RX ORDER — METOCLOPRAMIDE 5 MG/1
5 TABLET ORAL
Status: DISCONTINUED | OUTPATIENT
Start: 2022-03-07 | End: 2022-03-09 | Stop reason: HOSPADM

## 2022-03-07 RX ORDER — POTASSIUM CHLORIDE 20 MEQ/1
40 TABLET, EXTENDED RELEASE ORAL ONCE
Status: COMPLETED | OUTPATIENT
Start: 2022-03-07 | End: 2022-03-07

## 2022-03-07 RX ORDER — PROPOFOL 10 MG/ML
INJECTION, EMULSION INTRAVENOUS AS NEEDED
Status: DISCONTINUED | OUTPATIENT
Start: 2022-03-07 | End: 2022-03-07

## 2022-03-07 RX ORDER — LIDOCAINE HYDROCHLORIDE 10 MG/ML
INJECTION, SOLUTION EPIDURAL; INFILTRATION; INTRACAUDAL; PERINEURAL AS NEEDED
Status: DISCONTINUED | OUTPATIENT
Start: 2022-03-07 | End: 2022-03-07

## 2022-03-07 RX ORDER — SODIUM CHLORIDE 9 MG/ML
INJECTION, SOLUTION INTRAVENOUS CONTINUOUS PRN
Status: DISCONTINUED | OUTPATIENT
Start: 2022-03-07 | End: 2022-03-07

## 2022-03-07 RX ORDER — INSULIN GLARGINE 100 [IU]/ML
24 INJECTION, SOLUTION SUBCUTANEOUS
Status: DISCONTINUED | OUTPATIENT
Start: 2022-03-07 | End: 2022-03-09 | Stop reason: HOSPADM

## 2022-03-07 RX ORDER — AMOXICILLIN 250 MG
2 CAPSULE ORAL 2 TIMES DAILY
Status: DISCONTINUED | OUTPATIENT
Start: 2022-03-07 | End: 2022-03-09 | Stop reason: HOSPADM

## 2022-03-07 RX ORDER — PROPOFOL 10 MG/ML
INJECTION, EMULSION INTRAVENOUS CONTINUOUS PRN
Status: DISCONTINUED | OUTPATIENT
Start: 2022-03-07 | End: 2022-03-07

## 2022-03-07 RX ORDER — BISACODYL 10 MG
10 SUPPOSITORY, RECTAL RECTAL DAILY PRN
Status: DISCONTINUED | OUTPATIENT
Start: 2022-03-07 | End: 2022-03-09 | Stop reason: HOSPADM

## 2022-03-07 RX ORDER — LISINOPRIL 10 MG/1
10 TABLET ORAL DAILY
Status: DISCONTINUED | OUTPATIENT
Start: 2022-03-07 | End: 2022-03-08

## 2022-03-07 RX ADMIN — PROPOFOL 60 MG: 10 INJECTION, EMULSION INTRAVENOUS at 09:03

## 2022-03-07 RX ADMIN — RIVAROXABAN 20 MG: 20 TABLET, FILM COATED ORAL at 11:34

## 2022-03-07 RX ADMIN — SODIUM CHLORIDE: 9 INJECTION, SOLUTION INTRAVENOUS at 08:59

## 2022-03-07 RX ADMIN — INSULIN LISPRO 2 UNITS: 100 INJECTION, SOLUTION INTRAVENOUS; SUBCUTANEOUS at 21:18

## 2022-03-07 RX ADMIN — SUCRALFATE 1 G: 1 TABLET ORAL at 11:34

## 2022-03-07 RX ADMIN — LISINOPRIL 10 MG: 10 TABLET ORAL at 11:37

## 2022-03-07 RX ADMIN — PROPOFOL 20 MG: 10 INJECTION, EMULSION INTRAVENOUS at 09:06

## 2022-03-07 RX ADMIN — CARVEDILOL 25 MG: 12.5 TABLET, FILM COATED ORAL at 16:10

## 2022-03-07 RX ADMIN — PROPOFOL 50 MCG/KG/MIN: 10 INJECTION, EMULSION INTRAVENOUS at 09:03

## 2022-03-07 RX ADMIN — INSULIN GLARGINE 24 UNITS: 100 INJECTION, SOLUTION SUBCUTANEOUS at 21:21

## 2022-03-07 RX ADMIN — INSULIN LISPRO 4 UNITS: 100 INJECTION, SOLUTION INTRAVENOUS; SUBCUTANEOUS at 11:36

## 2022-03-07 RX ADMIN — PANTOPRAZOLE SODIUM 40 MG: 40 TABLET, DELAYED RELEASE ORAL at 16:09

## 2022-03-07 RX ADMIN — PRAVASTATIN SODIUM 20 MG: 20 TABLET ORAL at 16:09

## 2022-03-07 RX ADMIN — METOCLOPRAMIDE 5 MG: 5 TABLET ORAL at 16:10

## 2022-03-07 RX ADMIN — METOCLOPRAMIDE 5 MG: 5 TABLET ORAL at 11:34

## 2022-03-07 RX ADMIN — POTASSIUM CHLORIDE 40 MEQ: 1500 TABLET, EXTENDED RELEASE ORAL at 11:34

## 2022-03-07 RX ADMIN — SENNOSIDES AND DOCUSATE SODIUM 2 TABLET: 50; 8.6 TABLET ORAL at 18:12

## 2022-03-07 RX ADMIN — INSULIN LISPRO 2 UNITS: 100 INJECTION, SOLUTION INTRAVENOUS; SUBCUTANEOUS at 18:12

## 2022-03-07 RX ADMIN — LIDOCAINE HYDROCHLORIDE 50 MG: 10 INJECTION, SOLUTION EPIDURAL; INFILTRATION; INTRACAUDAL at 09:03

## 2022-03-07 RX ADMIN — FAMOTIDINE 20 MG: 20 TABLET ORAL at 18:12

## 2022-03-07 RX ADMIN — SUCRALFATE 1 G: 1 TABLET ORAL at 18:14

## 2022-03-07 NOTE — PROGRESS NOTES
Progress Note - Saint Bucy 78 y o  female MRN: [id number]    Unit/Bed#: S -01 Encounter: [id number]      CC: Diabetes control    Subjective:   Saint Bucy is a 78y o  year old female with type 2 diabetes mellitus with hyperglycemia complains today of stomach discomfort but denies nausuea/vomiting today  Patient was NPO today due to EGD procedure  EGD remarkable for mild gastritis and single diverticulum in the 2nd portion of the duodenum  Objective:     Vitals: Blood pressure 170/80, pulse 80, temperature 98 3 °F (36 8 °C), temperature source Oral, resp  rate 18, weight 81 6 kg (180 lb), SpO2 91 %  ,There is no height or weight on file to calculate BMI  Physical Exam:  General Appearance: awake, appears stated age and cooperative  Head: Normocephalic, without obvious abnormality, atraumatic  Extremities: moves all extremities  Skin: Skin color and temperature normal    Pulm: no labored breathing  Abdomen: non-tender on palpation    Lab, Imaging and other studies: I have personally reviewed pertinent reports  Assessment:  Lab Results   Component Value Date    POCGLU 204 (H) 03/07/2022    POCGLU 298 (H) 03/07/2022    POCGLU 160 (H) 03/07/2022    POCGLU 265 (H) 03/06/2022     Patient with blood glucose above 300s likely in the setting of dexamethazone administration  Patient currently basal-bolus: insuline Glargine 20 U and lispro 7 U, 3 x before meals  Plan: Will increase the long acting insulin to 25 U and will continue with short acting 7 un       2 Hypertensive emergency:   Concern  for Cushing syndrome  · CT abdomen revealed 2 6 x 2 1 cm fat density left adrenal nodule compatible with myelolipoma  · Night cortisol elevated  ·  Dexamethasone suppression test done yesterday with morning cortisol elevation  · Will order 24 Urine free cortisol and metanephrines    Plan:  Follow up on 24 urine free cortisol and metanephrines   Blood pressure control, follow up on nephrology recommendation    3  Abdominal pain/Nausea/Vomiting  · Patient admitted with acute abdominal pain, nausea, vomiting  Images unremarkable for acute inflammation of the pancreas, no biliary stones,   · EGD mild gastritis  · Concern for gastroparesis secondarily to diabetes less likely but not completely ruled out  Can be considered gastric emptying study in the outpatient setting    Portions of the record may have been created with voice recognition software  Occasional wrong word or "sound a like" substitutions may have occurred due to the inherent limitations of voice recognition software  Read the chart carefully and recognize, using context, where substitutions have occurred

## 2022-03-07 NOTE — ASSESSMENT & PLAN NOTE
· On xarelto but initially indication was unclear    In conversation using [employer] , patient reported history of atrial fibrillation  · We held Xarelto temporarily in case need for any invasive interventions, but she was not noted to have any events of bleeding and per my conversation with GI today we can resume Xarelto

## 2022-03-07 NOTE — QUICK NOTE
Patient had worsening symptoms of nausea and vomiting yesterday postprandially therefore an EGD was a schedule for today  Patient has not had any episodes of vomiting today  She also reports mild epigastric discomfort  No reports of hematemesis  Patient also reports her last bowel movements was 5 days ago however she has been passing gas  Will plan for EGD to assess for peptic ulcer disease/rule out gastric outlet obstruction  Given her poorly controlled diabetes, suspect may have underlying gastroparesis  Informed consent obtained from the patient using [employer]

## 2022-03-07 NOTE — ASSESSMENT & PLAN NOTE
· Mild hypoxia noted in the past 24 hours with room air sat 87-88% Currently 91 percent on 2 liters  Not offering any complaints of shortness of breath  · Check chest x-ray now  · Encourage more mobility and incentive spirometry

## 2022-03-07 NOTE — ASSESSMENT & PLAN NOTE
· Patient brought in for abdominal pain with nausea and vomiting, Polish speaking only and provided minimal history but noted to be frequently belching and moaning on admission  Noted to have central abdominal TTP  CTA C/A/P on admission was unremarkable aside from mild fatty liver  Lab work with elevated total bilirubin but normal LFTs, as well as SIRS criteria  Right upper quadrant ultrasound- biliary ductal dilatation  Patient is status post cholecystectomy  · Patient was seen and evaluated by GI on 03/04, thought possibly viral gastroenteritis  Patient's symptoms were initially improving with supportive cares ie  IV fluids and antiemetics, however patient with worsening nausea and vomiting again over weekend and abdominal cramping/feeling bloated and gassy per the patient      · Suspect there may be a component of diabetic gastroparesis, will need outpatient gastric emptying study  · EGD performed today only showed mild gastritis and biopsies were taken to rule out H pylori  · Will try around the clock low-dose Reglan before meals and continue Protonix and Carafate  · Will try again to advance diet

## 2022-03-07 NOTE — PLAN OF CARE
Problem: Potential for Falls  Goal: Patient will remain free of falls  Description: INTERVENTIONS:  - Educate patient/family on patient safety including physical limitations  - Instruct patient to call for assistance with activity   - Consult OT/PT to assist with strengthening/mobility   - Keep Call bell within reach  - Keep bed low and locked with side rails adjusted as appropriate  - Keep care items and personal belongings within reach  - Initiate and maintain comfort rounds  - Make Fall Risk Sign visible to staff  - Offer Toileting every 2 Hours, in advance of need  - Initiate/Maintain bed and chair alarm  - Obtain necessary fall risk management equipment  - Apply yellow socks and bracelet for high fall risk patients  - Consider moving patient to room near nurses station  3/7/2022 0427 by Srikanth Ledbetter RN  Outcome: Progressing  3/7/2022 0426 by Srikanth Ledbetter RN  Outcome: Progressing     Problem: MOBILITY - ADULT  Goal: Maintain or return to baseline ADL function  Description: INTERVENTIONS:  -  Assess patient's ability to carry out ADLs; assess patient's baseline for ADL function and identify physical deficits which impact ability to perform ADLs (bathing, care of mouth/teeth, toileting, grooming, dressing, etc )  - Assess/evaluate cause of self-care deficits   - Assess range of motion  - Assess patient's mobility; develop plan if impaired  - Assess patient's need for assistive devices and provide as appropriate  - Encourage maximum independence but intervene and supervise when necessary  - Involve family in performance of ADLs  - Assess for home care needs following discharge   - Consider OT consult to assist with ADL evaluation and planning for discharge  - Provide patient education as appropriate  3/7/2022 0427 by Srikanth Ledbetter RN  Outcome: Progressing  3/7/2022 0426 by Srikanth Ledbetter RN  Outcome: Progressing  Goal: Maintains/Returns to pre admission functional level  Description: INTERVENTIONS:  - Perform BMAT or MOVE assessment daily    - Set and communicate daily mobility goal to care team and patient/family/caregiver  - Collaborate with rehabilitation services on mobility goals if consulted  - Perform Range of Motion 4 times a day  - Cue patient to reposition every 2 hours    - Dangle patient 4 times a day  - Stand patient 4 times a day  - Ambulate patient 4 times a day  - Out of bed to chair 3 times a day   - Out of bed for meals 3 times a day  - Out of bed for toileting  - Record patient progress and toleration of activity level   3/7/2022 0427 by Meir Powers RN  Outcome: Progressing  3/7/2022 0426 by Meir Powers RN  Outcome: Progressing     Problem: Prexisting or High Potential for Compromised Skin Integrity  Goal: Skin integrity is maintained or improved  Description: INTERVENTIONS:  - Identify patients at risk for skin breakdown  - Assess and monitor skin integrity  - Assess and monitor nutrition and hydration status  - Monitor labs   - Assess for incontinence   - Turn and reposition patient  - Assist with mobility/ambulation  - Relieve pressure over bony prominences  - Avoid friction and shearing  - Provide appropriate hygiene as needed including keeping skin clean and dry  - Evaluate need for skin moisturizer/barrier cream  - Collaborate with interdisciplinary team   - Patient/family teaching  - Consider wound care consult   3/7/2022 0427 by Meir Powers RN  Outcome: Progressing  3/7/2022 0426 by Meir Powers RN  Outcome: Progressing     Problem: PAIN - ADULT  Goal: Verbalizes/displays adequate comfort level or baseline comfort level  Description: Interventions:  - Encourage patient to monitor pain and request assistance  - Assess pain using appropriate pain scale  - Administer analgesics based on type and severity of pain and evaluate response  - Implement non-pharmacological measures as appropriate and evaluate response  - Consider cultural and social influences on pain and pain management  - Notify physician/advanced practitioner if interventions unsuccessful or patient reports new pain  Outcome: Progressing     Problem: INFECTION - ADULT  Goal: Absence or prevention of progression during hospitalization  Description: INTERVENTIONS:  - Assess and monitor for signs and symptoms of infection  - Monitor lab/diagnostic results  - Monitor all insertion sites, i e  indwelling lines, tubes, and drains  - Monitor endotracheal if appropriate and nasal secretions for changes in amount and color  - [place] appropriate cooling/warming therapies per order  - Administer medications as ordered  - Instruct and encourage patient and family to use good hand hygiene technique  - Identify and instruct in appropriate isolation precautions for identified infection/condition  Outcome: Progressing  Goal: Absence of fever/infection during neutropenic period  Description: INTERVENTIONS:  - Monitor WBC    Outcome: Progressing     Problem: DISCHARGE PLANNING  Goal: Discharge to home or other facility with appropriate resources  Description: INTERVENTIONS:  - Identify barriers to discharge w/patient and caregiver  - Arrange for needed discharge resources and transportation as appropriate  - Identify discharge learning needs (meds, wound care, etc )  - Arrange for interpretive services to assist at discharge as needed  - Refer to Case Management Department for coordinating discharge planning if the patient needs post-hospital services based on physician/advanced practitioner order or complex needs related to functional status, cognitive ability, or social support system  Outcome: Progressing     Problem: Knowledge Deficit  Goal: Patient/family/caregiver demonstrates understanding of disease process, treatment plan, medications, and discharge instructions  Description: Complete learning assessment and assess knowledge base    Interventions:  - Provide teaching at level of understanding  - Provide teaching via preferred learning methods  Outcome: Progressing     Problem: Nutrition/Hydration-ADULT  Goal: Nutrient/Hydration intake appropriate for improving, restoring or maintaining nutritional needs  Description: Monitor and assess patient's nutrition/hydration status for malnutrition  Collaborate with interdisciplinary team and initiate plan and interventions as ordered  Monitor patient's weight and dietary intake as ordered or per policy  Utilize nutrition screening tool and intervene as necessary  Determine patient's food preferences and provide high-protein, high-caloric foods as appropriate       INTERVENTIONS:  - Monitor oral intake, urinary output, labs, and treatment plans  - Assess nutrition and hydration status and recommend course of action  - Evaluate amount of meals eaten  - Assist patient with eating if necessary   - Allow adequate time for meals  - Recommend/ encourage appropriate diets, oral nutritional supplements, and vitamin/mineral supplements  - Order, calculate, and assess calorie counts as needed  - Recommend, monitor, and adjust tube feedings and TPN/PPN based on assessed needs  - Assess need for intravenous fluids  - Provide specific nutrition/hydration education as appropriate  - Include patient/family/caregiver in decisions related to nutrition  3/7/2022 0427 by Alena Mejia RN  Outcome: Not Progressing  3/7/2022 0426 by Alena Mejia, RN  Outcome: Progressing

## 2022-03-07 NOTE — ASSESSMENT & PLAN NOTE
Lab Results   Component Value Date    HGBA1C 9 6 (H) 03/02/2022       Recent Labs     03/06/22  1139 03/06/22  1630 03/06/22 2052 03/07/22  0710   POCGLU 422* 244* 265* 160*       Blood Sugar Average: Last 72 hrs:  (P) 147 [id number]   · Patient presented with nausea, vomiting, epigastric pain and was found to have severe hyperglycemia  Mildly elevated anion gap and mildly elevated beta hydroxybutyrate  · Stopped oral hypoglycemic agents including Amaryl, metformin and Januvia  Reportedly issues with noncompliance  · Resolved  Endocrinology consulted, appreciate recommendations  · Patient transitioned off the insulin gtt and converted to basal/bolus regimen  Endocrinology is following  She will need adjustments to her regimen and determination of what doses to remain on at discharge    Her anion gap and bicarb are normal

## 2022-03-07 NOTE — ASSESSMENT & PLAN NOTE
· Patient with leukocytosis POA, 14-->17-->20; now improving with WBC count of 10 68  Anticipate that her leukocytosis might worsen now given that she received 2 doses of dexamethasone  Procalcitonin <0 25 x 3, reassuring- suggesting non infectious process  · tachycardia and lactic acidosis present on admission resolved  · Blood cultures negative at 72 hours  · Urine Cx negative  · CT C/A/P on admission negative  · Continue to monitor off antibiotics    Monitor clinically

## 2022-03-07 NOTE — PROGRESS NOTES
NEPHROLOGY PROGRESS NOTE   Milagros Callahan 78 y o  female MRN: [id number]  Unit/Bed#: S -01 Encounter: [id number]  Reason for Consult: GARFIELD/CKD/HTN    ASSESSMENT/PLAN:  1  Hypertensive Emergency- /133 on admission  - workup: renal artery duplex negative for MADDIE, renin __, aldosterone __, metanephrines __, tsh 1 23, random cortisol 35, am cortisol 4 7, dexamethasone suppression test per endocrinology  - BP now improved and stable  - currently on coreg 25mg BID, chlorthalidone 12 5mg daily, nifedipine 90mg daily  - eventually would benefit from an ACEI/ARB  - consider spironolactone instead of chlorthalidone  - avoid salt  2  Elevated Creatinine / Suspected Chronic Kidney Disease stage 3- suspected baseline creatinine is around the mid 1s  - noted CTA on 3/2  - imaging: no hydronephrosis  - UA: + glucose, 1+ ketones, small blood, >300 protein  - check outpatient UPC ratio  - recently moved from [place], will need office follow up on discharge  3  Hypokalemia- replete as needed  4  Hyponatremia- monitor, acceptable at this time  5  Diabetes mellitus type 2 with DKA- endocrinology and primary team managing  6  Nausea/Vomiting/Abdominal Pain- per GI  - s/p EGD 3/7  - concern for diabetic gastroparesis/delayed emptying    Disposition:  Creatinine and BP stable  GI workup ongoing  SUBJECTIVE:  Patient complaining of abdominal pain with gas and feeling of constipation  Just returned to room after EGD      OBJECTIVE:  Current Weight: Weight - Scale: 81 6 kg (180 lb)  Vitals:    03/07/22 0816 03/07/22 0911 03/07/22 0942 03/07/22 0952   BP: 136/70 139/98 (!) 202/83 (!) 186/80   BP Location:       Pulse: 74 77 77 75   Resp: 18 18 18 18   Temp: 98 °F (36 7 °C) (!) 97 °F (36 1 °C)     TempSrc: Temporal Temporal     SpO2: 98% 96% 96% 100%   Weight:           Intake/Output Summary (Last 24 hours) at 3/7/2022 1047  Last data filed at 3/7/2022 0909  Gross per 24 hour   Intake 300 ml   Output 1200 ml   Net -900 ml General: NAD  Skin: no rash  Eyes: anicteric  ENMT: mm moist  Neck: no masses  Respiratory: CTAB  Cardiac: RRR  Extremities: trace bilateral edema  GI: soft slightly tender  Neuro: alert awake  Psych: mood and affect appropriate    Medications:    Current Facility-Administered Medications:     bisacodyl (DULCOLAX) rectal suppository 10 mg, 10 mg, Rectal, Daily PRN, Galina Melendrez PA-C    carvedilol (COREG) tablet 25 mg, 25 mg, Oral, BID With Meals, Adilson Vazquez PA-C, 25 mg at 03/06/22 1751    chlorthalidone tablet 12 5 mg, 12 5 mg, Oral, Daily, Franchesca Osman MD, 12 5 mg at 03/06/22 1121    famotidine (PEPCID) tablet 20 mg, 20 mg, Oral, BID, Adilson Vazquez PA-C, 20 mg at 03/06/22 1748    insulin glargine (LANTUS) subcutaneous injection 20 Units 0 2 mL, 20 Units, Subcutaneous, HS, Chika Westbrook MD, 20 Units at 03/06/22 2317    insulin lispro (HumaLOG) 100 units/mL subcutaneous injection 1-5 Units, 1-5 Units, Subcutaneous, HS, [address] Atknison MD, 2 Units at 03/06/22 2316    insulin lispro (HumaLOG) 100 units/mL subcutaneous injection 1-6 Units, 1-6 Units, Subcutaneous, TID AC, 3 Units at 03/06/22 1748 **AND** Fingerstick Glucose (POCT), , , TID AC, [address] Jessica Atkinson MD    insulin lispro (HumaLOG) 100 units/mL subcutaneous injection 7 Units, 7 Units, Subcutaneous, TID With Meals, [address] Jessica Atkinson MD, 7 Units at 03/06/22 1749    metoclopramide (REGLAN) injection 10 mg, 10 mg, Intravenous, Q6H PRN, Chika Westbrook MD    metoclopramide (REGLAN) tablet 5 mg, 5 mg, Oral, TID AC, Galina Melendrez PA-C    NIFEdipine (PROCARDIA XL) 24 hr tablet 90 mg, 90 mg, Oral, Daily, Franchesca Osman MD, 90 mg at 03/06/22 0819    ondansetron (ZOFRAN-ODT) dispersible tablet 4 mg, 4 mg, Oral, Q6H PRN, Chika Westbrook MD, 4 mg at 03/06/22 1122    pantoprazole (PROTONIX) EC tablet 40 mg, 40 mg, Oral, BID AC, Chika Westbrook MD, 40 mg at 03/06/22 1752    polyethylene glycol (MIRALAX) packet 17 g, 17 g, Oral, Daily, Jodee Pagan MD, 17 g at 03/06/22 1120    potassium chloride (K-DUR,KLOR-CON) CR tablet 40 mEq, 40 mEq, Oral, Once, Galina Melendrez PA-C    pravastatin (PRAVACHOL) tablet 20 mg, 20 mg, Oral, Daily With Dinner, Galina Melendrez PA-C, 20 mg at 03/06/22 1752    rivaroxaban (XARELTO) tablet 20 mg, 20 mg, Oral, Daily With Breakfast, Galina Melendrez PA-C    senna-docusate sodium (SENOKOT S) 8 6-50 mg per tablet 2 tablet, 2 tablet, Oral, BID, Galian Melendrez PA-C    simethicone (MYLICON) chewable tablet 80 mg, 80 mg, Oral, Q6H PRN, Jodee Pagan MD    sucralfate (CARAFATE) tablet 1 g, 1 g, Oral, Q6H ROM, Galina Melendrez PA-C, 1 g at 03/06/22 2316    Laboratory Results:  Results from last 7 days   Lab Units 03/07/22  0518 03/06/22  0640 03/05/22  0624 03/04/22  0456 03/03/22  0551 03/03/22  0145 03/02/22 2011 03/02/22  0612 03/02/22  0547   WBC Thousand/uL 10 68* 11 97* 14 33* 20 32* 17 20*  --   --   --  14 84*   HEMOGLOBIN g/dL 12 1 13 0 13 2 13 8 13 8  --   --   --  15 1   HEMATOCRIT % 36 4 38 9 38 3 39 5 39 7  --   --   --  43 2   PLATELETS Thousands/uL 299 281 265 281 288  --   --   --  293   POTASSIUM mmol/L 3 4* 3 8 3 7 3 3* 3 5 3 2* 3 4*   < >  --    CHLORIDE mmol/L 103 96* 102 101 103 102 103   < >  --    CO2 mmol/L 30 25 26 27 29 29 30   < >  --    BUN mg/dL 30* 42* 44* 35* 25 23 21   < >  --    CREATININE mg/dL 1 41* 1 65* 1 60* 1 61* 1 43* 1 43* 1 50*   < >  --    CALCIUM mg/dL 8 9 9 6 9 5 9 4 9 4 9 3 9 5   < >  --    MAGNESIUM mg/dL  --   --  2 3  --   --   --   --   --   --     < > = values in this interval not displayed  I have personally reviewed the blood work as stated above and in my note  I have personally reviewed last renal note

## 2022-03-07 NOTE — ANESTHESIA POSTPROCEDURE EVALUATION
Post-Op Assessment Note    CV Status:  Stable  Pain Score: 0    Pain management: adequate     Mental Status:  Alert and awake   Hydration Status:  Euvolemic   PONV Controlled:  Controlled   Airway Patency:  Patent      Post Op Vitals Reviewed: Yes      Staff: Anesthesiologist, CRNA         No complications documented      BP   139/98   Temp   97 0   Pulse  77   Resp   14   SpO2   97% on 2L NC

## 2022-03-07 NOTE — ANESTHESIA PREPROCEDURE EVALUATION
Procedure:  EGD    Relevant Problems   CARDIO   (+) Hypertensive urgency   (+) PAF (paroxysmal atrial fibrillation) (HCC)      /RENAL   (+) GARFIELD (acute kidney injury) ([facility] [address] )      pAfib  GARFIELD    Cr 1 41, hgb 12 1  a1c 9 6       Anesthesia Plan  ASA Score- 3     Anesthesia Type- IV sedation with anesthesia with ASA Monitors  Additional Monitors:   Airway Plan:     Comment: IV sedation,  standard ASA monitors  Risks and benefits discussed with patient; patient consented and agrees to proceed  I saw and evaluated the patient  If seen with CRNA, we have discussed the anesthetic plan and I am in agreement that the plan is appropriate for the patient     Plan Factors-    Chart reviewed  Existing labs reviewed  Induction- intravenous  Postoperative Plan-     Informed Consent- Anesthetic plan and risks discussed with patient  I personally reviewed this patient with the CRNA  Discussed and agreed on the Anesthesia Plan with the CRNA  Yaya Flaherty

## 2022-03-07 NOTE — ASSESSMENT & PLAN NOTE
· Profoundly elevated blood pressure on admission at 270/133, with associated nonspecific EKG changes  · BP now much improved  · Continue home dose of Coreg 25 mg p o  BID, nifedipine 90 mg daily added as well as chlorthalidone 12 5 mg daily; continue to hold Ace or Arb  No further adjustments at this time  · Secondary HTN work up in process  · Appreciate endocrinology assistance  Random cortisol level was 35 3 patient was to have dexamethasone suppression test however labs were not obtained correctly therefore will repeat dexamethasone suppression test requested  Endocrinology following  Follow-up a  m on labs  · Also appreciate nephrology input    Pheochromocytoma workup in progress

## 2022-03-07 NOTE — PROGRESS NOTES
[facility]  Progress Note Favian Rocio 1945, 78 y o  female MRN: [id number]  Unit/Bed#: S -01 Encounter: [id number]  Primary Care Provider: Janeth Gonzales MD   Date and time admitted to hospital: 3/2/2022  5:21 AM    * Abdominal pain with nausea and vomiting  Assessment & Plan  · Patient brought in for abdominal pain with nausea and vomiting, Polish speaking only and provided minimal history but noted to be frequently belching and moaning on admission  Noted to have central abdominal TTP  CTA C/A/P on admission was unremarkable aside from mild fatty liver  Lab work with elevated total bilirubin but normal LFTs, as well as SIRS criteria  Right upper quadrant ultrasound- biliary ductal dilatation  Patient is status post cholecystectomy  · Patient was seen and evaluated by GI on 03/04, thought possibly viral gastroenteritis  Patient's symptoms were initially improving with supportive cares ie  IV fluids and antiemetics, however patient with worsening nausea and vomiting again over weekend and abdominal cramping/feeling bloated and gassy per the patient  · Suspect there may be a component of diabetic gastroparesis, will need outpatient gastric emptying study  · EGD performed today only showed mild gastritis and biopsies were taken to rule out H pylori  · Will try around the clock low-dose Reglan before meals and continue Protonix and Carafate  · Will try again to advance diet        DKA (diabetic ketoacidosis) [facility])  Assessment & Plan  Lab Results   Component Value Date    HGBA1C 9 6 (H) 03/02/2022       Recent Labs     03/06/22  1139 03/06/22  1630 03/06/22 2052 03/07/22  0710   POCGLU 422* 244* 265* 160*       Blood Sugar Average: Last 72 hrs:  (P) 536 [id number]   · Patient presented with nausea, vomiting, epigastric pain and was found to have severe hyperglycemia   Mildly elevated anion gap and mildly elevated beta hydroxybutyrate  · Stopped oral hypoglycemic agents including Amaryl, metformin and Januvia  Reportedly issues with noncompliance  · Resolved  Endocrinology consulted, appreciate recommendations  · Patient transitioned off the insulin gtt and converted to basal/bolus regimen  Endocrinology is following  She will need adjustments to her regimen and determination of what doses to remain on at discharge  Her anion gap and bicarb are normal    Hypertensive urgency  Assessment & Plan  · Profoundly elevated blood pressure on admission at 270/133, with associated nonspecific EKG changes  · BP now much improved  · Continue home dose of Coreg 25 mg p o  BID, nifedipine 90 mg daily added as well as chlorthalidone 12 5 mg daily; continue to hold Ace or Arb  No further adjustments at this time  · Secondary HTN work up in process  · Appreciate endocrinology assistance  Random cortisol level was 35 3 patient was to have dexamethasone suppression test however labs were not obtained correctly therefore will repeat dexamethasone suppression test requested  Endocrinology following  Follow-up a  m on labs  · Also appreciate nephrology input  Pheochromocytoma workup in progress    SIRS (systemic inflammatory response syndrome) (HCC)-resolved as of 3/6/2022  Assessment & Plan  · Patient with leukocytosis POA, 14-->17-->20; now improving with WBC count of 10 68  Anticipate that her leukocytosis might worsen now given that she received 2 doses of dexamethasone  Procalcitonin <0 25 x 3, reassuring- suggesting non infectious process  · tachycardia and lactic acidosis present on admission resolved  · Blood cultures negative at 72 hours  · Urine Cx negative  · CT C/A/P on admission negative  · Continue to monitor off antibiotics  Monitor clinically        Hypoxia  Assessment & Plan  · Mild hypoxia noted in the past 24 hours with room air sat 87-88% Currently 91 percent on 2 liters    Not offering any complaints of shortness of breath  · Check chest x-ray now  · Encourage more mobility and incentive spirometry  GARFIELD (acute kidney injury) [facility])  Assessment & Plan  · Nephrology following  Patient likely has element CKD stage 3  Increasing creatinine to 1 6 on 3/4 likely from contrast nephropathy from CTA and addition of ACE-inhibitor  · Currently creatinine is stable with s Cr 1 41  · Continue to monitor renal function  · Avoid nephrotoxic agents and hypotension  · Continue to hold ACE-inhibitor/ARB- can consider switching nifedipine to ACE-inhibitor or Arb after discharge if renal function remains stable    Constipation  Assessment & Plan  · Continue bowel regimen  · Outpatient colonoscopy    Hypokalemia  Assessment & Plan  · Replete again and follow    Delusional disorder [facility])  Assessment & Plan  · Per history provided by son  Patient has had previous inpatient psychiatric stay in [place]    [facility]ox[facility]  Assessment & Plan  · On xarelto but initially indication was unclear  In conversation using [email or web address] , patient reported history of atrial fibrillation  · We held Xarelto temporarily in case need for any invasive interventions, but she was not noted to have any events of bleeding and per my conversation with GI today we can resume Xarelto    Elevated brain natriuretic peptide (BNP) level  Assessment & Plan  · No previous levels on file to compare, check echocardiogram non-urgently  · No current evidence of heart failure      VTE Pharmacologic Prophylaxis:   Pharmacologic: Rivaroxaban (Xarelto)--resume this today  Mechanical VTE Prophylaxis in Place: No    Patient Centered Rounds: I have performed bedside rounds with nursing staff today  Discussions with Specialists or Other Care Team Provider:  Case discussed with case management and GI as well as my attending    Education and Discussions with Family / Patient:  Spoke with patient's son over the phone    Time Spent for Care: 30 minutes    More than 50% of total time spent on counseling and coordination of care as described above  Current Length of Stay: 5 day(s)    Current Patient Status: Inpatient   Certification Statement: The patient will continue to require additional inpatient hospital stay due to Re-attempt advancement in diet, monitor blood sugars and blood pressure  Follow-up on cortisol levels    Discharge Plan:  Possibly discharge home tomorrow if cleared by all consultants involved    Code Status: Level 1 - Full Code    Subjective:   Patient continues to report central abdominal discomfort and constipation  Objective:     Vitals:   Temp (24hrs), Av 3 °F (36 8 °C), Min:97 °F (36 1 °C), Max:99 4 °F (37 4 °C)    Temp:  [97 °F (36 1 °C)-99 4 °F (37 4 °C)] 97 °F (36 1 °C)  HR:  [68-89] 75  Resp:  [16-18] 18  BP: (115-202)/(59-98) 186/80  SpO2:  [88 %-100 %] 100 %  There is no height or weight on file to calculate BMI  Input and Output Summary (last 24 hours): Intake/Output Summary (Last 24 hours) at 3/7/2022 0956  Last data filed at 3/7/2022 0909  Gross per 24 hour   Intake 300 ml   Output 1200 ml   Net -900 ml       Physical Exam:     Physical Exam  Vitals reviewed  Constitutional:       General: She is not in acute distress  Appearance: She is obese  She is not ill-appearing, toxic-appearing or diaphoretic  Comments: Nontoxic appearing lying in bed comfortably without any visible signs of distress   Eyes:      General:         Right eye: No discharge  Cardiovascular:      Rate and Rhythm: Normal rate and regular rhythm  Heart sounds: No murmur heard  Pulmonary:      Effort: No respiratory distress  Breath sounds: No stridor  No wheezing or rhonchi  Comments: Limited effort provided  However noted that patient is on 2 liters of oxygen with O2 sat 91%  No cough, dyspnea, tachypnea noted  Abdominal:      General: There is no distension  Palpations: Abdomen is soft  Tenderness:  There is no abdominal tenderness  There is no guarding  Musculoskeletal:      Right lower leg: No edema  Left lower leg: No edema  Skin:     General: Skin is warm and dry  Coloration: Skin is not jaundiced or pale  Findings: No bruising, erythema, lesion or rash  Neurological:      General: No focal deficit present  Mental Status: She is alert  Mental status is at baseline  Comments: Awake alert   Psychiatric:         Mood and Affect: Mood normal            Additional Data:     Labs:    Results from last 7 days   Lab Units 03/07/22  0518   WBC Thousand/uL 10 68*   HEMOGLOBIN g/dL 12 1   HEMATOCRIT % 36 4   PLATELETS Thousands/uL 299   NEUTROS PCT % 57   LYMPHS PCT % 32   MONOS PCT % 8   EOS PCT % 1     Results from last 7 days   Lab Units 03/07/22  0518 03/06/22  0640 03/05/22  0624   SODIUM mmol/L 141   < > 136   POTASSIUM mmol/L 3 4*   < > 3 7   CHLORIDE mmol/L 103   < > 102   CO2 mmol/L 30   < > 26   BUN mg/dL 30*   < > 44*   CREATININE mg/dL 1 41*   < > 1 60*   ANION GAP mmol/L 8   < > 8   CALCIUM mg/dL 8 9   < > 9 5   ALBUMIN g/dL  --   --  3 3*   TOTAL BILIRUBIN mg/dL  --   --  1 99*   ALK PHOS U/L  --   --  65   ALT U/L  --   --  22   AST U/L  --   --  14   GLUCOSE RANDOM mg/dL 158*   < > 139    < > = values in this interval not displayed       Results from last 7 days   Lab Units 03/02/22  0951   INR  1 02     Results from last 7 days   Lab Units 03/07/22  0710 03/06/22  2052 03/06/22  1630 03/06/22  1139 03/06/22  0815 03/05/22  2112 03/05/22  1550 03/05/22  1220 03/05/22  1002 03/05/22  0800 03/05/22  0618 03/05/22  0400   POC GLUCOSE mg/dl 160* 265* 244* 422* 285* 259* 348* 221* 220* 139 138 126     Results from last 7 days   Lab Units 03/02/22  0951   HEMOGLOBIN A1C % 9 6*     Results from last 7 days   Lab Units 03/05/22  0624 03/04/22  0456 03/03/22  1156 03/02/22  1806 03/02/22  1545 03/02/22  0951 03/02/22  0547   LACTIC ACID mmol/L  --   --   --  2 0 3 1* 2 1* 2 3*   PROCALCITONIN ng/ml 0 23 0  22 0 13  --   --   --   --        * I Have Reviewed All Lab Data Listed Above  * Additional Pertinent Lab Tests Reviewed: [facility] Admission Reviewed    Imaging:    Imaging Reports Reviewed Today Include:   Imaging Personally Reviewed by Myself Includes:      Recent Cultures (last 7 days):     Results from last 7 days   Lab Units 03/02/22  0630 03/02/22  0612 03/02/22  0547   BLOOD CULTURE   --  No Growth After 4 Days  No Growth After 4 Days     URINE CULTURE  No Growth <1000 cfu/mL  --   --        Last 24 Hours Medication List:   Current Facility-Administered Medications   Medication Dose Route Frequency Provider Last Rate    bisacodyl  10 mg Rectal Daily PRN Galina Melendrez PA-C      carvedilol  25 mg Oral BID With Meals Teagan Zepeda PA-C      chlorthalidone  12 5 mg Oral Daily Mary Ellen Huffman MD      famotidine  20 mg Oral BID Teagan Zepeda PA-C      insulin glargine  20 Units Subcutaneous HS Francisco Javier Dwyer MD      insulin lispro  1-5 Units Subcutaneous HS Tino Kelly MD      insulin lispro  1-6 Units Subcutaneous TID AC Tino Kelly MD      insulin lispro  7 Units Subcutaneous TID With Meals Tino Kelly MD      metoclopramide  10 mg Intravenous Q6H PRN Francisco Javier Dwyer MD      metoclopramide  5 mg Oral TID AC Galina Melendrez PA-C      NIFEdipine  90 mg Oral Daily Mary Ellen Huffman MD      ondansetron  4 mg Oral Q6H PRN Francisco Javier Dwyer MD      pantoprazole  40 mg Oral BID AC Francisco Javier Dwyer MD      polyethylene glycol  17 g Oral Daily Francisco Javier Dwyer MD      potassium chloride  40 mEq Oral Once Candi العراقي PA-C      pravastatin  20 mg Oral Daily With [employer]      rivaroxaban  20 mg Oral Daily With Breakfast Galina Melendrez PA-C      senna-docusate sodium  2 tablet Oral BID Galina Melendrez PA-C      simethicone  80 mg Oral Q6H PRN Francisco Javier Dwyer MD      sucralfate  1 g Oral Q6H [facility] Galina Melendrez PA-C          Today, Patient Was Seen By: Gilberto Tena PA-C    ** Please Note: Dictation voice to text software may have been used in the creation of this document   **

## 2022-03-07 NOTE — ASSESSMENT & PLAN NOTE
· Nephrology following  Patient likely has element CKD stage 3    Increasing creatinine to 1 6 on 3/4 likely from contrast nephropathy from CTA and addition of ACE-inhibitor  · Currently creatinine is stable with s Cr 1 41  · Continue to monitor renal function  · Avoid nephrotoxic agents and hypotension  · Continue to hold ACE-inhibitor/ARB- can consider switching nifedipine to ACE-inhibitor or Arb after discharge if renal function remains stable

## 2022-03-08 ENCOUNTER — TELEPHONE (OUTPATIENT)
Dept: GASTROENTEROLOGY | Facility: CLINIC | Age: 77
End: 2022-03-08

## 2022-03-08 ENCOUNTER — APPOINTMENT (INPATIENT)
Dept: NON INVASIVE DIAGNOSTICS | Facility: HOSPITAL | Age: 77
DRG: 391 | End: 2022-03-08
Payer: MEDICARE

## 2022-03-08 PROBLEM — N18.9 ACUTE KIDNEY INJURY SUPERIMPOSED ON CHRONIC KIDNEY DISEASE (HCC): Status: ACTIVE | Noted: 2022-03-03

## 2022-03-08 PROBLEM — E27.8 ADRENAL NODULE (HCC): Status: ACTIVE | Noted: 2022-03-08

## 2022-03-08 LAB
ANION GAP SERPL CALCULATED.3IONS-SCNC: 7 MMOL/L (ref 4–13)
AORTIC ROOT: 3.7 CM
APICAL FOUR CHAMBER EJECTION FRACTION: 61 %
ASCENDING AORTA: 3.2 CM
BASOPHILS # BLD AUTO: 0.05 THOUSANDS/ΜL (ref 0–0.1)
BASOPHILS NFR BLD AUTO: 1 % (ref 0–1)
BUN SERPL-MCNC: 27 MG/DL (ref 5–25)
CALCIUM SERPL-MCNC: 8.9 MG/DL (ref 8.3–10.1)
CHLORIDE SERPL-SCNC: 104 MMOL/L (ref 100–108)
CO2 SERPL-SCNC: 29 MMOL/L (ref 21–32)
CREAT SERPL-MCNC: 1.31 MG/DL (ref 0.6–1.3)
E WAVE DECELERATION TIME: 310 MS
EOSINOPHIL # BLD AUTO: 0.19 THOUSAND/ΜL (ref 0–0.61)
EOSINOPHIL NFR BLD AUTO: 2 % (ref 0–6)
ERYTHROCYTE [DISTWIDTH] IN BLOOD BY AUTOMATED COUNT: 13.9 % (ref 11.6–15.1)
FRACTIONAL SHORTENING: 28 % (ref 28–44)
GFR SERPL CREATININE-BSD FRML MDRD: 40 ML/MIN/1.73SQ M
GLUCOSE SERPL-MCNC: 132 MG/DL (ref 65–140)
GLUCOSE SERPL-MCNC: 134 MG/DL (ref 65–140)
GLUCOSE SERPL-MCNC: 158 MG/DL (ref 65–140)
GLUCOSE SERPL-MCNC: 163 MG/DL (ref 65–140)
GLUCOSE SERPL-MCNC: 171 MG/DL (ref 65–140)
GLUCOSE SERPL-MCNC: 241 MG/DL (ref 65–140)
HCT VFR BLD AUTO: 38 % (ref 34.8–46.1)
HGB BLD-MCNC: 12.5 G/DL (ref 11.5–15.4)
IMM GRANULOCYTES # BLD AUTO: 0.07 THOUSAND/UL (ref 0–0.2)
IMM GRANULOCYTES NFR BLD AUTO: 1 % (ref 0–2)
INTERVENTRICULAR SEPTUM IN DIASTOLE (PARASTERNAL SHORT AXIS VIEW): 1.5 CM
INTERVENTRICULAR SEPTUM: 1.5 CM (ref 0.6–1.1)
LAAS-AP4: 14 CM2
LEFT ATRIUM SIZE: 2.5 CM
LEFT INTERNAL DIMENSION IN SYSTOLE: 3.1 CM (ref 2.1–4)
LEFT VENTRICULAR INTERNAL DIMENSION IN DIASTOLE: 4.3 CM (ref 3.5–6)
LEFT VENTRICULAR POSTERIOR WALL IN END DIASTOLE: 1.6 CM
LEFT VENTRICULAR STROKE VOLUME: 44 ML
LVSV (TEICH): 44 ML
LYMPHOCYTES # BLD AUTO: 3.09 THOUSANDS/ΜL (ref 0.6–4.47)
LYMPHOCYTES NFR BLD AUTO: 28 % (ref 14–44)
MCH RBC QN AUTO: 27.7 PG (ref 26.8–34.3)
MCHC RBC AUTO-ENTMCNC: 32.9 G/DL (ref 31.4–37.4)
MCV RBC AUTO: 84 FL (ref 82–98)
MONOCYTES # BLD AUTO: 1.05 THOUSAND/ΜL (ref 0.17–1.22)
MONOCYTES NFR BLD AUTO: 10 % (ref 4–12)
MV E'TISSUE VEL-SEP: 5 CM/S
MV PEAK A VEL: 1.03 M/S
MV PEAK E VEL: 58 CM/S
MV STENOSIS PRESSURE HALF TIME: 90 MS
MV VALVE AREA P 1/2 METHOD: 2.44 CM2
NEUTROPHILS # BLD AUTO: 6.45 THOUSANDS/ΜL (ref 1.85–7.62)
NEUTS SEG NFR BLD AUTO: 58 % (ref 43–75)
NRBC BLD AUTO-RTO: 0 /100 WBCS
PLATELET # BLD AUTO: 279 THOUSANDS/UL (ref 149–390)
PMV BLD AUTO: 10.8 FL (ref 8.9–12.7)
POTASSIUM SERPL-SCNC: 3.7 MMOL/L (ref 3.5–5.3)
RBC # BLD AUTO: 4.52 MILLION/UL (ref 3.81–5.12)
RIGHT ATRIAL 2D VOLUME: 21 ML
RIGHT ATRIUM AREA SYSTOLE A4C: 11 CM2
RIGHT VENTRICLE ID DIMENSION: 2.4 CM
SL CV LV EF: 60
SL CV PED ECHO LEFT VENTRICLE DIASTOLIC VOLUME (MOD BIPLANE) 2D: 82 ML
SL CV PED ECHO LEFT VENTRICLE SYSTOLIC VOLUME (MOD BIPLANE) 2D: 38 ML
SODIUM SERPL-SCNC: 140 MMOL/L (ref 136–145)
WBC # BLD AUTO: 10.9 THOUSAND/UL (ref 4.31–10.16)

## 2022-03-08 PROCEDURE — 99232 SBSQ HOSP IP/OBS MODERATE 35: CPT | Performed by: INTERNAL MEDICINE

## 2022-03-08 PROCEDURE — 93306 TTE W/DOPPLER COMPLETE: CPT

## 2022-03-08 PROCEDURE — 99232 SBSQ HOSP IP/OBS MODERATE 35: CPT | Performed by: PHYSICIAN ASSISTANT

## 2022-03-08 PROCEDURE — 80048 BASIC METABOLIC PNL TOTAL CA: CPT | Performed by: PHYSICIAN ASSISTANT

## 2022-03-08 PROCEDURE — 93306 TTE W/DOPPLER COMPLETE: CPT | Performed by: INTERNAL MEDICINE

## 2022-03-08 PROCEDURE — 82948 REAGENT STRIP/BLOOD GLUCOSE: CPT

## 2022-03-08 PROCEDURE — 85025 COMPLETE CBC W/AUTO DIFF WBC: CPT | Performed by: PHYSICIAN ASSISTANT

## 2022-03-08 RX ORDER — LISINOPRIL 10 MG/1
10 TABLET ORAL
Status: DISCONTINUED | OUTPATIENT
Start: 2022-03-08 | End: 2022-03-09 | Stop reason: HOSPADM

## 2022-03-08 RX ADMIN — CARVEDILOL 25 MG: 12.5 TABLET, FILM COATED ORAL at 09:13

## 2022-03-08 RX ADMIN — SUCRALFATE 1 G: 1 TABLET ORAL at 11:30

## 2022-03-08 RX ADMIN — METOCLOPRAMIDE 5 MG: 5 TABLET ORAL at 10:57

## 2022-03-08 RX ADMIN — METOCLOPRAMIDE 5 MG: 5 TABLET ORAL at 06:22

## 2022-03-08 RX ADMIN — INSULIN GLARGINE 24 UNITS: 100 INJECTION, SOLUTION SUBCUTANEOUS at 22:27

## 2022-03-08 RX ADMIN — INSULIN LISPRO 1 UNITS: 100 INJECTION, SOLUTION INTRAVENOUS; SUBCUTANEOUS at 02:05

## 2022-03-08 RX ADMIN — INSULIN LISPRO 1 UNITS: 100 INJECTION, SOLUTION INTRAVENOUS; SUBCUTANEOUS at 19:06

## 2022-03-08 RX ADMIN — INSULIN LISPRO 1 UNITS: 100 INJECTION, SOLUTION INTRAVENOUS; SUBCUTANEOUS at 22:27

## 2022-03-08 RX ADMIN — FAMOTIDINE 20 MG: 20 TABLET ORAL at 17:22

## 2022-03-08 RX ADMIN — RIVAROXABAN 20 MG: 20 TABLET, FILM COATED ORAL at 09:13

## 2022-03-08 RX ADMIN — LISINOPRIL 10 MG: 10 TABLET ORAL at 22:27

## 2022-03-08 RX ADMIN — PRAVASTATIN SODIUM 20 MG: 20 TABLET ORAL at 17:22

## 2022-03-08 RX ADMIN — PANTOPRAZOLE SODIUM 40 MG: 40 TABLET, DELAYED RELEASE ORAL at 06:22

## 2022-03-08 RX ADMIN — INSULIN LISPRO 9 UNITS: 100 INJECTION, SOLUTION INTRAVENOUS; SUBCUTANEOUS at 19:06

## 2022-03-08 RX ADMIN — SENNOSIDES AND DOCUSATE SODIUM 2 TABLET: 50; 8.6 TABLET ORAL at 09:02

## 2022-03-08 RX ADMIN — FAMOTIDINE 20 MG: 20 TABLET ORAL at 09:03

## 2022-03-08 RX ADMIN — SUCRALFATE 1 G: 1 TABLET ORAL at 06:22

## 2022-03-08 RX ADMIN — SENNOSIDES AND DOCUSATE SODIUM 2 TABLET: 50; 8.6 TABLET ORAL at 17:22

## 2022-03-08 RX ADMIN — METOCLOPRAMIDE 5 MG: 5 TABLET ORAL at 17:25

## 2022-03-08 RX ADMIN — SUCRALFATE 1 G: 1 TABLET ORAL at 00:02

## 2022-03-08 RX ADMIN — PANTOPRAZOLE SODIUM 40 MG: 40 TABLET, DELAYED RELEASE ORAL at 17:26

## 2022-03-08 RX ADMIN — POLYETHYLENE GLYCOL 3350 17 G: 17 POWDER, FOR SOLUTION ORAL at 09:03

## 2022-03-08 RX ADMIN — SIMETHICONE 80 MG: 80 TABLET, CHEWABLE ORAL at 10:57

## 2022-03-08 RX ADMIN — SUCRALFATE 1 G: 1 TABLET ORAL at 10:57

## 2022-03-08 RX ADMIN — INSULIN LISPRO 3 UNITS: 100 INJECTION, SOLUTION INTRAVENOUS; SUBCUTANEOUS at 12:45

## 2022-03-08 RX ADMIN — SUCRALFATE 1 G: 1 TABLET ORAL at 17:22

## 2022-03-08 RX ADMIN — CARVEDILOL 25 MG: 12.5 TABLET, FILM COATED ORAL at 17:22

## 2022-03-08 NOTE — ASSESSMENT & PLAN NOTE
· Patient with leukocytosis POA, 14-->17-->20; now improving with WBC count of 10 9  Stable despite dexamethasone  · Source was not definite, possible viral gastroenteritis (vs gastroparesis)  · Procalcitonin <0 25 x 3, reassuring- suggesting non infectious process  · tachycardia and lactic acidosis present on admission resolved  · Blood cultures negative at 72 hours  · Urine Cx negative  · CT C/A/P on admission negative  · Continue to monitor off antibiotics    Monitor clinically

## 2022-03-08 NOTE — ASSESSMENT & PLAN NOTE
· Patient brought in for abdominal pain with nausea and vomiting, Polish speaking only and provided minimal history but noted to be frequently belching and moaning on admission  Noted to have central abdominal TTP  CTA C/A/P on admission was unremarkable aside from mild fatty liver  Lab work with elevated total bilirubin but normal LFTs, as well as SIRS criteria  Right upper quadrant ultrasound- biliary ductal dilatation  Patient is status post cholecystectomy  · Patient was seen and evaluated by GI on 03/04, thought possibly viral gastroenteritis  Patient's symptoms were initially improving with supportive cares ie  IV fluids and antiemetics, however patient with worsening nausea and vomiting again over weekend and abdominal cramping/feeling bloated and gassy per the patient      · Suspect component of diabetic gastroparesis, will need outpatient gastric emptying study  · EGD performed only showed mild gastritis and biopsies were taken to rule out H pylori (pending)  · Continue trial of around the clock low-dose Reglan before meals and continue Protonix and Carafate  · Continue ulcer free diet/small meals

## 2022-03-08 NOTE — ASSESSMENT & PLAN NOTE
· Mild hypoxia noted 3/6-3/7/22 with room air sat 87-88%  Presently much improved O2 sat on room air  Not offering any complaints of shortness of breath  · March 7th chest x-ray read as mild pulmonary edema, but given overall improvement would not push with diuretics at this time unless nephrology recommends  · Encourage more mobility and incentive spirometry

## 2022-03-08 NOTE — PROGRESS NOTES
Progress Note - Monica Waite 78 y o  female MRN: [id number]    Unit/Bed#: S -01 Encounter: [id number]    Assessment and Plan:   Principal Problem:    Abdominal pain with nausea and vomiting  Active Problems:    Hypertensive urgency    DKA (diabetic ketoacidosis) (HCC)    Elevated brain natriuretic peptide (BNP) level    PAF (paroxysmal atrial fibrillation) (HCC)    Acute kidney injury superimposed on chronic kidney disease (HCC)    Delusional disorder (HCC)    Hypokalemia    Constipation    Hypoxia    Other proteinuria    Chronic kidney disease    Adrenal nodule ([facility] [address] )    #1  Persistent nausea, vomiting and abdominal pain: thought to be due to viral gastroenteritis but symptoms persisted so EGD yesterday performed showing gastritis  Suspect possibly diabetic gastroparesis, today is much improved  -continue reglan as needed upon d/c  -plan for outpatietn GES  -diet as tolerated, low residue diet and small meals  -good glycemic control  -Follow up bx   -bowel regiment as ordered  -can be d/c home from GI standpoint    ----------------------------------------------------------------------------------------------------------------    Subjective: Ate all of her breakfast this AM and denies any nausea, vomiting or abdominal pain     Objective:     Vitals: Blood pressure 132/57, pulse 73, temperature 98 6 °F (37 °C), temperature source Oral, resp  rate 16, weight 81 6 kg (180 lb), SpO2 95 %  ,There is no height or weight on file to calculate BMI        Intake/Output Summary (Last 24 hours) at 3/8/2022 1044  Last data filed at 3/8/2022 3802  Gross per 24 hour   Intake 480 ml   Output 980 ml   Net -500 ml       Physical Exam:     General Appearance: Alert, appears stated age and cooperative  Lungs: Clear to auscultation bilaterally, no rales or rhonchi, no labored breathing/accessory muscle use  Heart: Regular rate and rhythm, S1, S2 normal, no murmur, click, rub or gallop  Abdomen: Soft, non-tender, obese abdomen, non-distended; bowel sounds normal; no masses or no organomegaly  Extremities: No cyanosis, clubbing, or edema    Invasive Devices  Report    Peripheral Intravenous Line            Peripheral IV 03/06/22 Left Forearm 1 day                Lab Results:  Results from last 7 days   Lab Units 03/08/22  0508   WBC Thousand/uL 10 90*   HEMOGLOBIN g/dL 12 5   HEMATOCRIT % 38 0   PLATELETS Thousands/uL 279   NEUTROS PCT % 58   LYMPHS PCT % 28   MONOS PCT % 10   EOS PCT % 2     Results from last 7 days   Lab Units 03/08/22  0508 03/06/22  0640 03/05/22  0624   POTASSIUM mmol/L 3 7   < > 3 7   CHLORIDE mmol/L 104   < > 102   CO2 mmol/L 29   < > 26   BUN mg/dL 27*   < > 44*   CREATININE mg/dL 1 31*   < > 1 60*   CALCIUM mg/dL 8 9   < > 9 5   ALK PHOS U/L  --   --  65   ALT U/L  --   --  22   AST U/L  --   --  14    < > = values in this interval not displayed  Invalid input(s): BILI  Results from last 7 days   Lab Units 03/02/22  0951   INR  1 02     Results from last 7 days   Lab Units 03/02/22  0612   LIPASE u/L 185       Imaging Studies: I have personally reviewed pertinent imaging studies  EGD    Result Date: 3/7/2022  Impression: 1  Mild gastritis  2  Single diverticulum noted in the 2nd portion of the duodenum  RECOMMENDATION: Await pathology results Follow-up biopsy results in 2 weeks  If patient continues have persistent symptoms of nausea vomiting, can consider outpatient gastric emptying study to assess for gastroparesis  In the interim, would recommend non ulcerogenic diet  Would also recommend starting on a bowel regimen such as MiraLax 1-2 tbsp on daily basis to prevent constipation  Avoid NSAIDs  Vickey Flores MD     XR chest portable    Result Date: 3/8/2022  Impression: Mild pulmonary vascular congestion  Workstation performed: [id number]     XR chest 1 view portable    Result Date: 3/2/2022  Impression: No acute cardiopulmonary disease   Workstation performed: [id number]     CT head without contrast    Result Date: 3/2/2022  Impression: No acute intracranial abnormality  Workstation performed: [id number]     CTA dissection protocol chest/abdomen/pelvis    Result Date: 3/2/2022  Impression: No acute pathology  No aortic aneurysm or dissection  Incidental 2-3 mm right upper lobe nodules  Based on current Fleischner Society 2017 Guidelines on incidental pulmonary nodule, no routine follow-up is needed if the patient is low risk  If the patient is high risk, optional follow-up chest CT at 12 months can be considered  Fatty liver  Colonic diverticulosis without diverticulitis  Workstation performed: [id number]     US right upper quadrant with liver dopplers    Result Date: 3/3/2022  Impression: 1  No acute abnormality  2   Mildly echogenic liver consistent with hepatic steatosis  3   Normal liver Dopplers   Workstation performed: [id number]

## 2022-03-08 NOTE — PROGRESS NOTES
[facility]  Progress Note iFdelia Henderson 1945, 78 y o  female MRN: [id number]  Unit/Bed#: S -01 Encounter: [id number]  Primary Care Provider: Jessica Higginbotham MD   Date and time admitted to hospital: 3/2/2022  5:21 AM    * Abdominal pain with nausea and vomiting  Assessment & Plan  · Patient brought in for abdominal pain with nausea and vomiting, Polish speaking only and provided minimal history but noted to be frequently belching and moaning on admission  Noted to have central abdominal TTP  CTA C/A/P on admission was unremarkable aside from mild fatty liver  Lab work with elevated total bilirubin but normal LFTs, as well as SIRS criteria  Right upper quadrant ultrasound- biliary ductal dilatation  Patient is status post cholecystectomy  · Patient was seen and evaluated by GI on 03/04, thought possibly viral gastroenteritis  Patient's symptoms were initially improving with supportive cares ie  IV fluids and antiemetics, however patient with worsening nausea and vomiting again over weekend and abdominal cramping/feeling bloated and gassy per the patient  · Suspect component of diabetic gastroparesis, will need outpatient gastric emptying study  · EGD performed only showed mild gastritis and biopsies were taken to rule out H pylori (pending)  · Continue trial of around the clock low-dose Reglan before meals and continue Protonix and Carafate  · Continue ulcer free diet/small meals        DKA (diabetic ketoacidosis) [facility])  Assessment & Plan  Lab Results   Component Value Date    HGBA1C 9 6 (H) 03/02/2022       Recent Labs     03/07/22  1548 03/07/22  2040 03/08/22  0203 03/08/22  0655   POCGLU 204* 252* 163* 134     Blood Sugar Average: Last 72 hrs:  (P) 216 [id number]   · Patient presented with nausea, vomiting, epigastric pain and was found to have severe hyperglycemia   Mildly elevated anion gap and mildly elevated beta hydroxybutyrate  · Stopped oral hypoglycemic agents including Amaryl, metformin and Januvia  Reportedly issues with noncompliance  · Resolved  Endocrinology consulted, appreciate recommendations  · Patient transitioned off the insulin gtt and converted to basal/bolus regimen  Endocrinology is following  She will need adjustments to her regimen and determination of what doses to remain on at discharge  Her anion gap and bicarb are normal    Hypertensive urgency  Assessment & Plan  · Profoundly elevated blood pressure on admission at 270/133, with associated nonspecific EKG changes  · BP now much improved  · Continue home dose of Coreg 25 mg p o  BID; nifedipine 90 mg daily, chlorthalidone 12 5 mg daily  Lisinopril 10 mg daily also added  · Secondary HTN work up in process  · Appreciate endocrine and renal input  · 24 hour urine for catecholemines was initiated this morning at 6:00 a m  However, there is a risk of being inaccurate in the hospital setting and this may require the test to be repeated at home or we can consider discontinuing the test if she is going home today per my discussion with endocrinology  Serum catecholemines in process    SIRS (systemic inflammatory response syndrome) (HCC)-resolved as of 3/6/2022  Assessment & Plan  · Patient with leukocytosis POA, 14-->17-->20; now improving with WBC count of 10 9  Stable despite dexamethasone  · Source was not definite, possible viral gastroenteritis (vs gastroparesis)  · Procalcitonin <0 25 x 3, reassuring- suggesting non infectious process  · tachycardia and lactic acidosis present on admission resolved  · Blood cultures negative at 72 hours  · Urine Cx negative  · CT C/A/P on admission negative  · Continue to monitor off antibiotics  Monitor clinically        Hypoxia  Assessment & Plan  · Mild hypoxia noted 3/6-3/7/22 with room air sat 87-88%  Presently much improved O2 sat on room air   Not offering any complaints of shortness of breath  · March 7th chest x-ray read as mild pulmonary edema, but given overall improvement would not push with diuretics at this time unless nephrology recommends  · Encourage more mobility and incentive spirometry  Acute kidney injury superimposed on chronic kidney disease [facility])  Assessment & Plan  · Nephrology following  Patient likely has element CKD stage 3  Increased creatinine to 1 6 on 3/4/22 likely from contrast nephropathy from CTA and addition of ACE-inhibitor  · Currently creatinine is stable with s Cr 1 31  · Continue to monitor renal function  · Avoid nephrotoxic agents and hypotension  · Follow creatinine with addition of ACEI    Adrenal nodule ([facility] )  Assessment & Plan  · Appreciate endocrinology--obtaining urine and blood studies    Constipation  Assessment & Plan  · Continue bowel regimen  · Outpatient colonoscopy    Delusional disorder [facility])  Assessment & Plan  · Per history provided by son  Patient has had previous inpatient psychiatric stay in [place]    PAF (paroxysmal atrial fibrillation) ([facility] )  Assessment & Plan  · On xarelto but initially indication was unclear  In conversation using "[employer] , patient reported history of atrial fibrillation  · We held Xarelto temporarily in case need for any invasive interventions, but she was not noted to have any events of bleeding and per my conversation with GI we resumed Xarelto    Elevated brain natriuretic peptide (BNP) level  Assessment & Plan  · BNP elevated on admission    No previous levels on file to compare, will check echocardiogram today if able given mild pulmonary edema on cxr and severe HTN    VTE Pharmacologic Prophylaxis:   Pharmacologic: Rivaroxaban (Xarelto)  Mechanical VTE Prophylaxis in Place: No    Patient Centered Rounds: spoke with RN    Discussions with Specialists or Other Care Team Provider: spoke with GI, case mgmt and endocrinology    Education and Discussions with Family / Patient: called and left message for patient's son    Time Spent for Care: 28 minutes  More than 50% of total time spent on counseling and coordination of care as described above  Current Length of Stay: 6 day(s)    Current Patient Status: Inpatient   Certification Statement: The patient will continue to require additional inpatient hospital stay due to 24 hour urine     Discharge Plan: home when medically stable ? 24 hours    Code Status: Level 1 - Full Code    Subjective:   Patient noted to be much more interactive in [place] today  No nausea/vomiting but says she STILL has not had a BM and feels VERY gassy  No shortness of breath or chest pain  No other new events or concerns reported overnight  Objective:     Vitals:   Temp (24hrs), Av 5 °F (36 9 °C), Min:97 4 °F (36 3 °C), Max:99 °F (37 2 °C)    Temp:  [97 4 °F (36 3 °C)-99 °F (37 2 °C)] 98 6 °F (37 °C)  HR:  [69-80] 73  Resp:  [16-18] 16  BP: (116-174)/(57-84) 132/57  SpO2:  [90 %-96 %] 95 %  There is no height or weight on file to calculate BMI  Input and Output Summary (last 24 hours): Intake/Output Summary (Last 24 hours) at 3/8/2022 1114  Last data filed at 3/8/2022 3608  Gross per 24 hour   Intake 480 ml   Output 980 ml   Net -500 ml     Physical Exam:     Physical Exam  Vitals reviewed  Constitutional:       General: She is not in acute distress  Appearance: She is obese  She is not ill-appearing, toxic-appearing or diaphoretic  Comments: Sitting up in chair, comfortably   Eyes:      General: No scleral icterus  Right eye: No discharge  Left eye: No discharge  Conjunctiva/sclera: Conjunctivae normal    Cardiovascular:      Rate and Rhythm: Normal rate and regular rhythm  Pulmonary:      Effort: No respiratory distress  Breath sounds: No stridor  No wheezing or rhonchi  Comments: No dyspnea, tachypnea, wheeze, cough, distress  Not requiring oxygen  Abdominal:      General: There is no distension  Palpations: Abdomen is soft  Tenderness:  There is no abdominal tenderness  There is no guarding  Comments: Very active bowel sounds  Obese soft abdomen non TTP   Musculoskeletal:      Right lower leg: No edema  Left lower leg: No edema  Skin:     General: Skin is warm and dry  Coloration: Skin is not jaundiced or pale  Findings: No bruising, erythema, lesion or rash  Neurological:      General: No focal deficit present  Mental Status: She is alert  Mental status is at baseline  Comments: Awake alert interactive   Psychiatric:         Mood and Affect: Mood normal          Thought Content: Thought content normal        Additional Data:     Labs:    Results from last 7 days   Lab Units 03/08/22  0508   WBC Thousand/uL 10 90*   HEMOGLOBIN g/dL 12 5   HEMATOCRIT % 38 0   PLATELETS Thousands/uL 279   NEUTROS PCT % 58   LYMPHS PCT % 28   MONOS PCT % 10   EOS PCT % 2     Results from last 7 days   Lab Units 03/08/22  0508 03/06/22  0640 03/05/22  0624   SODIUM mmol/L 140   < > 136   POTASSIUM mmol/L 3 7   < > 3 7   CHLORIDE mmol/L 104   < > 102   CO2 mmol/L 29   < > 26   BUN mg/dL 27*   < > 44*   CREATININE mg/dL 1 31*   < > 1 60*   ANION GAP mmol/L 7   < > 8   CALCIUM mg/dL 8 9   < > 9 5   ALBUMIN g/dL  --   --  3 3*   TOTAL BILIRUBIN mg/dL  --   --  1 99*   ALK PHOS U/L  --   --  65   ALT U/L  --   --  22   AST U/L  --   --  14   GLUCOSE RANDOM mg/dL 132   < > 139    < > = values in this interval not displayed       Results from last 7 days   Lab Units 03/02/22  0951   INR  1 02     Results from last 7 days   Lab Units 03/08/22  1048 03/08/22  0655 03/08/22  0203 03/07/22  2040 03/07/22  1548 03/07/22  1135 03/07/22  0710 03/06/22  2052 03/06/22  1630 03/06/22  1139 03/06/22  0815 03/05/22  2112   POC GLUCOSE mg/dl 241* 134 163* 252* 204* 298* 160* 265* 244* 422* 285* 259*     Results from last 7 days   Lab Units 03/02/22  0951   HEMOGLOBIN A1C % 9 6*     Results from last 7 days   Lab Units 03/05/22  0624 03/04/22  0456 03/03/22  1156 03/02/22  1806 03/02/22  1545 03/02/22  0951 03/02/22  0547   LACTIC ACID mmol/L  --   --   --  2 0 3 1* 2 1* 2 3*   PROCALCITONIN ng/ml 0 23 0 22 0 13  --   --   --   --      * I Have Reviewed All Lab Data Listed Above  * Additional Pertinent Lab Tests Reviewed: Frances 68 Admission Reviewed    Imaging:    Imaging Reports Reviewed Today Include:   Imaging Personally Reviewed by Myself Includes:      Recent Cultures (last 7 days):     Results from last 7 days   Lab Units 03/02/22  0630 03/02/22  0612 03/02/22  0547   BLOOD CULTURE   --  No Growth After 5 Days  No Growth After 5 Days     URINE CULTURE  No Growth <1000 cfu/mL  --   --        Last 24 Hours Medication List:   Current Facility-Administered Medications   Medication Dose Route Frequency Provider Last Rate    bisacodyl  10 mg Rectal Daily PRN Galina Melendrez PA-C      carvedilol  25 mg Oral BID With Meals Cayla Daly PA-C      chlorthalidone  12 5 mg Oral Daily Rylee Le MD      famotidine  20 mg Oral BID Cayla Daly PA-C      insulin glargine  24 Units Subcutaneous HS Aayush Hou MD      insulin lispro  1-5 Units Subcutaneous HS [address], MD      insulin lispro  1-5 Units Subcutaneous 0200 Aayush Hou MD      insulin lispro  1-6 Units Subcutaneous TID AC [address] MD      insulin lispro  7 Units Subcutaneous TID With Meals [address] MD      lisinopril  10 mg Oral HS [facility], AMIRAH      metoclopramide  10 mg Intravenous Q6H PRN Braulio Cartagena MD      metoclopramide  5 mg Oral TID AC Galina Melendrez PA-C      NIFEdipine  90 mg Oral Daily Rylee Le MD      ondansetron  4 mg Oral Q6H PRN Braulio Cartagena MD      pantoprazole  40 mg Oral BID AC Braulio Cartagena MD      polyethylene glycol  17 g Oral Daily Braulio Cartagena MD      pravastatin  20 mg Oral Daily With [employer]      rivaroxaban  20 mg Oral Daily With Breakfast Galina Melendrez PA-C      senna-docusate sodium  2 tablet Oral BID Galina Melendrez PA-C      simethicone  80 mg Oral Q6H PRN Ancelmo Houston MD      sucralfate  1 g Oral Q6H [facility] Celeste Dasilva PA-C          Today, Patient Was Seen By: Celeste Dasilva PA-C    ** Please Note: Dictation voice to text software may have been used in the creation of this document   **

## 2022-03-08 NOTE — ASSESSMENT & PLAN NOTE
· Profoundly elevated blood pressure on admission at 270/133, with associated nonspecific EKG changes  · BP now much improved  · Continue home dose of Coreg 25 mg p o  BID; nifedipine 90 mg daily, chlorthalidone 12 5 mg daily  Lisinopril 10 mg daily also added  · Secondary HTN work up in process  · Appreciate endocrine and renal input  · 24 hour urine for catecholemines was initiated this morning at 6:00 a m  However, there is a risk of being inaccurate in the hospital setting and this may require the test to be repeated at home or we can consider discontinuing the test if she is going home today per my discussion with endocrinology    Serum catecholemines in process

## 2022-03-08 NOTE — ASSESSMENT & PLAN NOTE
· BNP elevated on admission    No previous levels on file to compare, will check echocardiogram today if able given mild pulmonary edema on cxr and severe HTN

## 2022-03-08 NOTE — ASSESSMENT & PLAN NOTE
· Nephrology following  Patient likely has element CKD stage 3    Increased creatinine to 1 6 on 3/4/22 likely from contrast nephropathy from CTA and addition of ACE-inhibitor  · Currently creatinine is stable with s Cr 1 31  · Continue to monitor renal function  · Avoid nephrotoxic agents and hypotension  · Follow creatinine with addition of ACEI

## 2022-03-08 NOTE — PROGRESS NOTES
Patient assisted to bedside chair for breakfast  Reports a lot of gas and stomach gurgling  Denies any pain or nausea at this time  Patient given miralax and senna for constipation  Will continue to monitor   Coni Dennis RN

## 2022-03-08 NOTE — PLAN OF CARE
Problem: Potential for Falls  Goal: Patient will remain free of falls  Description: INTERVENTIONS:  - Educate patient/family on patient safety including physical limitations  - Instruct patient to call for assistance with activity   - Consult OT/PT to assist with strengthening/mobility   - Keep Call bell within reach  - Keep bed low and locked with side rails adjusted as appropriate  - Keep care items and personal belongings within reach  - Initiate and maintain comfort rounds  - Make Fall Risk Sign visible to staff  - Offer Toileting every 2 Hours, in advance of need  - Initiate/Maintain alarm  - Obtain necessary fall risk management equipment:   - Apply yellow socks and bracelet for high fall risk patients  - Consider moving patient to room near nurses station  Outcome: Progressing     Problem: MOBILITY - ADULT  Goal: Maintain or return to baseline ADL function  Description: INTERVENTIONS:  -  Assess patient's ability to carry out ADLs; assess patient's baseline for ADL function and identify physical deficits which impact ability to perform ADLs (bathing, care of mouth/teeth, toileting, grooming, dressing, etc )  - Assess/evaluate cause of self-care deficits   - Assess range of motion  - Assess patient's mobility; develop plan if impaired  - Assess patient's need for assistive devices and provide as appropriate  - Encourage maximum independence but intervene and supervise when necessary  - Involve family in performance of ADLs  - Assess for home care needs following discharge   - Consider OT consult to assist with ADL evaluation and planning for discharge  - Provide patient education as appropriate  Outcome: Progressing  Goal: Maintains/Returns to pre admission functional level  Description: INTERVENTIONS:  - Perform BMAT or MOVE assessment daily    - Set and communicate daily mobility goal to care team and patient/family/caregiver     - Collaborate with rehabilitation services on mobility goals if consulted  - Perform Range of Motion 3 times a day  - Reposition patient every 2 hours  - Dangle patient 3 times a day  - Stand patient 3 times a day  - Ambulate patient 3 times a day  - Out of bed to chair 3 times a day   - Out of bed for meals 3 times a day  - Out of bed for toileting  - Record patient progress and toleration of activity level   Outcome: Progressing     Problem: Nutrition/Hydration-ADULT  Goal: Nutrient/Hydration intake appropriate for improving, restoring or maintaining nutritional needs  Description: Monitor and assess patient's nutrition/hydration status for malnutrition  Collaborate with interdisciplinary team and initiate plan and interventions as ordered  Monitor patient's weight and dietary intake as ordered or per policy  Utilize nutrition screening tool and intervene as necessary  Determine patient's food preferences and provide high-protein, high-caloric foods as appropriate       INTERVENTIONS:  - Monitor oral intake, urinary output, labs, and treatment plans  - Assess nutrition and hydration status and recommend course of action  - Evaluate amount of meals eaten  - Assist patient with eating if necessary   - Allow adequate time for meals  - Recommend/ encourage appropriate diets, oral nutritional supplements, and vitamin/mineral supplements  - Order, calculate, and assess calorie counts as needed  - Recommend, monitor, and adjust tube feedings and TPN/PPN based on assessed needs  - Assess need for intravenous fluids  - Provide specific nutrition/hydration education as appropriate  - Include patient/family/caregiver in decisions related to nutrition  Outcome: Progressing     Problem: Prexisting or High Potential for Compromised Skin Integrity  Goal: Skin integrity is maintained or improved  Description: INTERVENTIONS:  - Identify patients at risk for skin breakdown  - Assess and monitor skin integrity  - Assess and monitor nutrition and hydration status  - Monitor labs   - Assess for incontinence - Turn and reposition patient  - Assist with mobility/ambulation  - Relieve pressure over bony prominences  - Avoid friction and shearing  - Provide appropriate hygiene as needed including keeping skin clean and dry  - Evaluate need for skin moisturizer/barrier cream  - Collaborate with interdisciplinary team   - Patient/family teaching  - Consider wound care consult   Outcome: Progressing     Problem: PAIN - ADULT  Goal: Verbalizes/displays adequate comfort level or baseline comfort level  Description: Interventions:  - Encourage patient to monitor pain and request assistance  - Assess pain using appropriate pain scale  - Administer analgesics based on type and severity of pain and evaluate response  - Implement non-pharmacological measures as appropriate and evaluate response  - Consider cultural and social influences on pain and pain management  - Notify physician/advanced practitioner if interventions unsuccessful or patient reports new pain  Outcome: Progressing     Problem: INFECTION - ADULT  Goal: Absence or prevention of progression during hospitalization  Description: INTERVENTIONS:  - Assess and monitor for signs and symptoms of infection  - Monitor lab/diagnostic results  - Monitor all insertion sites, i e  indwelling lines, tubes, and drains  - Monitor endotracheal if appropriate and nasal secretions for changes in amount and color  - [place] appropriate cooling/warming therapies per order  - Administer medications as ordered  - Instruct and encourage patient and family to use good hand hygiene technique  - Identify and instruct in appropriate isolation precautions for identified infection/condition  Outcome: Progressing  Goal: Absence of fever/infection during neutropenic period  Description: INTERVENTIONS:  - Monitor WBC    Outcome: Progressing

## 2022-03-08 NOTE — TELEPHONE ENCOUNTER
Tried to call pt to schedule a follow up appt and the pt's phone number does not work   I called her son's number and no answer

## 2022-03-08 NOTE — PROGRESS NOTES
NEPHROLOGY PROGRESS NOTE   Shara Addison 78 y o  female MRN: [id number]  Unit/Bed#: S -01 Encounter: [id number]  Reason for Consult: HTN/CKD    ASSESSMENT/PLAN:  1  Hypertensive Emergency- /133 on admission  - workup: renal artery duplex negative for MADDIE, renin __, aldosterone __, metanephrines __, catecholamines __, tsh 1 23, random cortisol 35, am cortisol 4 7 (see #6)  - BP now improved and stable  - currently on coreg 25mg BID, chlorthalidone 12 5mg daily, nifedipine 90mg daily, lisinopril 10mg QHS  - consider spironolactone instead of chlorthalidone  - avoid salt  2  Elevated Creatinine / Suspected Chronic Kidney Disease stage 3- suspected baseline creatinine is around the mid 1s  - noted CTA on 3/2  - imaging: no hydronephrosis  - UA: + glucose, 1+ ketones, small blood, >300 protein  - UPC ratio is 0 31  - creatinine stable in the low 1s  - recently moved from [place], will need office follow up on discharge  3  Hypokalemia- replete as needed  - currently within normal range  4  Hyponatremia- monitor, acceptable at this time  5  Diabetes mellitus type 2 with DKA- endocrinology and primary team managing  6  Left Adrenal Nodule- endocrinology on board  - 24 hour urinary cortisol, catecholamines, and metanephrines in process per endocrinology  7  Nausea/Vomiting/Abdominal Pain- per GI  - s/p EGD 3/7 showing mild gastritis  - concern for diabetic gastroparesis/delayed emptying    Disposition:  Stable from a renal standpoint  Lisinopril added at bedtime  SUBJECTIVE:  Patient feeling well  Denies SOB, N/V/D but states her stomach is not right yet      OBJECTIVE:  Current Weight: Weight - Scale: 81 6 kg (180 lb)  Vitals:    03/07/22 2207 03/08/22 0205 03/08/22 0656 03/08/22 0745   BP: 128/68 141/70 116/76    BP Location:  Right arm Right arm    Pulse: 69 78 76    Resp: 17 18 17    Temp: 98 2 °F (36 8 °C) 98 9 °F (37 2 °C) 99 °F (37 2 °C)    TempSrc:  Axillary Oral    SpO2: 93% 90% 96% 90% Weight:           Intake/Output Summary (Last 24 hours) at 3/8/2022 1003  Last data filed at 3/8/2022 8754  Gross per 24 hour   Intake 480 ml   Output 980 ml   Net -500 ml     General: NAD  Skin: no rash  Eyes: anicteric  ENMT: mm moist  Neck: no masses  Respiratory: CTAB  Cardiac: RRR  Extremities: no edema  GI: soft nt nd  Neuro: alert awake  Psych: mood and affect appropriate    Medications:    Current Facility-Administered Medications:     bisacodyl (DULCOLAX) rectal suppository 10 mg, 10 mg, Rectal, Daily PRN, Galina Melendrez PA-C    carvedilol (COREG) tablet 25 mg, 25 mg, Oral, BID With Meals, Hermelindo Andrade PA-C, 25 mg at 03/08/22 0913    chlorthalidone tablet 12 5 mg, 12 5 mg, Oral, Daily, Beba Mclean MD, 12 5 mg at 03/06/22 1121    famotidine (PEPCID) tablet 20 mg, 20 mg, Oral, BID, Hermelindo Andrade PA-C, 20 mg at 03/08/22 0903    insulin glargine (LANTUS) subcutaneous injection 24 Units 0 24 mL, 24 Units, Subcutaneous, HS, Reilly Lara MD, 24 Units at 03/07/22 2121    insulin lispro (HumaLOG) 100 units/mL subcutaneous injection 1-5 Units, 1-5 Units, Subcutaneous, HS, Nalini Murray MD, 2 Units at 03/07/22 2118    insulin lispro (HumaLOG) 100 units/mL subcutaneous injection 1-5 Units, 1-5 Units, Subcutaneous, 0200, Reilly Lara MD, 1 Units at 03/08/22 0205    insulin lispro (HumaLOG) 100 units/mL subcutaneous injection 1-6 Units, 1-6 Units, Subcutaneous, TID AC, 2 Units at 03/07/22 1812 **AND** Fingerstick Glucose (POCT), , , TID AC, Nalini Murray MD    insulin lispro (HumaLOG) 100 units/mL subcutaneous injection 7 Units, 7 Units, Subcutaneous, TID With Meals, Nalini Murray MD, 7 Units at 03/08/22 0905    lisinopril (ZESTRIL) tablet 10 mg, 10 mg, Oral, HS, Aquiles White, AMIRAH    metoclopramide (REGLAN) injection 10 mg, 10 mg, Intravenous, Q6H PRN, Erlin Craig MD    metoclopramide (REGLAN) tablet 5 mg, 5 mg, Oral, TID AC, Galina Melendrez PA-C, 5 mg at 03/08/22 0611   NIFEdipine (PROCARDIA XL) 24 hr tablet 90 mg, 90 mg, Oral, Daily, Joe Jasso MD, 90 mg at 03/06/22 0819    ondansetron (ZOFRAN-ODT) dispersible tablet 4 mg, 4 mg, Oral, Q6H PRN, Jaleesa Acosta MD, 4 mg at 03/06/22 1122    pantoprazole (PROTONIX) EC tablet 40 mg, 40 mg, Oral, BID AC, Jaleesa Acosta MD, 40 mg at 03/08/22 6296    polyethylene glycol (MIRALAX) packet 17 g, 17 g, Oral, Daily, Jaleesa Acosta MD, 17 g at 03/08/22 9942    pravastatin (PRAVACHOL) tablet 20 mg, 20 mg, Oral, Daily With Maxwell Melendrez PA-C, 20 mg at 03/07/22 1609    rivaroxaban (XARELTO) tablet 20 mg, 20 mg, Oral, Daily With Breakfast, Galina Melendrez PA-C, 20 mg at 03/08/22 0913    senna-docusate sodium (SENOKOT S) 8 6-50 mg per tablet 2 tablet, 2 tablet, Oral, BID, Galina Melendrez PA-C, 2 tablet at 03/08/22 0902    simethicone (MYLICON) chewable tablet 80 mg, 80 mg, Oral, Q6H PRN, Jaleesa Acosta MD    sucralfate (CARAFATE) tablet 1 g, 1 g, Oral, Q6H ROM, Galina Melendrez PA-C, 1 g at 03/08/22 0692    Laboratory Results:  Results from last 7 days   Lab Units 03/08/22  0508 03/07/22  0518 03/06/22  0640 03/05/22  3307 03/04/22  0456 03/03/22  0551 03/03/22  0145 03/02/22  0612 03/02/22  0547   WBC Thousand/uL 10 90* 10 68* 11 97* 14 33* 20 32* 17 20*  --   --  14 84*   HEMOGLOBIN g/dL 12 5 12 1 13 0 13 2 13 8 13 8  --   --  15 1   HEMATOCRIT % 38 0 36 4 38 9 38 3 39 5 39 7  --   --  43 2   PLATELETS Thousands/uL 279 299 281 265 281 288  --   --  293   POTASSIUM mmol/L 3 7 3 4* 3 8 3 7 3 3* 3 5 3 2*   < >  --    CHLORIDE mmol/L 104 103 96* 102 101 103 102   < >  --    CO2 mmol/L 29 30 25 26 27 29 29   < >  --    BUN mg/dL 27* 30* 42* 44* 35* 25 23   < >  --    CREATININE mg/dL 1 31* 1 41* 1 65* 1 60* 1 61* 1 43* 1 43*   < >  --    CALCIUM mg/dL 8 9 8 9 9 6 9 5 9 4 9 4 9 3   < >  --    MAGNESIUM mg/dL  --   --   --  2 3  --   --   --   --   --     < > = values in this interval not displayed       I have personally reviewed the blood work as stated above and in my note  I reviewed endocrinology note and SLIM note

## 2022-03-08 NOTE — ASSESSMENT & PLAN NOTE
· On xarelto but initially indication was unclear    In conversation using Cyps , patient reported history of atrial fibrillation  · We held Xarelto temporarily in case need for any invasive interventions, but she was not noted to have any events of bleeding and per my conversation with GI we resumed Xarelto

## 2022-03-08 NOTE — ASSESSMENT & PLAN NOTE
Lab Results   Component Value Date    HGBA1C 9 6 (H) 03/02/2022       Recent Labs     03/07/22  1548 03/07/22  2040 03/08/22  0203 03/08/22  0655   POCGLU 204* 252* 163* 134     Blood Sugar Average: Last 72 hrs:  (P) 216 [id number]   · Patient presented with nausea, vomiting, epigastric pain and was found to have severe hyperglycemia  Mildly elevated anion gap and mildly elevated beta hydroxybutyrate  · Stopped oral hypoglycemic agents including Amaryl, metformin and Januvia  Reportedly issues with noncompliance  · Resolved  Endocrinology consulted, appreciate recommendations  · Patient transitioned off the insulin gtt and converted to basal/bolus regimen  Endocrinology is following  She will need adjustments to her regimen and determination of what doses to remain on at discharge    Her anion gap and bicarb are normal

## 2022-03-08 NOTE — PROGRESS NOTES
Patient given medication as ordered  Patient has no complaints at this time  Patient is having Echo done currently  Patient given call bell and encouraged to call if need anything  Will continue to monitor

## 2022-03-08 NOTE — PROGRESS NOTES
Progress Note - Haris Zamorano 78 y o  female MRN: [id number]    Unit/Bed#: S -01 Encounter: [id number]      CC: Diabetes management    Subjective:   Haris Zamorano is a 78y o  year old female with type 2 diabetes, hypertension presenting with abdominal pain and nausea  Today denies abdominal pain or nausea/vomiting  Denies dizziness, headaches  Reports feeling gassy and mentioned not having a BM since admission, constipation managed by the primary care  Objective:     Vitals: Blood pressure 151/74, pulse 81, temperature 98 2 °F (36 8 °C), temperature source Oral, resp  rate 16, weight 81 6 kg (180 lb), SpO2 99 %  ,There is no height or weight on file to calculate BMI  Physical Exam:  General Appearance: awake, appears stated age and cooperative  Head: Normocephalic, without obvious abnormality, atraumatic  Extremities: moves all extremities  Skin: Skin color and temperature normal    Pulm: no labored breathing  Abdomen: obese non tender    Lab, Imaging and other studies: I have personally reviewed pertinent reports  Assessment:    Lab Results   Component Value Date    POCGLU 241 (H) 03/08/2022    POCGLU 134 03/08/2022    POCGLU 163 (H) 03/08/2022    POCGLU 252 (H) 03/07/2022    POCGLU 204 (H) 03/07/2022      Patient with currently basal-bolus: insuline Glargine 24 U and lispro 7 U, 3 x before meals  Blood glucose stable today  Patient reported eating breakfast and lunch      Plan:  Continue long acting insulin to 24 U   Will increase  short acting to 9 un         2 Hypertensive emergency:   Concern  for Cushing syndrome  · CT abdomen revealed 2 6 x 2 1 cm fat density left adrenal nodule compatible with myelolipoma  · Night cortisol- 35 elevated  ·  Dexamethasone suppression test done yesterday with morning cortisol elevation  Plan:  Follow up on 24 urine free cortisol, metanephrines and catecholamines        3  Abdominal pain/Nausea/Vomiting  · Patient admitted with acute abdominal pain, nausea, vomiting  Images unremarkable for acute inflammation of the pancreas, no biliary stones,   · EGD mild gastritis  · Concern for gastroparesis secondarily to diabetes less likely but not completely ruled out  Can be considered gastric emptying study in the outpatient setting  · GI on board  ·   Portions of the record may have been created with voice recognition software  Occasional wrong word or "sound a like" substitutions may have occurred due to the inherent limitations of voice recognition software  Read the chart carefully and recognize, using context, where substitutions have occurred

## 2022-03-09 VITALS
HEART RATE: 83 BPM | SYSTOLIC BLOOD PRESSURE: 148 MMHG | OXYGEN SATURATION: 95 % | DIASTOLIC BLOOD PRESSURE: 86 MMHG | WEIGHT: 180 LBS | RESPIRATION RATE: 16 BRPM | TEMPERATURE: 98.2 F

## 2022-03-09 LAB
ANION GAP SERPL CALCULATED.3IONS-SCNC: 7 MMOL/L (ref 4–13)
BUN SERPL-MCNC: 21 MG/DL (ref 5–25)
CALCIUM SERPL-MCNC: 9.2 MG/DL (ref 8.3–10.1)
CHLORIDE SERPL-SCNC: 104 MMOL/L (ref 100–108)
CO2 SERPL-SCNC: 30 MMOL/L (ref 21–32)
CREAT SERPL-MCNC: 1.31 MG/DL (ref 0.6–1.3)
GFR SERPL CREATININE-BSD FRML MDRD: 40 ML/MIN/1.73SQ M
GLUCOSE SERPL-MCNC: 114 MG/DL (ref 65–140)
GLUCOSE SERPL-MCNC: 120 MG/DL (ref 65–140)
GLUCOSE SERPL-MCNC: 225 MG/DL (ref 65–140)
GLUCOSE SERPL-MCNC: 240 MG/DL (ref 65–140)
GLUCOSE SERPL-MCNC: 98 MG/DL (ref 65–140)
METANEPH FREE SERPL-MCNC: 33.6 PG/ML (ref 0–88)
NORMETANEPHRINE SERPL-MCNC: 254.9 PG/ML (ref 0–285.2)
POTASSIUM SERPL-SCNC: 3.6 MMOL/L (ref 3.5–5.3)
SODIUM SERPL-SCNC: 141 MMOL/L (ref 136–145)

## 2022-03-09 PROCEDURE — 80048 BASIC METABOLIC PNL TOTAL CA: CPT | Performed by: PHYSICIAN ASSISTANT

## 2022-03-09 PROCEDURE — 82948 REAGENT STRIP/BLOOD GLUCOSE: CPT

## 2022-03-09 PROCEDURE — 83835 ASSAY OF METANEPHRINES: CPT | Performed by: INTERNAL MEDICINE

## 2022-03-09 PROCEDURE — 82530 CORTISOL FREE: CPT | Performed by: INTERNAL MEDICINE

## 2022-03-09 PROCEDURE — 99239 HOSP IP/OBS DSCHRG MGMT >30: CPT | Performed by: PHYSICIAN ASSISTANT

## 2022-03-09 PROCEDURE — RECHECK: Performed by: PHYSICIAN ASSISTANT

## 2022-03-09 PROCEDURE — 82384 ASSAY THREE CATECHOLAMINES: CPT | Performed by: INTERNAL MEDICINE

## 2022-03-09 PROCEDURE — 99232 SBSQ HOSP IP/OBS MODERATE 35: CPT | Performed by: INTERNAL MEDICINE

## 2022-03-09 RX ORDER — INSULIN GLARGINE 100 [IU]/ML
24 INJECTION, SOLUTION SUBCUTANEOUS
Qty: 15 ML | Refills: 0 | Status: SHIPPED | OUTPATIENT
Start: 2022-03-09 | End: 2022-03-10 | Stop reason: HOSPADM

## 2022-03-09 RX ORDER — INSULIN GLARGINE 100 [IU]/ML
24 INJECTION, SOLUTION SUBCUTANEOUS
Qty: 15 ML | Refills: 0 | Status: CANCELLED | OUTPATIENT
Start: 2022-03-09

## 2022-03-09 RX ORDER — SUCRALFATE 1 G/1
1 TABLET ORAL EVERY 6 HOURS SCHEDULED
Qty: 56 TABLET | Refills: 0 | Status: SHIPPED | OUTPATIENT
Start: 2022-03-09

## 2022-03-09 RX ORDER — LISINOPRIL 10 MG/1
10 TABLET ORAL
Qty: 30 TABLET | Refills: 0 | Status: SHIPPED | OUTPATIENT
Start: 2022-03-09

## 2022-03-09 RX ORDER — SIMETHICONE 80 MG
80 TABLET,CHEWABLE ORAL EVERY 6 HOURS PRN
Qty: 30 TABLET | Refills: 0 | Status: SHIPPED | OUTPATIENT
Start: 2022-03-09

## 2022-03-09 RX ORDER — POLYETHYLENE GLYCOL 3350 17 G/17G
17 POWDER, FOR SOLUTION ORAL DAILY
Refills: 0
Start: 2022-03-10 | End: 2022-03-09

## 2022-03-09 RX ORDER — PANTOPRAZOLE SODIUM 40 MG/1
40 TABLET, DELAYED RELEASE ORAL
Qty: 60 TABLET | Refills: 0 | Status: SHIPPED | OUTPATIENT
Start: 2022-03-09

## 2022-03-09 RX ORDER — CHLORTHALIDONE 25 MG/1
12.5 TABLET ORAL DAILY
Qty: 15 TABLET | Refills: 0 | Status: SHIPPED | OUTPATIENT
Start: 2022-03-10

## 2022-03-09 RX ORDER — METOCLOPRAMIDE 5 MG/1
5 TABLET ORAL
Qty: 60 TABLET | Refills: 0 | Status: SHIPPED | OUTPATIENT
Start: 2022-03-09

## 2022-03-09 RX ORDER — BLOOD-GLUCOSE METER
KIT MISCELLANEOUS
Qty: 1 KIT | Refills: 0 | Status: SHIPPED | OUTPATIENT
Start: 2022-03-09

## 2022-03-09 RX ORDER — POLYETHYLENE GLYCOL 3350 17 G/17G
17 POWDER, FOR SOLUTION ORAL DAILY PRN
Refills: 0
Start: 2022-03-09

## 2022-03-09 RX ORDER — FAMOTIDINE 20 MG/1
20 TABLET, FILM COATED ORAL 2 TIMES DAILY
Qty: 30 TABLET | Refills: 0 | Status: SHIPPED | OUTPATIENT
Start: 2022-03-09

## 2022-03-09 RX ORDER — PEN NEEDLE, DIABETIC 32GX 5/32"
NEEDLE, DISPOSABLE MISCELLANEOUS
Qty: 100 EACH | Refills: 0 | Status: SHIPPED | OUTPATIENT
Start: 2022-03-09

## 2022-03-09 RX ORDER — GLUCOSAMINE HCL/CHONDROITIN SU 500-400 MG
CAPSULE ORAL
Qty: 100 EACH | Refills: 0 | Status: SHIPPED | OUTPATIENT
Start: 2022-03-09

## 2022-03-09 RX ORDER — AMOXICILLIN 250 MG
2 CAPSULE ORAL 2 TIMES DAILY
Qty: 60 TABLET | Refills: 0 | Status: SHIPPED | OUTPATIENT
Start: 2022-03-09 | End: 2022-03-09 | Stop reason: HOSPADM

## 2022-03-09 RX ORDER — BLOOD SUGAR DIAGNOSTIC
STRIP MISCELLANEOUS
Qty: 100 EACH | Refills: 0 | Status: SHIPPED | OUTPATIENT
Start: 2022-03-09

## 2022-03-09 RX ORDER — NIFEDIPINE 30 MG/1
90 TABLET, EXTENDED RELEASE ORAL DAILY
Qty: 90 TABLET | Refills: 0 | Status: SHIPPED | OUTPATIENT
Start: 2022-03-10

## 2022-03-09 RX ORDER — LANCETS 33 GAUGE
EACH MISCELLANEOUS
Qty: 100 EACH | Refills: 0 | Status: SHIPPED | OUTPATIENT
Start: 2022-03-09

## 2022-03-09 RX ORDER — INSULIN ASPART 100 [IU]/ML
9 INJECTION, SOLUTION INTRAVENOUS; SUBCUTANEOUS
Qty: 15 ML | Refills: 0 | Status: CANCELLED | OUTPATIENT
Start: 2022-03-09

## 2022-03-09 RX ADMIN — SIMETHICONE 80 MG: 80 TABLET, CHEWABLE ORAL at 14:23

## 2022-03-09 RX ADMIN — INSULIN LISPRO 9 UNITS: 100 INJECTION, SOLUTION INTRAVENOUS; SUBCUTANEOUS at 09:29

## 2022-03-09 RX ADMIN — PANTOPRAZOLE SODIUM 40 MG: 40 TABLET, DELAYED RELEASE ORAL at 17:26

## 2022-03-09 RX ADMIN — PRAVASTATIN SODIUM 20 MG: 20 TABLET ORAL at 17:26

## 2022-03-09 RX ADMIN — CARVEDILOL 25 MG: 12.5 TABLET, FILM COATED ORAL at 17:26

## 2022-03-09 RX ADMIN — POLYETHYLENE GLYCOL 3350 17 G: 17 POWDER, FOR SOLUTION ORAL at 08:43

## 2022-03-09 RX ADMIN — CHLORTHALIDONE 12.5 MG: 25 TABLET ORAL at 09:26

## 2022-03-09 RX ADMIN — FAMOTIDINE 20 MG: 20 TABLET ORAL at 17:26

## 2022-03-09 RX ADMIN — INSULIN LISPRO 9 UNITS: 100 INJECTION, SOLUTION INTRAVENOUS; SUBCUTANEOUS at 17:31

## 2022-03-09 RX ADMIN — NIFEDIPINE 90 MG: 30 TABLET, FILM COATED, EXTENDED RELEASE ORAL at 08:44

## 2022-03-09 RX ADMIN — PANTOPRAZOLE SODIUM 40 MG: 40 TABLET, DELAYED RELEASE ORAL at 06:27

## 2022-03-09 RX ADMIN — RIVAROXABAN 20 MG: 20 TABLET, FILM COATED ORAL at 08:43

## 2022-03-09 RX ADMIN — CARVEDILOL 25 MG: 12.5 TABLET, FILM COATED ORAL at 08:43

## 2022-03-09 RX ADMIN — INSULIN LISPRO 9 UNITS: 100 INJECTION, SOLUTION INTRAVENOUS; SUBCUTANEOUS at 13:08

## 2022-03-09 RX ADMIN — METOCLOPRAMIDE 5 MG: 5 TABLET ORAL at 13:06

## 2022-03-09 RX ADMIN — SENNOSIDES AND DOCUSATE SODIUM 2 TABLET: 50; 8.6 TABLET ORAL at 17:26

## 2022-03-09 RX ADMIN — INSULIN LISPRO 2 UNITS: 100 INJECTION, SOLUTION INTRAVENOUS; SUBCUTANEOUS at 17:31

## 2022-03-09 RX ADMIN — METOCLOPRAMIDE 5 MG: 5 TABLET ORAL at 06:27

## 2022-03-09 RX ADMIN — FAMOTIDINE 20 MG: 20 TABLET ORAL at 08:44

## 2022-03-09 RX ADMIN — SENNOSIDES AND DOCUSATE SODIUM 2 TABLET: 50; 8.6 TABLET ORAL at 08:44

## 2022-03-09 RX ADMIN — SUCRALFATE 1 G: 1 TABLET ORAL at 01:00

## 2022-03-09 RX ADMIN — INSULIN LISPRO 3 UNITS: 100 INJECTION, SOLUTION INTRAVENOUS; SUBCUTANEOUS at 13:08

## 2022-03-09 RX ADMIN — METOCLOPRAMIDE 5 MG: 5 TABLET ORAL at 17:26

## 2022-03-09 RX ADMIN — SUCRALFATE 1 G: 1 TABLET ORAL at 17:26

## 2022-03-09 RX ADMIN — SUCRALFATE 1 G: 1 TABLET ORAL at 06:27

## 2022-03-09 RX ADMIN — SUCRALFATE 1 G: 1 TABLET ORAL at 13:06

## 2022-03-09 NOTE — ASSESSMENT & PLAN NOTE
· Patient brought in for abdominal pain with nausea and vomiting, Polish speaking only and provided minimal history but noted to be frequently belching and moaning on admission  Noted to have central abdominal TTP  CTA C/A/P on admission was unremarkable aside from mild fatty liver  Lab work with elevated total bilirubin but normal LFTs, as well as SIRS criteria  Right upper quadrant ultrasound- biliary ductal dilatation  Patient is status post cholecystectomy  · Patient was seen and evaluated by GI on 03/04, thought possibly viral gastroenteritis  Patient's symptoms were initially improving with supportive cares ie  IV fluids and antiemetics, however patient with worsening nausea and vomiting again over weekend and abdominal cramping/feeling bloated and gassy per the patient  · Suspect component of diabetic gastroparesis, will need outpatient gastric emptying study  · EGD performed only showed mild gastritis and biopsies were taken to rule out H pylori (pending)  · Continue around the clock low-dose Reglan before meals, pepcid, Protonix and Carafate   Prn gas X  · Continue ulcer free diet/small meals

## 2022-03-09 NOTE — DISCHARGE SUMMARY
[facility]  Discharge- Ashely Camacho 1945, 78 y o  female MRN: [id number]  Unit/Bed#: W -01 Encounter: [id number]  Primary Care Provider: Tavo Kent MD   Date and time admitted to hospital: 3/2/2022  5:21 AM    * Abdominal pain with nausea and vomiting  Assessment & Plan  · Patient brought in for abdominal pain with nausea and vomiting, Polish speaking only and provided minimal history but noted to be frequently belching and moaning on admission  Noted to have central abdominal TTP  CTA C/A/P on admission was unremarkable aside from mild fatty liver  Lab work with elevated total bilirubin but normal LFTs, as well as SIRS criteria  Right upper quadrant ultrasound- biliary ductal dilatation  Patient is status post cholecystectomy  · Patient was seen and evaluated by GI on 03/04, thought possibly viral gastroenteritis  Patient's symptoms were initially improving with supportive cares ie  IV fluids and antiemetics, however patient with worsening nausea and vomiting again over weekend and abdominal cramping/feeling bloated and gassy per the patient  · Suspect component of diabetic gastroparesis, will need outpatient gastric emptying study  · EGD performed only showed mild gastritis and biopsies were taken to rule out H pylori (pending)  · Continue around the clock low-dose Reglan before meals, pepcid, Protonix and Carafate  Prn gas X  · Continue ulcer free diet/small meals        DKA (diabetic ketoacidosis) [facility])  Assessment & Plan  Lab Results   Component Value Date    HGBA1C 9 6 (H) 03/02/2022       Recent Labs     03/08/22  2054 03/09/22  0414 03/09/22  0731 03/09/22  1107   POCGLU 171* 98 120 240*     Blood Sugar Average: Last 72 hrs:  (P) 264 0082   · Patient presented with nausea, vomiting, epigastric pain and was found to have severe hyperglycemia   Mildly elevated anion gap and mildly elevated beta hydroxybutyrate  · Stopped oral hypoglycemic agents including Amaryl, metformin and Januvia  Reportedly issues with noncompliance  · Resolved  Endocrinology consulted, appreciate recommendations  · Patient transitioned off the insulin gtt and converted to basal/bolus regimen  Endocrinology is following  Case discussed with them  · Ongoing adjustments have been made and I have confirmed with case management what tier 1 options are for meds to ensure affordability  · Patient is daughter-in-law is a nurse and they feel comfortable doing insulin for her    Hypertensive urgency  Assessment & Plan  · Profoundly elevated blood pressure on admission at 270/133, with associated nonspecific EKG changes  · BP was elevated transiently this morning but significantly improved on recheck and I reviewed the case with Nephrology who stated no additional medications would be adjusted at this time  · Continue home dose of Coreg 25 mg p o  BID; nifedipine 90 mg daily, chlorthalidone 12 5 mg daily  Lisinopril 10 mg daily also added  · Secondary HTN work up in process  · Appreciate endocrine and renal input  · 24 hour urine for catecholemines obtained with results pending  However, there is a risk of being inaccurate in the hospital setting and this may require the test to be repeated at home  Serum catecholemines in process    SIRS (systemic inflammatory response syndrome) (HCC)-resolved as of 3/6/2022  Assessment & Plan  · Patient with leukocytosis POA, 14-->17-->20; now improving with WBC count of 10 9  Stable despite dexamethasone  · Source was not definite, possible viral gastroenteritis (vs gastroparesis)  · Procalcitonin <0 25 x 3, reassuring- suggesting non infectious process  · tachycardia and lactic acidosis present on admission resolved  · Blood cultures negative at 72 hours  · Urine Cx negative  · CT C/A/P on admission negative  · Continue to monitor off antibiotics    Monitor clinically        Hypoxia  Assessment & Plan  · Mild hypoxia noted 3/6-3/7/22 with room air sat 87-88%  Presently much improved O2 sat on room air  Not offering any complaints of shortness of breath  · March 7th chest x-ray read as mild pulmonary edema, but given overall improvement would not push with diuretics at this time unless nephrology recommends  · Encourage more mobility and incentive spirometry  Acute kidney injury superimposed on chronic kidney disease [facility])  Assessment & Plan  · Nephrology following  Patient likely has element CKD stage 3  Increased creatinine to 1 6 on 3/4/22 likely from contrast nephropathy from CTA   · Currently creatinine is stable with s Cr 1 31  · Continue to monitor renal function outpatient  · Avoid nephrotoxic agents and hypotension  · Follow creatinine with addition of ACEI-thus far stable    Adrenal nodule ([facility] [address] )  Assessment & Plan  · Appreciate endocrinology--follow up    Constipation  Assessment & Plan  · Continue bowel regimen  · Outpatient colonoscopy    Hypokalemia  Assessment & Plan  · Repleted    Delusional disorder [facility])  Assessment & Plan  · Per history provided by son  Patient has had previous inpatient psychiatric stay in [place]    PAF (paroxysmal atrial fibrillation) ([facility] )  Assessment & Plan  · On xarelto but initially indication was unclear  In conversation using [employer] , patient reported history of atrial fibrillation  · We held Xarelto temporarily in case need for any invasive interventions, but she was not noted to have any events of bleeding and per my conversation with GI we resumed Xarelto    Elevated brain natriuretic peptide (BNP) level  Assessment & Plan  · BNP elevated on admission    No previous levels on file to compare,    · echocardiogram with grade 1 diastolic dysfunction and normal ejection fraction      Discharging Physician / Practitioner: Blanquita Leos PA-C  PCP: Rayna Sorto MD  Admission Date:   Admission Orders (From admission, onward)     Ordered        03/02/22 1213  [facility]  Once Discharge Date: 03/09/22    Medical Problems             Resolved Problems  Date Reviewed: 3/9/2022          Resolved    SIRS (systemic inflammatory response syndrome) ([facility] [address] ) 3/6/2022     Resolved by  Rohan Block MD              Consultations During Hospital Stay:  · Endocrinology  · GI  · Nephrology    Procedures Performed:   · EGD  · CT angiogram dissection protocol chest abdomen and pelvis  · Chest x-ray  · Renal artery vascular Doppler  · 2D echocardiogram  · Right upper quadrant ultrasound with liver Dopplers  · Head CT    Significant Findings / Test Results:   · As above    Incidental Findings:   · Adrenal nodule    Test Results Pending at Discharge (will require follow up): · Catecholamines in urine and serum  · Biopsy from EGD     Outpatient Tests Requested:  · BMP  · Gastric emptying study    Complications:      Reason for Admission:  Abdominal pain with nausea and vomiting    Hospital Course:     Erinn Jiménez is a 78 y o  female patient, who only speaks [place] and recently moved to this country with her son 3 months ago who originally presented to the hospital on 3/2/2022 due to abdominal pain with nausea and vomiting  On admission she was treated symptomatically with Protonix, Pepcid, antiemetics and IV fluids but it eventually was seen in consultation by GI given the persistent nature of her symptoms given lack of any abnormality seen on CT scan to explain  She underwent EGD which only showed mild gastritis  It was suspected that she probably had underlying diabetic gastroparesis as she was also noted to have mild DKA and very poorly controlled diabetes on admission  She was then seen by endocrinology  All of her oral hypoglycemic agents were discontinued and she was started on an insulin drip and eventually transitioned over to a basal bolus insulin regimen    Prior to discharge I confirmed with case management which options were tier 1 preferred with her insurance and also spoke with Endocrinology to confirm dosing  Patient also had SIRS criteria present on admission including leukocytosis but was never found to have any bacterial etiology or acute infection  The other major issue present on admission that persisted throughout the hospitalization was hypertensive urgency, with patient having been profoundly hypertensive on admission  Given difficulty achieving control her blood pressure, we involve Nephrology who also initiated a secondary hypertension workup and over the course of her stay, in addition to her home dose of Coreg 25 mg b i d , had to add Procardia 90 mg daily, lisinopril 10 mg daily, and chlorthalidone 12 5 mg daily to improve her blood pressure control  Additional studies for pheochromocytoma are still pending at the time of this discharge  She had evidence of mild acute kidney injury which stabilized and current labs are now consistent with chronic kidney disease  She was advised to follow-up with Nephrology and will need an outpatient BMP  A 2D echocardiogram also revealed grade 1 diastolic dysfunction  She was ambulating independently and did not require any rehab but was offered VNA which patient's s declined as his wife is a nurse and he feels can meet the patient's needs  Additional workup will be necessary as an outpatient including a gastric emptying study but in the meantime patient seems to have responded favorably to a trial of around the clock Reglan in addition to the other GI medications provided  Please see above list of diagnoses and related plan for additional information  Condition at Discharge: stable     Discharge Day Visit / Exam:     * Please refer to separate progress note for these details *    Discussion with Family:  3:18 p m  Spoke with patient's son--he states he has to pick the patient up today and is not available to take her home tomorrow    He reassured me his wife is a nurse and able to manage the blood pressure and blood sugars and he declined VNA    Discharge instructions/Information to patient and family:   See after visit summary for information provided to patient and family  Provisions for Follow-Up Care:  See after visit summary for information related to follow-up care and any pertinent home health orders  Disposition:  Home    Planned Readmission: none     Discharge Statement:  I spent 45 minutes discharging the patient  This time was spent on the day of discharge  I had direct contact with the patient on the day of discharge  Greater than 50% of the total time was spent examining patient, answering all patient questions, arranging and discussing plan of care with patient as well as directly providing post-discharge instructions  Additional time then spent on discharge activities  Case was discussed with endocrinology, case management, Nephrology, and nursing who reports that patient after having received laxatives is having now liquidy BMs    Discharge Medications:  See after visit summary for reconciled discharge medications provided to patient and family        ** Please Note: This note has been constructed using a voice recognition system **

## 2022-03-09 NOTE — ASSESSMENT & PLAN NOTE
Lab Results   Component Value Date    HGBA1C 9 6 (H) 03/02/2022       Recent Labs     03/08/22 2054 03/09/22  0414 03/09/22  0731 03/09/22  1107   POCGLU 171* 98 120 240*     Blood Sugar Average: Last 72 hrs:  (P) 245 3574   · Patient presented with nausea, vomiting, epigastric pain and was found to have severe hyperglycemia  Mildly elevated anion gap and mildly elevated beta hydroxybutyrate  · Stopped oral hypoglycemic agents including Amaryl, metformin and Januvia  Reportedly issues with noncompliance  · Resolved  Endocrinology consulted, appreciate recommendations  · Patient transitioned off the insulin gtt and converted to basal/bolus regimen  Endocrinology is following    · Ongoing adjustments have been made and I have confirmed with case management what tier 1 options are for meds to ensure affordability

## 2022-03-09 NOTE — ASSESSMENT & PLAN NOTE
Lab Results   Component Value Date    HGBA1C 9 6 (H) 03/02/2022       Recent Labs     03/08/22 2054 03/09/22  0414 03/09/22  0731 03/09/22  1107   POCGLU 171* 98 120 240*     Blood Sugar Average: Last 72 hrs:  (P) 209 3008   · Patient presented with nausea, vomiting, epigastric pain and was found to have severe hyperglycemia  Mildly elevated anion gap and mildly elevated beta hydroxybutyrate  · Stopped oral hypoglycemic agents including Amaryl, metformin and Januvia  Reportedly issues with noncompliance  · Resolved  Endocrinology consulted, appreciate recommendations  · Patient transitioned off the insulin gtt and converted to basal/bolus regimen  Endocrinology is following    Case discussed with them  · Ongoing adjustments have been made and I have confirmed with case management what tier 1 options are for meds to ensure affordability  · Patient is daughter-in-law is a nurse and they feel comfortable doing insulin for her No

## 2022-03-09 NOTE — QUICK NOTE
Chart, blood sugars reviewed    Recommend following changes  -continue Lantus 24 units subcutaneously at bedtime  -increase Humalog to 10 units with meals 3 times a day  -continue sliding scale insulin, will discontinue sliding scale at bedtime

## 2022-03-09 NOTE — ASSESSMENT & PLAN NOTE
· BNP elevated on admission    No previous levels on file to compare,    · echocardiogram with grade 1 diastolic dysfunction and normal ejection fraction

## 2022-03-09 NOTE — ASSESSMENT & PLAN NOTE
· Profoundly elevated blood pressure on admission at 270/133, with associated nonspecific EKG changes  · BP was elevated transiently this morning but significantly improved on recheck and I reviewed the case with Nephrology who stated no additional medications would be adjusted at this time  · Continue home dose of Coreg 25 mg p o  BID; nifedipine 90 mg daily, chlorthalidone 12 5 mg daily  Lisinopril 10 mg daily also added  · Secondary HTN work up in process  · Appreciate endocrine and renal input  · 24 hour urine for catecholemines obtained with results pending  However, there is a risk of being inaccurate in the hospital setting and this may require the test to be repeated at home    Serum catecholemines in process

## 2022-03-09 NOTE — PLAN OF CARE
Problem: MOBILITY - ADULT  Goal: Maintain or return to baseline ADL function  Description: INTERVENTIONS:  -  Assess patient's ability to carry out ADLs; assess patient's baseline for ADL function and identify physical deficits which impact ability to perform ADLs (bathing, care of mouth/teeth, toileting, grooming, dressing, etc )  - Assess/evaluate cause of self-care deficits   - Assess range of motion  - Assess patient's mobility; develop plan if impaired  - Assess patient's need for assistive devices and provide as appropriate  - Encourage maximum independence but intervene and supervise when necessary  - Involve family in performance of ADLs  - Assess for home care needs following discharge   - Consider OT consult to assist with ADL evaluation and planning for discharge  - Provide patient education as appropriate  Outcome: Progressing     Problem: Nutrition/Hydration-ADULT  Goal: Nutrient/Hydration intake appropriate for improving, restoring or maintaining nutritional needs  Description: Monitor and assess patient's nutrition/hydration status for malnutrition  Collaborate with interdisciplinary team and initiate plan and interventions as ordered  Monitor patient's weight and dietary intake as ordered or per policy  Utilize nutrition screening tool and intervene as necessary  Determine patient's food preferences and provide high-protein, high-caloric foods as appropriate       INTERVENTIONS:  - Monitor oral intake, urinary output, labs, and treatment plans  - Assess nutrition and hydration status and recommend course of action  - Evaluate amount of meals eaten  - Assist patient with eating if necessary   - Allow adequate time for meals  - Recommend/ encourage appropriate diets, oral nutritional supplements, and vitamin/mineral supplements  - Order, calculate, and assess calorie counts as needed  - Recommend, monitor, and adjust tube feedings and TPN/PPN based on assessed needs  - Assess need for intravenous fluids  - Provide specific nutrition/hydration education as appropriate  - Include patient/family/caregiver in decisions related to nutrition  Outcome: Progressing     Problem: Prexisting or High Potential for Compromised Skin Integrity  Goal: Skin integrity is maintained or improved  Description: INTERVENTIONS:  - Identify patients at risk for skin breakdown  - Assess and monitor skin integrity  - Assess and monitor nutrition and hydration status  - Monitor labs   - Assess for incontinence   - Turn and reposition patient  - Assist with mobility/ambulation  - Relieve pressure over bony prominences  - Avoid friction and shearing  - Provide appropriate hygiene as needed including keeping skin clean and dry  - Evaluate need for skin moisturizer/barrier cream  - Collaborate with interdisciplinary team   - Patient/family teaching  - Consider wound care consult   Outcome: Progressing     Problem: PAIN - ADULT  Goal: Verbalizes/displays adequate comfort level or baseline comfort level  Description: Interventions:  - Encourage patient to monitor pain and request assistance  - Assess pain using appropriate pain scale  - Administer analgesics based on type and severity of pain and evaluate response  - Implement non-pharmacological measures as appropriate and evaluate response  - Consider cultural and social influences on pain and pain management  - Notify physician/advanced practitioner if interventions unsuccessful or patient reports new pain  Outcome: Progressing     Problem: Knowledge Deficit  Goal: Patient/family/caregiver demonstrates understanding of disease process, treatment plan, medications, and discharge instructions  Description: Complete learning assessment and assess knowledge base    Interventions:  - Provide teaching at level of understanding  - Provide teaching via preferred learning methods  Outcome: Progressing

## 2022-03-09 NOTE — DISCHARGE INSTR - AVS FIRST PAGE
Dear Sharda Mahoney,     It was our pleasure to care for you here at [facility], [employer]  It is our hope that we were always able to exceed the expected standards for your care during your stay  You were hospitalized due to uncontrolled hypertension, mild diabetic ketoacidosis, nausea and vomiting  You were cared for on the 2nd/west 4th floor by Margrette Crigler, PA-C under the service of Valeria Arrieta MD with the [facility] who covers for your primary care physician (PCP), Rhonda Galdamez MD, while you were hospitalized  If you have any questions or concerns related to this hospitalization, you may contact us at [phone]  For follow up as well as any medication refills, we recommend that you follow up with your primary care physician  A registered nurse will reach out to you by phone within a few days after your discharge to answer any additional questions that you may have after going home  However, at this time we provide for you here, the most important instructions / recommendations at discharge:     Notable Medication Adjustments -   diabetes regimen--long-acting insulin Basaglar 24 units at bedtime, short-acting insulin NovoLog 9 units with meals  Blood pressure regimen--chlorthalidone 12 5 mg daily, lisinopril 10 mg daily, Procardia 90 mg daily, Coreg 25 mg twice a day  Stomach regimen--Reglan 5 mg before each meal if nausea and bloating, Protonix 40 mg twice a day, Pepcid 20 mg once to twice a day, simethicone as needed for gas pain  Hold further laxatives now that you are having looser stools    You can take MiraLax once a day if you become constipated again  Testing Required after Discharge -   Gastric emptying study to check for gastroparesis  Follow-up BMP to follow-up on kidney levels  Follow-up on the EGD biopsy and urine/blood catecholamines results that are pending from your hospitalization  Important follow up information - Follow-up with endocrinology, Nephrology, family doctor, GI  Other Instructions -   Follow a diabetic diet  Do not stop your medications unless instructed to do so  Do not take any NSAID medications  Monitor blood pressure and blood sugars closely at home  Please review this entire after visit summary as additional general instructions including medication list, appointments, activity, diet, any pertinent wound care, and other additional recommendations from your care team that may be provided for you        Sincerely,     Drake Chery PA-C

## 2022-03-09 NOTE — PROGRESS NOTES
[facility]  Progress Note Larisa Robles 1945, 78 y o  female MRN: [id number]  Unit/Bed#: W -01 Encounter: [id number]  Primary Care Provider: Camryn Leon MD   Date and time admitted to hospital: 3/2/2022  5:21 AM    * Abdominal pain with nausea and vomiting  Assessment & Plan  · Patient brought in for abdominal pain with nausea and vomiting, Polish speaking only and provided minimal history but noted to be frequently belching and moaning on admission  Noted to have central abdominal TTP  CTA C/A/P on admission was unremarkable aside from mild fatty liver  Lab work with elevated total bilirubin but normal LFTs, as well as SIRS criteria  Right upper quadrant ultrasound- biliary ductal dilatation  Patient is status post cholecystectomy  · Patient was seen and evaluated by GI on 03/04, thought possibly viral gastroenteritis  Patient's symptoms were initially improving with supportive cares ie  IV fluids and antiemetics, however patient with worsening nausea and vomiting again over weekend and abdominal cramping/feeling bloated and gassy per the patient  · Suspect component of diabetic gastroparesis, will need outpatient gastric emptying study  · EGD performed only showed mild gastritis and biopsies were taken to rule out H pylori (pending)  · Continue around the clock low-dose Reglan before meals, pepcid, Protonix and Carafate  · Continue ulcer free diet/small meals        DKA (diabetic ketoacidosis) [facility])  Assessment & Plan  Lab Results   Component Value Date    HGBA1C 9 6 (H) 03/02/2022       Recent Labs     03/08/22  2054 03/09/22  0414 03/09/22  0731 03/09/22  1107   POCGLU 171* 98 120 240*     Blood Sugar Average: Last 72 hrs:  (P) 802 5979   · Patient presented with nausea, vomiting, epigastric pain and was found to have severe hyperglycemia   Mildly elevated anion gap and mildly elevated beta hydroxybutyrate  · Stopped oral hypoglycemic agents including Amaryl, metformin and Januvia  Reportedly issues with noncompliance  · Resolved  Endocrinology consulted, appreciate recommendations  · Patient transitioned off the insulin gtt and converted to basal/bolus regimen  Endocrinology is following  · Ongoing adjustments have been made and I have confirmed with case management what tier 1 options are for meds to ensure affordability    Hypertensive urgency  Assessment & Plan  · Profoundly elevated blood pressure on admission at 270/133, with associated nonspecific EKG changes  · BP was elevated transiently this morning but significantly improved on recheck and I reviewed the case with Nephrology who stated no additional medications would be adjusted at this time  · Continue home dose of Coreg 25 mg p o  BID; nifedipine 90 mg daily, chlorthalidone 12 5 mg daily  Lisinopril 10 mg daily also added  · Secondary HTN work up in process  · Appreciate endocrine and renal input  · 24 hour urine for catecholemines obtained with results pending  However, there is a risk of being inaccurate in the hospital setting and this may require the test to be repeated at home  Serum catecholemines in process    SIRS (systemic inflammatory response syndrome) (HCC)-resolved as of 3/6/2022  Assessment & Plan  · Patient with leukocytosis POA, 14-->17-->20; now improving with WBC count of 10 9  Stable despite dexamethasone  · Source was not definite, possible viral gastroenteritis (vs gastroparesis)  · Procalcitonin <0 25 x 3, reassuring- suggesting non infectious process  · tachycardia and lactic acidosis present on admission resolved  · Blood cultures negative at 72 hours  · Urine Cx negative  · CT C/A/P on admission negative  · Continue to monitor off antibiotics  Monitor clinically        Hypoxia  Assessment & Plan  · Mild hypoxia noted 3/6-3/7/22 with room air sat 87-88%  Presently much improved O2 sat on room air   Not offering any complaints of shortness of breath  · March 7th chest x-ray read as mild pulmonary edema, but given overall improvement would not push with diuretics at this time unless nephrology recommends  · Encourage more mobility and incentive spirometry  Acute kidney injury superimposed on chronic kidney disease [facility])  Assessment & Plan  · Nephrology following  Patient likely has element CKD stage 3  Increased creatinine to 1 6 on 3/4/22 likely from contrast nephropathy from CTA   · Currently creatinine is stable with s Cr 1 31  · Continue to monitor renal function outpatient  · Avoid nephrotoxic agents and hypotension  · Follow creatinine with addition of ACEI-thus far stable    Constipation  Assessment & Plan  · Continue bowel regimen  · Outpatient colonoscopy    Hypokalemia  Assessment & Plan  · Repleted    Delusional disorder [facility])  Assessment & Plan  · Per history provided by son  Patient has had previous inpatient psychiatric stay in [place]    [facility]oxysmal [facility]  Assessment & Plan  · On xarelto but initially indication was unclear  In conversation using [employer] , patient reported history of atrial fibrillation  · We held Xarelto temporarily in case need for any invasive interventions, but she was not noted to have any events of bleeding and per my conversation with GI we resumed Xarelto    Elevated brain natriuretic peptide (BNP) level  Assessment & Plan  · BNP elevated on admission  No previous levels on file to compare,    · echocardiogram with grade 1 diastolic dysfunction and normal ejection fraction    VTE Pharmacologic Prophylaxis:   Pharmacologic: Rivaroxaban (Xarelto)  Mechanical VTE Prophylaxis in Place: No    Patient Centered Rounds: I have performed bedside rounds with nursing staff today      Discussions with Specialists or Other Care Team Provider:  Case discussed with case management, endocrinology    Education and Discussions with Family / Patient:  Called patient's son on 2 separate occasions today and was unable to reach him  Unfortunately this will delay patient's discharge if we cannot coordinate and confirm discharge and determine what pharmacy they want these new medications called into and set up VNA for her being a new diabetic    Time Spent for Care: 35 min  More than 50% of total time spent on counseling and coordination of care as described above  Current Length of Stay: 7 day(s)    Current Patient Status: Inpatient   Certification Statement: The patient will continue to require additional inpatient hospital stay due to unable to reach son    Discharge Plan:  Patient is medically stable and appropriate for discharge today but I have not heard back from patient's son to coordinate discharge therefore discharge will be delayed    Code Status: Level 1 - Full Code      Subjective:   Patient is a poor historian at times due to language barrier, telling me that she wants to know the blood pressure bacteria name " Despite attempts at clarifying, patient continues to repeat this phrase  However medically she is doing much better with no reports of chest pain, shortness of breath, abdominal pain and she has had a bowel movement  She is ambulating without assistance or difficulty    Objective:     Vitals:   Temp (24hrs), Av 2 °F (36 8 °C), Min:97 9 °F (36 6 °C), Max:98 4 °F (36 9 °C)    Temp:  [97 9 °F (36 6 °C)-98 4 °F (36 9 °C)] 97 9 °F (36 6 °C)  HR:  [73-82] 82  Resp:  [16-18] 17  BP: (132-196)/() 171/84  SpO2:  [92 %-99 %] 92 %  There is no height or weight on file to calculate BMI  Input and Output Summary (last 24 hours): Intake/Output Summary (Last 24 hours) at 3/9/2022 1306  Last data filed at 3/9/2022 0945  Gross per 24 hour   Intake 240 ml   Output 300 ml   Net -60 ml       Physical Exam:     Physical Exam  Vitals reviewed  Constitutional:       General: She is not in acute distress  Appearance: She is obese  She is not ill-appearing, toxic-appearing or diaphoretic     Eyes: General: No scleral icterus  Right eye: No discharge  Left eye: No discharge  Conjunctiva/sclera: Conjunctivae normal    Cardiovascular:      Rate and Rhythm: Normal rate and regular rhythm  Heart sounds: No murmur heard  Pulmonary:      Effort: No respiratory distress  Breath sounds: No stridor  No wheezing or rhonchi  Abdominal:      General: There is no distension  Palpations: Abdomen is soft  Tenderness: There is no abdominal tenderness  There is no guarding  Musculoskeletal:      Right lower leg: No edema  Left lower leg: No edema  Skin:     General: Skin is warm and dry  Coloration: Skin is not jaundiced or pale  Findings: No bruising, erythema, lesion or rash  Neurological:      General: No focal deficit present  Mental Status: She is alert  Mental status is at baseline  Comments: Awake alert interactive ambulating independently       Additional Data:     Labs:    Results from last 7 days   Lab Units 03/08/22  0508   WBC Thousand/uL 10 90*   HEMOGLOBIN g/dL 12 5   HEMATOCRIT % 38 0   PLATELETS Thousands/uL 279   NEUTROS PCT % 58   LYMPHS PCT % 28   MONOS PCT % 10   EOS PCT % 2     Results from last 7 days   Lab Units 03/09/22  0625 03/06/22  0640 03/05/22  0624   SODIUM mmol/L 141   < > 136   POTASSIUM mmol/L 3 6   < > 3 7   CHLORIDE mmol/L 104   < > 102   CO2 mmol/L 30   < > 26   BUN mg/dL 21   < > 44*   CREATININE mg/dL 1 31*   < > 1 60*   ANION GAP mmol/L 7   < > 8   CALCIUM mg/dL 9 2   < > 9 5   ALBUMIN g/dL  --   --  3 3*   TOTAL BILIRUBIN mg/dL  --   --  1 99*   ALK PHOS U/L  --   --  65   ALT U/L  --   --  22   AST U/L  --   --  14   GLUCOSE RANDOM mg/dL 114   < > 139    < > = values in this interval not displayed           Results from last 7 days   Lab Units 03/09/22  1107 03/09/22  2949 03/09/22  0414 03/08/22  2054 03/08/22  1539 03/08/22  1048 03/08/22  3777 03/08/22  0203 03/07/22  2040 03/07/22  1548 03/07/22  1135 03/07/22  0710   POC GLUCOSE mg/dl 240* 120 98 171* 158* 241* 134 163* 252* 204* 298* 160*         Results from last 7 days   Lab Units 03/05/22  0624 03/04/22  0456 03/03/22  1156 03/02/22  1806 03/02/22  1545   LACTIC ACID mmol/L  --   --   --  2 0 3 1*   PROCALCITONIN ng/ml 0 23 0 22 0 13  --   --      * I Have Reviewed All Lab Data Listed Above  * Additional Pertinent Lab Tests Reviewed:  [facility] Admission Reviewed    Imaging:    Imaging Reports Reviewed Today Include:   Imaging Personally Reviewed by Myself Includes:      Recent Cultures (last 7 days):           Last 24 Hours Medication List:   Current Facility-Administered Medications   Medication Dose Route Frequency Provider Last Rate    bisacodyl  10 mg Rectal Daily PRN Galina Melendrez PA-C      carvedilol  25 mg Oral BID With Meals Hermelindo Andrade PA-C      chlorthalidone  12 5 mg Oral Daily Beba Mclean MD      famotidine  20 mg Oral BID Hermelindo Andrade PA-C      insulin glargine  24 Units Subcutaneous HS Reilly Lara MD      insulin lispro  1-5 Units Subcutaneous HS Nalini Murray MD      insulin lispro  1-5 Units Subcutaneous 0200 Reilly Lara MD      insulin lispro  1-6 Units Subcutaneous TID AC Nalini Murray MD      insulin lispro  9 Units Subcutaneous TID With Meals Keanu Valero MD      lisinopril  10 mg Oral HS [facility], AMIRAH      metoclopramide  10 mg Intravenous Q6H PRN Erlin Craig MD      metoclopramide  5 mg Oral TID AC Galina Melendrez PA-C      NIFEdipine  90 mg Oral Daily Beba Mclean MD      ondansetron  4 mg Oral Q6H PRN Erlin Craig MD      pantoprazole  40 mg Oral BID AC Erlin Craig MD      polyethylene glycol  17 g Oral Daily Erlin Craig MD      pravastatin  20 mg Oral Daily With [employer]      rivaroxaban  20 mg Oral Daily With Breakfast Galina Melendrez PA-C      senna-docusate sodium  2 tablet Oral BID Galina Melendrez PA-C      simethicone  80 mg Oral Q6H EMILIAN Marquis Caridad MD      sucralfate  1 g Oral Q6H [address] Julius Patel PA-C          Today, Patient Was Seen By: Julius Patel PA-C    ** Please Note: Dictation voice to text software may have been used in the creation of this document   **

## 2022-03-09 NOTE — ASSESSMENT & PLAN NOTE
· Patient brought in for abdominal pain with nausea and vomiting, Polish speaking only and provided minimal history but noted to be frequently belching and moaning on admission  Noted to have central abdominal TTP  CTA C/A/P on admission was unremarkable aside from mild fatty liver  Lab work with elevated total bilirubin but normal LFTs, as well as SIRS criteria  Right upper quadrant ultrasound- biliary ductal dilatation  Patient is status post cholecystectomy  · Patient was seen and evaluated by GI on 03/04, thought possibly viral gastroenteritis  Patient's symptoms were initially improving with supportive cares ie  IV fluids and antiemetics, however patient with worsening nausea and vomiting again over weekend and abdominal cramping/feeling bloated and gassy per the patient      · Suspect component of diabetic gastroparesis, will need outpatient gastric emptying study  · EGD performed only showed mild gastritis and biopsies were taken to rule out H pylori (pending)  · Continue around the clock low-dose Reglan before meals, pepcid, Protonix and Carafate  · Continue ulcer free diet/small meals

## 2022-03-09 NOTE — ASSESSMENT & PLAN NOTE
· Nephrology following  Patient likely has element CKD stage 3    Increased creatinine to 1 6 on 3/4/22 likely from contrast nephropathy from CTA   · Currently creatinine is stable with s Cr 1 31  · Continue to monitor renal function outpatient  · Avoid nephrotoxic agents and hypotension  · Follow creatinine with addition of ACEI-thus far stable

## 2022-03-09 NOTE — PROGRESS NOTES
[facility] PROGRESS NOTE   Kimberly Needle 78 y o  female MRN: [id number]  Unit/Bed#: W -01 Encounter: [id number]  Reason for Consult: HTN    ASSESSMENT and PLAN:  1  Hypertension:  · BP accelerated on admission  · Workup: Renal artery duplex - negative for renal artery stenosis  TSH 1 496  · Pending: Renin, aldosterone, metanephrines, catecholamines  · Current regimen:  Carvedilol 25 mg BID, chlorthalidone 12 5 mg OD, nifedipine 90 mg OD, lisinopril 10 mg OD  · BP range remains high but acceptable and Lisinopril only recently yesterday  · Recommend no changes today  2  Elevated creatinine:  · Suspect this is due to chronic kidney disease, stage III  · Baseline creatinine appears to be 1 3 to 1 6  · Renal function stable  3  Hypokalemia:  · K 3 6      4  Left adrenal nodule  · Per endocrine  DISPOSITION:  · No BP medication changes for today  · Keep same regimen and monitor  · May be discharged from renal standpoint  · Recommend renal follow up on discharge  SUBJECTIVE / 24H INTERVAL HISTORY:  Denied CP, SOB, lightheadedness, dizziness  No acute complaints  OBJECTIVE:  Current Weight: Weight - Scale: 81 6 kg (180 lb)  Vitals:    03/08/22 1918 03/08/22 2231 03/08/22 2355 03/09/22 0643   BP: 168/62 (!) 193/82 169/74 (!) 171/84   BP Location:  Right arm     Pulse:  77 77 82   Resp:  18  17   Temp:   98 4 °F (36 9 °C) 97 9 °F (36 6 °C)   TempSrc:       SpO2:  96% 96% 92%   Weight:           Intake/Output Summary (Last 24 hours) at 3/9/2022 1041  Last data filed at 3/9/2022 0600  Gross per 24 hour   Intake --   Output 300 ml   Net -300 ml     General: conscious, cooperative, no distress  Skin: dry  Eyes: pink conjunctivae  ENT: moist mucous membranes  Respiratory: equal chest expansion, clear breath sounds    Cardiovascular: distinct heart sounds, normal rate, regular rhythm, no rub  Abdomen: soft, non tender, non distended, normal bowel sounds  Extremities: trace to mild LE edema  Genitourinary: no lugo catheter  Neuro: awake, alert     Psych: appropriate affect    Medications:    Current Facility-Administered Medications:     bisacodyl (DULCOLAX) rectal suppository 10 mg, 10 mg, Rectal, Daily PRN, Galina Melendrez PA-C    carvedilol (COREG) tablet 25 mg, 25 mg, Oral, BID With Meals, Nallely Quinones PA-C, 25 mg at 03/09/22 0843    chlorthalidone tablet 12 5 mg, 12 5 mg, Oral, Daily, Beverly Breen MD, 12 5 mg at 03/09/22 0926    famotidine (PEPCID) tablet 20 mg, 20 mg, Oral, BID, Nallely Quinones PA-C, 20 mg at 03/09/22 0844    insulin glargine (LANTUS) subcutaneous injection 24 Units 0 24 mL, 24 Units, Subcutaneous, HS, Pancho Lowry MD, 24 Units at 03/08/22 2227    insulin lispro (HumaLOG) 100 units/mL subcutaneous injection 1-5 Units, 1-5 Units, Subcutaneous, HS, Sally Phillips MD, 1 Units at 03/08/22 2227    insulin lispro (HumaLOG) 100 units/mL subcutaneous injection 1-5 Units, 1-5 Units, Subcutaneous, 0200, Pancho Lowry MD, 1 Units at 03/08/22 0205    insulin lispro (HumaLOG) 100 units/mL subcutaneous injection 1-6 Units, 1-6 Units, Subcutaneous, TID AC, 1 Units at 03/08/22 1906 **AND** Fingerstick Glucose (POCT), , , TID AC, Sally Phillips MD    insulin lispro (HumaLOG) 100 units/mL subcutaneous injection 9 Units, 9 Units, Subcutaneous, TID With Meals, Hima Deras MD, 9 Units at 03/09/22 0929    lisinopril (ZESTRIL) tablet 10 mg, 10 mg, Oral, HS, [facility], AMIRAH, 10 mg at 03/08/22 2227    metoclopramide (REGLAN) injection 10 mg, 10 mg, Intravenous, Q6H PRN, Maria Elena Monaco MD    metoclopramide (REGLAN) tablet 5 mg, 5 mg, Oral, TID AC, Galina Melendrez PA-C, 5 mg at 03/09/22 0627    NIFEdipine (PROCARDIA XL) 24 hr tablet 90 mg, 90 mg, Oral, Daily, Beverly Breen MD, 90 mg at 03/09/22 0844    ondansetron (ZOFRAN-ODT) dispersible tablet 4 mg, 4 mg, Oral, Q6H PRN, Maria Elena Monaco MD, 4 mg at 03/06/22 1122    pantoprazole (PROTONIX) EC tablet 40 mg, 40 mg, Oral, BID AC, Alvino Monk MD, 40 mg at 03/09/22 4939    polyethylene glycol (MIRALAX) packet 17 g, 17 g, Oral, Daily, Alvino Monk MD, 17 g at 03/09/22 4664    pravastatin (PRAVACHOL) tablet 20 mg, 20 mg, Oral, Daily With Wyatt Melendrez PA-C, 20 mg at 03/08/22 1722    rivaroxaban (XARELTO) tablet 20 mg, 20 mg, Oral, Daily With Breakfast, Galina Melendrez PA-C, 20 mg at 03/09/22 0843    senna-docusate sodium (SENOKOT S) 8 6-50 mg per tablet 2 tablet, 2 tablet, Oral, BID, Galina Melendrez PA-C, 2 tablet at 03/09/22 0844    simethicone (MYLICON) chewable tablet 80 mg, 80 mg, Oral, Q6H PRN, Alvino Monk MD, 80 mg at 03/08/22 1057    sucralfate (CARAFATE) tablet 1 g, 1 g, Oral, Q6H ROM, Galina Melendrez PA-C, 1 g at 03/09/22 3137    Laboratory Results:  Results from last 7 days   Lab Units 03/09/22  0625 03/08/22  0508 03/07/22  0518 03/06/22  0640 03/05/22  0624 03/04/22  0456 03/03/22  0551   WBC Thousand/uL  --  10 90* 10 68* 11 97* 14 33* 20 32* 17 20*   HEMOGLOBIN g/dL  --  12 5 12 1 13 0 13 2 13 8 13 8   HEMATOCRIT %  --  38 0 36 4 38 9 38 3 39 5 39 7   PLATELETS Thousands/uL  --  279 299 281 265 281 288   POTASSIUM mmol/L 3 6 3 7 3 4* 3 8 3 7 3 3* 3 5   CHLORIDE mmol/L 104 104 103 96* 102 101 103   CO2 mmol/L 30 29 30 25 26 27 29   BUN mg/dL 21 27* 30* 42* 44* 35* 25   CREATININE mg/dL 1 31* 1 31* 1 41* 1 65* 1 60* 1 61* 1 43*   CALCIUM mg/dL 9 2 8 9 8 9 9 6 9 5 9 4 9 4   MAGNESIUM mg/dL  --   --   --   --  2 3  --   --

## 2022-03-09 NOTE — CASE MANAGEMENT
Case Management Progress Note    Patient name Erinn Jiménez  Location W /W [address] [id number] MRN [id number]  : 1945 Date 3/9/2022       LOS (days): 7  Geometric Mean LOS (GMLOS) (days): 3 70  Days to GMLOS:-3 4        OBJECTIVE:        Current admission status: Inpatient  Preferred Pharmacy:   PATIENT/FAMILY REPORTS NO PREFERRED PHARMACY  No address on file      [address] ([place]) [facility]  [address] [place] [address]  Phone: [phone] Fax: [phone]    [facility] [address] [address]  Phone: [phone] Fax: [phone]    Primary Care Provider: Rose Rothman MD    Primary Insurance: MEDICARE  Secondary Insurance: Deng Adrian    PROGRESS NOTE:    CM was asked by medical to find out which pharmacy the pt uses  To set up diabetic supplies and medication  CM asked the pt, but d/t pt's recently moving pt was not sure what pharmacy she uses  Pt said her son would be in a little later in the day and to ask him  CM asked pt if it was OK to reach out and call her son instead, pt was agreeable  CM called son and LVM for a return call  @[phone] CM was contacted by AMIRAH and notified pt's son did not want VNA services, he would  the prescriptions and supplies at his pharmacy  Son stated his wife is a nurse and she will take care of pt's diabetic needs and education

## 2022-03-10 ENCOUNTER — TELEPHONE (OUTPATIENT)
Dept: NEPHROLOGY | Facility: CLINIC | Age: 77
End: 2022-03-10

## 2022-03-10 LAB
ALDOST SERPL-MCNC: <1 NG/DL (ref 0–30)
RENIN PLAS-CCNC: 0.42 NG/ML/HR (ref 0.17–5.38)

## 2022-03-10 RX ORDER — INSULIN DETEMIR 100 [IU]/ML
24 INJECTION, SOLUTION SUBCUTANEOUS
Qty: 15 ML | Refills: 0 | Status: SHIPPED | OUTPATIENT
Start: 2022-03-10

## 2022-03-10 NOTE — QUICK NOTE
I received notification from night shift that patient's son called late stating that the long-acting insulin was too expensive and they did not pick it up  This is despite the fact that I had intentionally chosen in insulin that was considered tier 1 based on what was sent to me from case management    As soon as the pharmacy opens I contacted them directly and they stated that patient does not have coverage for basiglar but would have coverage for Levemir at $4   I provided a new prescription and left a message for the patient's son

## 2022-03-11 LAB
DOPAMINE 24H UR-MRATE: 195 PG/ML (ref 0–48)
EPINEPH PLAS-MCNC: 125 PG/ML (ref 0–62)
NOREPINEPH PLAS-MCNC: 2896 PG/ML (ref 0–874)

## 2022-03-14 ENCOUNTER — TELEPHONE (OUTPATIENT)
Dept: NEPHROLOGY | Facility: CLINIC | Age: 77
End: 2022-03-14

## 2022-03-14 LAB
CORTIS F 24H UR-MRATE: 23 UG/24 HR (ref 6–42)
CORTIS F UR-MCNC: 19 UG/L
DOPAMINE 24H UR-MRATE: 85 UG/24 HR (ref 0–510)
DOPAMINE UR-MCNC: 69 UG/L
EPINEPH 24H UR-MRATE: 5 UG/24 HR (ref 0–20)
EPINEPH UR-MCNC: 4 UG/L
NOREPINEPH 24H UR-MRATE: 60 UG/24 HR (ref 0–135)
NOREPINEPH UR-MCNC: 49 UG/L

## 2022-03-15 ENCOUNTER — TELEPHONE (OUTPATIENT)
Dept: GASTROENTEROLOGY | Facility: AMBULARY SURGERY CENTER | Age: 77
End: 2022-03-15

## 2022-03-15 LAB
METANEPH 24H UR-MRATE: 105 UG/24 HR (ref 36–209)
METANEPHS 24H UR-MCNC: 86 UG/L
NORMETANEPHRINE 24H UR-MCNC: 536 UG/L
NORMETANEPHRINE 24H UR-MRATE: 657 UG/24 HR (ref 131–612)

## 2022-03-15 NOTE — TELEPHONE ENCOUNTER
----- Message from Fiona Mcmahan MD sent at 3/15/2022  8:11 AM EDT -----  Please inform the patient there is no evidence of celiac disease, no evidence of H pylori or intestinal metaplasia  No need for repeat EGD

## 2022-03-16 ENCOUNTER — TELEPHONE (OUTPATIENT)
Dept: NEPHROLOGY | Facility: CLINIC | Age: 77
End: 2022-03-16

## 2022-03-17 ENCOUNTER — TELEPHONE (OUTPATIENT)
Dept: GASTROENTEROLOGY | Facility: AMBULARY SURGERY CENTER | Age: 77
End: 2022-03-17

## 2022-03-17 ENCOUNTER — TELEPHONE (OUTPATIENT)
Dept: NEPHROLOGY | Facility: CLINIC | Age: 77
End: 2022-03-17

## 2022-03-17 NOTE — TELEPHONE ENCOUNTER
----- Message from Sergey Fraser MD sent at 3/15/2022  8:11 AM EDT -----  Please inform the patient there is no evidence of celiac disease, no evidence of H pylori or intestinal metaplasia  No need for repeat EGD

## 2022-03-17 NOTE — TELEPHONE ENCOUNTER
Spoke with pt's son, Meena Anthony, aware of results and verbalized understanding  I will fax PCP's office with procedure note and results and I will mail out to pt as well  Advised to call office with any questions or concerns

## 2022-03-17 NOTE — TELEPHONE ENCOUNTER
I called the patient's son and we spoke over the phone  Recent laboratory data were reviewed  I explained that the office has been trying to reach him to schedule a follow up appointment for his mom  He said that he works at night and sleeps during the day which is why he cannot answer the phone  I told him that some of the adrenal hormones are abnormal and that the patient needs follow up with us in the office  I emphasized the importance of follow up labs and a follow up visit in order to ensure good BP control  He told me that the patient got labs and has a follow up visit with her PCP next Monday  I gave him our [facility] phone number in order for him to call and make the appointment himself

## 2022-04-01 ENCOUNTER — TELEPHONE (OUTPATIENT)
Dept: NEPHROLOGY | Facility: CLINIC | Age: 77
End: 2022-04-01

## 2022-04-01 NOTE — TELEPHONE ENCOUNTER
JAILYN with her son to see if she has a new address as we received mail back stating unable to forward

## 2022-07-01 NOTE — ASSESSMENT & PLAN NOTE
· Profoundly elevated blood pressure on admission at 270/133, with associated nonspecific EKG changes  · BP was elevated transiently this morning but significantly improved on recheck and I reviewed the case with Nephrology who stated no additional medications would be adjusted at this time  · Continue home dose of Coreg 25 mg p o  BID; nifedipine 90 mg daily, chlorthalidone 12 5 mg daily  Lisinopril 10 mg daily also added  · Secondary HTN work up in process  · Appreciate endocrine and renal input  · 24 hour urine for catecholemines obtained with results pending  However, there is a risk of being inaccurate in the hospital setting and this may require the test to be repeated at home    Serum catecholemines in process 1.96